# Patient Record
Sex: FEMALE | Race: ASIAN
[De-identification: names, ages, dates, MRNs, and addresses within clinical notes are randomized per-mention and may not be internally consistent; named-entity substitution may affect disease eponyms.]

---

## 2018-01-04 ENCOUNTER — HOSPITAL ENCOUNTER (OUTPATIENT)
Dept: HOSPITAL 53 - M LAB | Age: 48
End: 2018-01-04
Attending: NURSE PRACTITIONER
Payer: COMMERCIAL

## 2018-01-04 DIAGNOSIS — E78.4: ICD-10-CM

## 2018-01-04 DIAGNOSIS — I10: Primary | ICD-10-CM

## 2018-01-04 DIAGNOSIS — F33.1: Primary | ICD-10-CM

## 2018-01-04 LAB
25(OH)D3 SERPL-MCNC: 15.3 NG/ML (ref 30–100)
ADD MANUAL DIFFER: YES
ALBUMIN/GLOBULIN RATIO: 0.89 (ref 1–1.93)
ALBUMIN/GLOBULIN RATIO: 0.89 (ref 1–1.93)
ALBUMIN: 3.3 GM/DL (ref 3.2–5.2)
ALBUMIN: 3.3 GM/DL (ref 3.2–5.2)
ALKALINE PHOSPHATASE: 81 U/L (ref 45–117)
ALKALINE PHOSPHATASE: 84 U/L (ref 45–117)
ALT SERPL W P-5'-P-CCNC: 21 U/L (ref 12–78)
ALT SERPL W P-5'-P-CCNC: 22 U/L (ref 12–78)
ANION GAP: 9 MEQ/L (ref 8–16)
ANION GAP: 9 MEQ/L (ref 8–16)
AST SERPL-CCNC: 11 U/L (ref 7–37)
AST SERPL-CCNC: 11 U/L (ref 7–37)
ATYPICAL LYMPH: 8 % (ref 0–5)
BANDS: 1 % (ref ?–11)
BILIRUBIN,TOTAL: 0.6 MG/DL (ref 0.2–1)
BILIRUBIN,TOTAL: 0.6 MG/DL (ref 0.2–1)
BLOOD UREA NITROGEN: 10 MG/DL (ref 7–18)
BLOOD UREA NITROGEN: 10 MG/DL (ref 7–18)
CALCIUM LEVEL: 8.9 MG/DL (ref 8.5–10.1)
CALCIUM LEVEL: 8.9 MG/DL (ref 8.5–10.1)
CARBON DIOXIDE LEVEL: 26 MEQ/L (ref 21–32)
CARBON DIOXIDE LEVEL: 26 MEQ/L (ref 21–32)
CHLORIDE LEVEL: 102 MEQ/L (ref 98–107)
CHLORIDE LEVEL: 103 MEQ/L (ref 98–107)
CHOLEST SERPL-MCNC: 117 MG/DL (ref ?–200)
CHOLEST SERPL-MCNC: 121 MG/DL (ref ?–200)
CHOLESTEROL RISK RATIO: 3.08 (ref ?–5)
CHOLESTEROL RISK RATIO: 3.18 (ref ?–5)
CREATININE FOR GFR: 0.67 MG/DL (ref 0.55–1.02)
CREATININE FOR GFR: 0.8 MG/DL (ref 0.55–1.02)
DIFF SLIDE NUMBER: 147
EOSINOPHILS: 12 % (ref 0–5)
EST. AVERAGE GLUCOSE BLD GHB EST-MCNC: 289 MG/DL (ref 60–110)
GFR SERPL CREATININE-BSD FRML MDRD: > 60 ML/MIN/{1.73_M2} (ref 58–?)
GFR SERPL CREATININE-BSD FRML MDRD: > 60 ML/MIN/{1.73_M2} (ref 58–?)
GLUCOSE, FASTING: 286 MG/DL (ref 70–105)
GLUCOSE, FASTING: 288 MG/DL (ref 70–105)
HDLC SERPL-MCNC: 38 MG/DL (ref 40–?)
HDLC SERPL-MCNC: 38 MG/DL (ref 40–?)
HEMATOCRIT: 41.7 % (ref 36–47)
HEMOGLOBIN: 14.3 G/DL (ref 12–16)
LDL CHOLESTEROL: 40 MG/DL (ref ?–100)
LDL CHOLESTEROL: 44 MG/DL (ref ?–100)
LYMPHOCYTES: 29 % (ref 16–52)
MEAN CORPUSCULAR HEMOGLOBIN: 28.3 PG (ref 27–33)
MEAN CORPUSCULAR HGB CONC: 34.3 G/DL (ref 32–36.5)
MEAN CORPUSCULAR VOLUME: 82.6 FL (ref 80–96)
MONOCYTES: 4 % (ref 0–8)
NEUTROPHILS: 46 % (ref 35–75)
NONHDLC SERPL-MCNC: 79 MG/DL
NONHDLC SERPL-MCNC: 83 MG/DL
NRBC BLD AUTO-RTO: 0 % (ref 0–0)
PLATELET BLD QL SMEAR: NORMAL
PLATELET COUNT, AUTOMATED: 307 10^3/UL (ref 150–450)
POSITIVE DIFF: (no result)
POTASSIUM SERUM: 3.9 MEQ/L (ref 3.5–5.1)
POTASSIUM SERUM: 4 MEQ/L (ref 3.5–5.1)
RBC MORPHOLOGY: NORMAL
RED BLOOD COUNT: 5.05 10^6/UL (ref 4–5.4)
RED CELL DISTRIBUTION WIDTH: 12.4 % (ref 11.5–14.5)
SODIUM LEVEL: 137 MEQ/L (ref 136–145)
SODIUM LEVEL: 138 MEQ/L (ref 136–145)
THYROID STIMULATING HORMONE: 4.26 UIU/ML (ref 0.36–3.74)
TOTAL PROTEIN: 7 GM/DL (ref 6.4–8.2)
TOTAL PROTEIN: 7 GM/DL (ref 6.4–8.2)
TRIGLYCERIDES LEVEL: 195 MG/DL (ref ?–150)
TRIGLYCERIDES LEVEL: 195 MG/DL (ref ?–150)
VITAMIN B12 LEVEL: 383 PG/ML (ref 247–911)
WHITE BLOOD COUNT: 14.6 10^3/UL (ref 4–10)

## 2018-01-04 PROCEDURE — 84443 ASSAY THYROID STIM HORMONE: CPT

## 2018-01-04 PROCEDURE — 80053 COMPREHEN METABOLIC PANEL: CPT

## 2019-01-02 ENCOUNTER — HOSPITAL ENCOUNTER (OUTPATIENT)
Dept: HOSPITAL 53 - M LAB | Age: 49
End: 2019-01-02
Attending: NURSE PRACTITIONER
Payer: COMMERCIAL

## 2019-01-02 DIAGNOSIS — E10.9: ICD-10-CM

## 2019-01-02 DIAGNOSIS — G56.02: ICD-10-CM

## 2019-01-02 DIAGNOSIS — I10: ICD-10-CM

## 2019-01-02 DIAGNOSIS — Z01.818: Primary | ICD-10-CM

## 2019-01-02 DIAGNOSIS — E78.49: ICD-10-CM

## 2019-01-02 LAB
ALBUMIN SERPL BCG-MCNC: 2.9 GM/DL (ref 3.2–5.2)
ALT SERPL W P-5'-P-CCNC: 25 U/L (ref 12–78)
BASOPHILS # BLD AUTO: 0 10^3/UL (ref 0–0.2)
BASOPHILS NFR BLD AUTO: 0.4 % (ref 0–1)
BILIRUB SERPL-MCNC: 0.4 MG/DL (ref 0.2–1)
BUN SERPL-MCNC: 13 MG/DL (ref 7–18)
BUN SERPL-MCNC: 13 MG/DL (ref 7–18)
CALCIUM SERPL-MCNC: 8.5 MG/DL (ref 8.5–10.1)
CALCIUM SERPL-MCNC: 8.5 MG/DL (ref 8.5–10.1)
CHLORIDE SERPL-SCNC: 106 MEQ/L (ref 98–107)
CHLORIDE SERPL-SCNC: 107 MEQ/L (ref 98–107)
CHOLEST SERPL-MCNC: 135 MG/DL (ref ?–200)
CHOLEST/HDLC SERPL: 2.93 {RATIO} (ref ?–5)
CO2 SERPL-SCNC: 30 MEQ/L (ref 21–32)
CO2 SERPL-SCNC: 31 MEQ/L (ref 21–32)
CREAT SERPL-MCNC: 0.64 MG/DL (ref 0.55–1.3)
CREAT SERPL-MCNC: 0.69 MG/DL (ref 0.55–1.3)
EOSINOPHIL # BLD AUTO: 0.6 10^3/UL (ref 0–0.5)
EOSINOPHIL NFR BLD AUTO: 5.2 % (ref 0–3)
GFR SERPL CREATININE-BSD FRML MDRD: > 60 ML/MIN/{1.73_M2} (ref 58–?)
GFR SERPL CREATININE-BSD FRML MDRD: > 60 ML/MIN/{1.73_M2} (ref 58–?)
GLUCOSE SERPL-MCNC: 48 MG/DL (ref 70–100)
GLUCOSE SERPL-MCNC: 51 MG/DL (ref 70–100)
HCT VFR BLD AUTO: 37.9 % (ref 36–47)
HDLC SERPL-MCNC: 46 MG/DL (ref 40–?)
HGB BLD-MCNC: 12.1 G/DL (ref 12–15.5)
LDLC SERPL CALC-MCNC: 64 MG/DL (ref ?–100)
LYMPHOCYTES # BLD AUTO: 3.3 10^3/UL (ref 1.5–4.5)
LYMPHOCYTES NFR BLD AUTO: 30 % (ref 24–44)
MCH RBC QN AUTO: 27.6 PG (ref 27–33)
MCHC RBC AUTO-ENTMCNC: 31.9 G/DL (ref 32–36.5)
MCV RBC AUTO: 86.5 FL (ref 80–96)
MONOCYTES # BLD AUTO: 0.9 10^3/UL (ref 0–0.8)
MONOCYTES NFR BLD AUTO: 8.1 % (ref 0–5)
NEUTROPHILS # BLD AUTO: 6.2 10^3/UL (ref 1.8–7.7)
NEUTROPHILS NFR BLD AUTO: 55.8 % (ref 36–66)
NONHDLC SERPL-MCNC: 89 MG/DL
PLATELET # BLD AUTO: 333 10^3/UL (ref 150–450)
POTASSIUM SERPL-SCNC: 3.9 MEQ/L (ref 3.5–5.1)
POTASSIUM SERPL-SCNC: 4.1 MEQ/L (ref 3.5–5.1)
PROT SERPL-MCNC: 6.6 GM/DL (ref 6.4–8.2)
RBC # BLD AUTO: 4.38 10^6/UL (ref 4–5.4)
SODIUM SERPL-SCNC: 142 MEQ/L (ref 136–145)
SODIUM SERPL-SCNC: 142 MEQ/L (ref 136–145)
TRIGL SERPL-MCNC: 123 MG/DL (ref ?–150)
WBC # BLD AUTO: 11.1 10^3/UL (ref 4–10)

## 2019-06-14 ENCOUNTER — HOSPITAL ENCOUNTER (OUTPATIENT)
Dept: HOSPITAL 53 - M OPP | Age: 49
Discharge: HOME | End: 2019-06-14
Attending: INTERNAL MEDICINE
Payer: COMMERCIAL

## 2019-06-14 VITALS — SYSTOLIC BLOOD PRESSURE: 141 MMHG | DIASTOLIC BLOOD PRESSURE: 78 MMHG

## 2019-06-14 VITALS — BODY MASS INDEX: 43.02 KG/M2 | HEIGHT: 63 IN | WEIGHT: 242.8 LBS

## 2019-06-14 DIAGNOSIS — K57.30: ICD-10-CM

## 2019-06-14 DIAGNOSIS — K60.2: ICD-10-CM

## 2019-06-14 DIAGNOSIS — Q43.8: ICD-10-CM

## 2019-06-14 DIAGNOSIS — D12.5: Primary | ICD-10-CM

## 2019-06-14 DIAGNOSIS — Z80.0: ICD-10-CM

## 2019-06-14 DIAGNOSIS — K92.1: ICD-10-CM

## 2019-06-14 DIAGNOSIS — K64.8: ICD-10-CM

## 2019-06-14 NOTE — ROOR
________________________________________________________________________________

Patient Name: Kath Ken            Procedure Date: 6/14/2019 7:30 AM

MRN: [id number]                          Account Number: [id number]

YOB: 1970               Age: 49

Room: [facility]                            Gender: Female

Note Status: Finalized                 

________________________________________________________________________________

 

Procedure:           Colonoscopy

Indications:         Hematochezia, Family history of colon cancer in multiple 

                     second-degree relatives, anal fissure

Providers:           Reid RENDON MD

Referring MD:        Twan Bailon NP

Requesting Provider: 

Medicines:           Monitored Anesthesia Care

Complications:       No immediate complications.

________________________________________________________________________________

Procedure:           Pre-Anesthesia Assessment:

                     - The heart rate, respiratory rate, oxygen saturations, 

                     blood pressure, adequacy of pulmonary ventilation, and 

                     response to care were monitored throughout the procedure.

                     The Colonoscope was introduced through the anus and 

                     advanced to the cecum, identified by appendiceal orifice 

                     and ileocecal valve. The colonoscopy was performed 

                     without difficulty. The patient tolerated the procedure 

                     well. The quality of the bowel preparation was good.

                                                                                

Findings:

     An anal fissure was found on perianal exam.

     A 5 mm polyp was found in the sigmoid colon. The polyp was sessile. The 

     polyp was removed with a cold snare. Resection and retrieval were 

     complete. To prevent bleeding after the polypectomy, two hemostatic clips 

     were successfully placed. There was no bleeding at the end of the 

     procedure.

     Multiple small-mouthed diverticula were found in the sigmoid colon.

     The sigmoid colon was tortuous.

     The exam was otherwise without abnormality on direct and retroflexion 

     views.

     Internal hemorrhoids were found during retroflexion. The hemorrhoids were 

     moderate.

                                                                                

Impression:          - A small anal fissure found on perianal exam.

                     - One 5 mm polyp in the sigmoid colon, removed with a 

                     cold snare. Resected and retrieved. Clips were placed.

                     - Mild diverticulosis in the sigmoid colon.

                     - Internal hemorrhoids.

                     - Tortuous sigmoid colon.

                     - The examination was otherwise normal on direct and 

                     retroflexion views.

Recommendation:      - Repeat colonoscopy in 5 years for surveillance.

                                                                                

 

Reid Rendon MD

________________

Reid RENDON MD

6/14/2019 8:03:37 AM

Electronically signed by Reid RENDON MD

Number of Addenda: 0

 

Note Initiated On: 6/14/2019 7:30 AM

Estimated Blood Loss:

     Estimated blood loss: none.

## 2019-08-13 ENCOUNTER — HOSPITAL ENCOUNTER (OUTPATIENT)
Dept: HOSPITAL 53 - M LAB | Age: 49
End: 2019-08-13
Attending: NURSE PRACTITIONER
Payer: COMMERCIAL

## 2019-08-13 DIAGNOSIS — E11.9: ICD-10-CM

## 2019-08-13 DIAGNOSIS — I10: Primary | ICD-10-CM

## 2019-08-13 DIAGNOSIS — E78.49: ICD-10-CM

## 2019-08-13 LAB
ALBUMIN SERPL BCG-MCNC: 2.9 GM/DL (ref 3.2–5.2)
ALT SERPL W P-5'-P-CCNC: 25 U/L (ref 12–78)
BASOPHILS # BLD AUTO: 0.1 10^3/UL (ref 0–0.2)
BASOPHILS NFR BLD AUTO: 0.5 % (ref 0–1)
BILIRUB SERPL-MCNC: 0.3 MG/DL (ref 0.2–1)
BUN SERPL-MCNC: 19 MG/DL (ref 7–18)
CALCIUM SERPL-MCNC: 8.8 MG/DL (ref 8.5–10.1)
CHLORIDE SERPL-SCNC: 108 MEQ/L (ref 98–107)
CHOLEST SERPL-MCNC: 134 MG/DL (ref ?–200)
CHOLEST/HDLC SERPL: 3.27 {RATIO} (ref ?–5)
CO2 SERPL-SCNC: 28 MEQ/L (ref 21–32)
CREAT SERPL-MCNC: 0.79 MG/DL (ref 0.55–1.3)
EOSINOPHIL # BLD AUTO: 0.8 10^3/UL (ref 0–0.5)
EOSINOPHIL NFR BLD AUTO: 6.6 % (ref 0–3)
EST. AVERAGE GLUCOSE BLD GHB EST-MCNC: 157 MG/DL (ref 60–110)
GFR SERPL CREATININE-BSD FRML MDRD: > 60 ML/MIN/{1.73_M2} (ref 58–?)
GLUCOSE SERPL-MCNC: 115 MG/DL (ref 70–100)
HCT VFR BLD AUTO: 40.7 % (ref 36–47)
HDLC SERPL-MCNC: 41 MG/DL (ref 40–?)
HGB BLD-MCNC: 12.9 G/DL (ref 12–15.5)
LDLC SERPL CALC-MCNC: 67 MG/DL (ref ?–100)
LYMPHOCYTES # BLD AUTO: 3.9 10^3/UL (ref 1.5–4.5)
LYMPHOCYTES NFR BLD AUTO: 32.2 % (ref 24–44)
MCH RBC QN AUTO: 27.6 PG (ref 27–33)
MCHC RBC AUTO-ENTMCNC: 31.7 G/DL (ref 32–36.5)
MCV RBC AUTO: 87 FL (ref 80–96)
MONOCYTES # BLD AUTO: 0.9 10^3/UL (ref 0–0.8)
MONOCYTES NFR BLD AUTO: 7.5 % (ref 0–5)
NEUTROPHILS # BLD AUTO: 6.4 10^3/UL (ref 1.8–7.7)
NEUTROPHILS NFR BLD AUTO: 52.7 % (ref 36–66)
NONHDLC SERPL-MCNC: 93 MG/DL
PLATELET # BLD AUTO: 308 10^3/UL (ref 150–450)
POTASSIUM SERPL-SCNC: 4 MEQ/L (ref 3.5–5.1)
PROT SERPL-MCNC: 6.5 GM/DL (ref 6.4–8.2)
RBC # BLD AUTO: 4.68 10^6/UL (ref 4–5.4)
SODIUM SERPL-SCNC: 142 MEQ/L (ref 136–145)
TRIGL SERPL-MCNC: 132 MG/DL (ref ?–150)
WBC # BLD AUTO: 12.1 10^3/UL (ref 4–10)

## 2021-04-13 ENCOUNTER — HOSPITAL ENCOUNTER (OUTPATIENT)
Dept: HOSPITAL 53 - M WHC | Age: 51
End: 2021-04-13
Attending: FAMILY MEDICINE
Payer: MEDICARE

## 2021-04-13 DIAGNOSIS — Z80.0: ICD-10-CM

## 2021-04-13 DIAGNOSIS — Z12.31: Primary | ICD-10-CM

## 2021-04-13 NOTE — REPMRS
Patient History

The patient states she has not had a clinical breast exam in over

a year.

Family history of colorectal cancer at age 50 in mother, 

colorectal cancer in maternal grandmother.

 

3D TOMOSYNTHESIS WAS PERFORMED.

 

The [facility] lifetime risk for breast cancer is 8.3%.

 

Volpara breast density b.

 

Digital Woman Screen Mammo: April 13, 2021 - Exam #: 

[id number]

Bilateral CC and MLO view(s) were taken.

 

Technologist: Alicia Kaur, Technologist

Prior study comparison: May 2, 2012, bilateral digital mammo 

screening bilat, performed at [facility].

 

FINDINGS: There are scattered fibroglandular densities.  There 

has been no change in the appearance of the mammogram from the 

prior studies.  There is a mild amount of residual fibroglandular

tissue which is fairly symmetric. There is no interval 

development of dominant mass, architectural distortion, or 

clustered microcalcification suggestive of malignancy.

 

Assessment: BI-RADS/ACR category 1 mammogram. Negative Mammogram.

 

Recommendation

Routine screening mammogram in 1 year (for women over age 40).

This mammogram was interpreted with the aid of an FDA-approved 

computer-aided dectection system.

 

Electronically Signed By: Wilmer Almanzar MD 04/13/21 [id number]

## 2021-10-16 ENCOUNTER — HOSPITAL ENCOUNTER (EMERGENCY)
Dept: HOSPITAL 53 - M ED | Age: 51
Discharge: HOME | End: 2021-10-16
Payer: MEDICARE

## 2021-10-16 VITALS — DIASTOLIC BLOOD PRESSURE: 100 MMHG | SYSTOLIC BLOOD PRESSURE: 174 MMHG

## 2021-10-16 VITALS — WEIGHT: 240.5 LBS | BODY MASS INDEX: 42.61 KG/M2 | HEIGHT: 63 IN

## 2021-10-16 DIAGNOSIS — Z79.4: ICD-10-CM

## 2021-10-16 DIAGNOSIS — F32.9: ICD-10-CM

## 2021-10-16 DIAGNOSIS — Z91.040: ICD-10-CM

## 2021-10-16 DIAGNOSIS — E11.9: ICD-10-CM

## 2021-10-16 DIAGNOSIS — I10: ICD-10-CM
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
## 2021-10-16 DIAGNOSIS — Z88.8: ICD-10-CM

## 2021-10-16 DIAGNOSIS — Z79.899: ICD-10-CM
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
## 2021-10-16 DIAGNOSIS — S82.852A: Primary | ICD-10-CM
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
## 2021-10-16 LAB
BASOPHILS # BLD AUTO: 0.1 10^3/UL (ref 0–0.2)
BASOPHILS NFR BLD AUTO: 0.6 % (ref 0–1)
BUN SERPL-MCNC: 28 MG/DL (ref 7–18)
CALCIUM SERPL-MCNC: 8.9 MG/DL (ref 8.5–10.1)
CHLORIDE SERPL-SCNC: 102 MEQ/L (ref 98–107)
CO2 SERPL-SCNC: 30 MEQ/L (ref 21–32)
CREAT SERPL-MCNC: 1.49 MG/DL (ref 0.55–1.3)
EOSINOPHIL # BLD AUTO: 0.6 10^3/UL (ref 0–0.5)
EOSINOPHIL NFR BLD AUTO: 4.3 % (ref 0–3)
GFR SERPL CREATININE-BSD FRML MDRD: 39.4 ML/MIN/{1.73_M2} (ref 51–?)
GLUCOSE SERPL-MCNC: 279 MG/DL (ref 70–100)
HCT VFR BLD AUTO: 44 % (ref 36–47)
HGB BLD-MCNC: 14.2 G/DL (ref 12–15.5)
LYMPHOCYTES # BLD AUTO: 4.3 10^3/UL (ref 1.5–5)
LYMPHOCYTES NFR BLD AUTO: 32 % (ref 24–44)
MCH RBC QN AUTO: 25.9 PG (ref 27–33)
MCHC RBC AUTO-ENTMCNC: 32.3 G/DL (ref 32–36.5)
MCV RBC AUTO: 80.1 FL (ref 80–96)
MONOCYTES # BLD AUTO: 1.2 10^3/UL (ref 0–0.8)
MONOCYTES NFR BLD AUTO: 8.7 % (ref 2–8)
NEUTROPHILS # BLD AUTO: 7.2 10^3/UL (ref 1.5–8.5)
NEUTROPHILS NFR BLD AUTO: 53.7 % (ref 36–66)
PLATELET # BLD AUTO: 344 10^3/UL (ref 150–450)
POTASSIUM SERPL-SCNC: 4.5 MEQ/L (ref 3.5–5.1)
RBC # BLD AUTO: 5.49 10^6/UL (ref 4–5.4)
SODIUM SERPL-SCNC: 139 MEQ/L (ref 136–145)
WBC # BLD AUTO: 13.4 10^3/UL (ref 4–10)

## 2021-10-16 PROCEDURE — 86850 RBC ANTIBODY SCREEN: CPT

## 2021-10-16 PROCEDURE — 80048 BASIC METABOLIC PNL TOTAL CA: CPT

## 2021-10-16 PROCEDURE — 99285 EMERGENCY DEPT VISIT HI MDM: CPT

## 2021-10-16 PROCEDURE — 73564 X-RAY EXAM KNEE 4 OR MORE: CPT

## 2021-10-16 PROCEDURE — 99156 MOD SED OTH PHYS/QHP 5/>YRS: CPT

## 2021-10-16 PROCEDURE — 73610 X-RAY EXAM OF ANKLE: CPT

## 2021-10-16 PROCEDURE — 27818 TREATMENT OF ANKLE FRACTURE: CPT

## 2021-10-16 PROCEDURE — 86900 BLOOD TYPING SEROLOGIC ABO: CPT

## 2021-10-16 PROCEDURE — 96374 THER/PROPH/DIAG INJ IV PUSH: CPT

## 2021-10-16 PROCEDURE — 73590 X-RAY EXAM OF LOWER LEG: CPT

## 2021-10-16 PROCEDURE — 93041 RHYTHM ECG TRACING: CPT

## 2021-10-16 PROCEDURE — 96375 TX/PRO/DX INJ NEW DRUG ADDON: CPT

## 2021-10-16 PROCEDURE — 86901 BLOOD TYPING SEROLOGIC RH(D): CPT

## 2021-10-16 PROCEDURE — 99157 MOD SED OTHER PHYS/QHP EA: CPT

## 2021-10-16 PROCEDURE — 85025 COMPLETE CBC W/AUTO DIFF WBC: CPT

## 2021-10-16 NOTE — CCD
Summarization Of Episode

                             Created on: 10/16/2021



VELMA BECK

External Reference #: [id number]

: 1970

Sex: Undifferentiated



Demographics





                          Address                   [address] [place] [address]  [address]

 

                          Home Phone                [phone]

 

                          Preferred Language        English

 

                          Marital Status            Unknown

 

                          Worship Affiliation     Unknown

 

                          Race                      Unknown

 

                          Ethnic Group              Not  or 





Author





                          Author                    [facility]

 

                          Organization              [facility]

 

                          Address                   Unknown

 

                          Phone                     Unavailable







Support





                Name            Relationship    Address         Phone

 

                DISABLED        Next Of Kin     Unknown         Unavailable

 

                    GRUPO,  MASOUD        Next Of Kin         [address] [place]  [address] [phone]

 

                    Onur MAYOma MCKEE   Next Of Kin         [address] [place], [place]  [phone]

 

                CONTACTS,  NO   Next Of Kin     Unknown         Unavailable

 

                    CARFRLUCÍA            Next Of Kin         [address] [place], [place]  [id number] [phone]

 

                    LINDA TALAMANTES    Next Of Kin         [address], [place]  [id number] [phone]

 

                 [employer] Next Of Kin     Unknown         Unavailable

 

                KUN PEREIRA  Next Of Kin     Unknown         [phone]

 

                UE              Next Of Kin     Unknown         Unavailable

 

                    JAS ASKEW    Next Of Kin         [address] [place], [place]  [id number] [phone]







Care Team Providers





                    Care Team Member Name Role                Phone

 

                    Trickey, J Chelsea PA  Unavailable         Unavailable

 

                    Trickey, J Chelsea PA  Unavailable         Unavailable

 

                    Trickey, J Chelsea PA  Unavailable         Unavailable

 

                    Trickey, J Chelsea PA  Unavailable         Unavailable

 

                    Trickey, J Chelsea PA  Unavailable         Unavailable

 

                    Trickey, J Chelsea PA  Unavailable         Unavailable

 

                    Trickey, J Chelsea PA  Unavailable         Unavailable

 

                    Trickey, J Chelsea PA  Unavailable         Unavailable

 

                    Trickey, J Chelsea PA  Unavailable         Unavailable

 

                    Trickey, J Chelsea PA  Unavailable         Unavailable

 

                    Trickey, J Chelsea PA  Unavailable         Unavailable

 

                    Trickey, J Chelsea PA  Unavailable         Unavailable

 

                    Trickey, J Chelsea PA  Unavailable         Unavailable

 

                    Trickey, J Chelsea PA  Unavailable         Unavailable

 

                    Trickey, J Chelsea PA  Unavailable         Unavailable

 

                    Trickey, J Chelsea PA  Unavailable         Unavailable

 

                    Trickey, J Chelsea PA  Unavailable         Unavailable

 

                    Trickey, J Chelsea PA  Unavailable         Unavailable

 

                    Trickey, J Chelsea PA  Unavailable         Unavailable

 

                    Trickey, J Chelsea PA  Unavailable         Unavailable

 

                    Trickey, J Chelsea PA  Unavailable         Unavailable

 

                    Trickey, J Chelsea PA  Unavailable         Unavailable

 

                    Trickey, J Chelsea PA  Unavailable         Unavailable

 

                    Trickey, J Chelsea PA  Unavailable         Unavailable

 

                    Trickey, J Chelsea PA  Unavailable         Unavailable

 

                    Trickey, J Chelsea PA  Unavailable         Unavailable

 

                    Trickey, J Chelsea PA  Unavailable         Unavailable

 

                    Trickey, J Chelsea PA  Unavailable         Unavailable

 

                    Trickey, J Chelsea PA  Unavailable         Unavailable

 

                    Trickey, J Chelsea PA  Unavailable         Unavailable

 

                    Trickey, J Chelsea PA  Unavailable         Unavailable

 

                    Trickey, J Chelsea PA  Unavailable         Unavailable

 

                    Trickey, J Chelsea PA  Unavailable         Unavailable

 

                    Trickey, J Chelsea PA  Unavailable         Unavailable

 

                    Trickey, J Chelsea PA  Unavailable         Unavailable

 

                    Trickey, J Chelsea PA  Unavailable         Unavailable

 

                    Trickey, J Chelsea PA  Unavailable         Unavailable

 

                    Trickey, J Chelsea PA  Unavailable         Unavailable

 

                    Trickey, J Chelsea PA  Unavailable         Unavailable

 

                    Trickey, J Chelsea PA  Unavailable         Unavailable

 

                    Trickey, J Chelsea PA  Unavailable         Unavailable

 

                    Trickey, J Chelsea PA  Unavailable         Unavailable

 

                    Trickey, J Chelsea PA  Unavailable         Unavailable

 

                    Trickey, J Chelsea PA  Unavailable         Unavailable

 

                    Trickey, J Chelsea PA  Unavailable         Unavailable

 

                    Trickey, J Chelsea PA  Unavailable         Unavailable

 

                    Trickey, J Chelsea PA  Unavailable         Unavailable

 

                    Trickey, J Chelsea PA  Unavailable         Unavailable

 

                    Trickey, J Chelsea PA  Unavailable         Unavailable



                                  



Re-disclosure Warning

          The records that you are about to access may contain information from 
federally-assisted alcohol or drug abuse programs. If such information is 
present, then the following federally mandated warning applies: This information
has been disclosed to you from records protected by federal confidentiality 
rules (42 CFR part 2). The federal rules prohibit you from making any further 
disclosure of this information unless further disclosure is expressly permitted 
by the written consent of the person to whom it pertains or as otherwise 
permitted by 42 CFR part 2. A general authorization for the release of medical 
or other information is NOT sufficient for this purpose. The Federal rules 
restrict any use of the information to criminally investigate or prosecute any 
alcohol or drug abuse patient.The records that you are about to access may 
contain highly sensitive health information, the redisclosure of which is 
protected by Article 27-F of the [facility] Public Health law. If you 
continue you may have access to information: Regarding HIV / AIDS; Provided by 
facilities licensed or operated by the [facility] Office of Mental Health; 
or Provided by the [facility] Office for People With Developmental 
Disabilities. If such information is present, then the following [place] 
mandated warning applies: This information has been disclosed to you from 
confidential records which are protected by state law. State law prohibits you 
from making any further disclosure of this information without the specific 
written consent of the person to whom it pertains, or as otherwise permitted by 
law. Any unauthorized further disclosure in violation of state law may result in
a fine or senior living sentence or both. A general authorization for the release of 
medical or other information is NOT sufficient authorization for further disc
losure.                                                                         
    



Family History

          



             Family Member Name Family Member Gender Family Member Status Date o

f Status 

Description                             Data Source(s)

 

           Unknown    Unknown    Problem                          [facility] ([facility] )



                                                                                
       



Encounters

          



           Encounter  Providers  Location   Date       Indications Data Source(s

)

 

                Outpatient      Attender: Chelsea LAW Main office - [facility]

n 2021 11:15:00

AM EDT                                              [facility] ([facility])

 

                Telehealth Physchotherapy 30 Minutes with Patient               

  [facility] 

2021 12:00:00 AM EDT                           [facility] ([facility]

[facility])

 

                Telehealth Physchotherapy 30 Minutes with Patient               

  [facility] 

2021 12:00:00 AM EDT                           [facility] ([facility]

[facility])

 

           Outpatient            [facility] 2021 12:00:00 AM

 EDT            [facility] 

([facility])

 

                Telehealth Physchotherapy 30 Minutes with Patient               

  [facility] 

2021 12:00:00 AM EDT                           [facility] ([facility]

[facility])

 

                Telehealth Physchotherapy 30 Minutes with Patient               

  [facility] 

2021 12:00:00 AM EDT                           [facility] ([facility]

[facility])

 

                Telehealth Physchotherapy 30 Minutes with Patient               

  [facility] 

2021 12:00:00 AM EDT                           [facility] ([facility]

[facility])

 

                Telehealth Physchotherapy 30 Minutes with Patient               

  [facility] 

2021 12:00:00 AM EDT                           [facility] ([facility]

[facility])

 

                Outpatient      Attender: Chelsea LAW [facility] office - [facility]

n 2021 01:45:00

PM EDT                                              [facility] ([facility] )

 

                Telehealth Physchotherapy 30 Minutes with Patient               

  [facility] 

2021 12:00:00 AM EDT                           [facility] ([facility])

 

                Telehealth Physchotherapy 30 Minutes with Patient               

  [facility] 

2021 12:00:00 AM EDT                           [facility] ([facility])

 

           Outpatient            [facility] 2021 12:00:00 AM

 EDT            [facility] 

([facility])

 

                Outpatient      Attender: Chelsea LAW Main office - [facility]

n 2021 11:15:00

AM EDT                                              [facility]

 

                Outpatient      Attender: Chelsea LAW Main office - [facility]

n 2020 08:45:00

AM EST                                              [facility]



                                                                                
                                                                                
                                                        



Immunizations

          



             Vaccine      Date         Status       Description  Data Source(s)

 

             COVID-19 VACCINE Moderna 2021 12:00:00 AM EDT completed      

           [facility]

 

                                        Vaccine Series Complete: YESThis Data wa

s Submitted to [facility] Via [facility]. 

 

             COVID-19 VACCINE Moderna 2021 12:00:00 AM EDT completed      

           [facility]

 

                                        Vaccine Series Complete: NOThis Data was

 Submitted to [facility] [employer]. 



                                                                                
                 



Medications

          



          Medication Brand Name Start Date Product Form Dose      Route     Admi

nistrative 

Instructions Pharmacy Instructions Status     Indications Reaction   Description

 Data 

Source(s)

 

                    24 HR venlafaxine 150 MG Extended Release Oral Capsule [Effe

xor] Effexor XR          

2021 12:00:00 AM EDT        150 mg oral                 active            

   Effexor XR [facility] 

([facility])

 

                    24 HR venlafaxine 75 MG Extended Release Oral Capsule [Effex

or] Effexor XR          

2021 12:00:00 AM EDT        75 mg  oral                 active            

   Effexor XR [facility] 

([facility])

 

                    24 HR venlafaxine 75 MG Extended Release Oral Capsule [Effex

or] Effexor XR          

2021 12:00:00 AM EDT        75 mg  oral                 active            

   Effexor XR [facility] 

([facility])

 

                    24 HR venlafaxine 150 MG Extended Release Oral Capsule [Effe

xor] Effexor XR          

2021 12:00:00 AM EDT        150 mg oral                 active            

   Effexor XR [facility] 

([facility])

 

                    24 HR venlafaxine 150 MG Extended Release Oral Capsule [Effe

xor] Effexor XR          

2021 12:00:00 AM EDT        150 mg oral                 active            

   Effexor XR [facility] 

([facility])

 

                    24 HR venlafaxine 75 MG Extended Release Oral Capsule [Effex

or] Effexor XR          

2021 12:00:00 AM EDT        75 mg  oral                 active            

   Effexor XR [facility] 

([facility])

 

                    24 HR venlafaxine 75 MG Extended Release Oral Capsule [Effex

or] Effexor XR          

2021 12:00:00 AM EDT        75 mg  oral                 active            

   Effexor XR [facility] 

([facility])

 

                    24 HR venlafaxine 75 MG Extended Release Oral Capsule [Effex

or] Effexor XR          

2021 12:00:00 AM EDT        75 mg  oral                 active            

   Effexor XR [facility] 

([facility])

 

                buspirone hydrochloride 10 MG Oral Tablet buspirone       2021 12:00:00 AM EDT  

        10 mg   oral                    active                  buspirone [facility]

[facility] ([facility] 

[facility])

 

                buspirone hydrochloride 10 MG Oral Tablet buspirone       2021 12:00:00 AM EDT  

        10 mg   oral                    active                  buspirone [facility]

[facility] ([facility] 

[facility])

 

                buspirone hydrochloride 10 MG Oral Tablet buspirone       2021 12:00:00 AM EDT  

        10 mg   oral                    active                  buspirone [facility]

[facility] ([facility] 

[facility])

 

                    24 HR venlafaxine 150 MG Extended Release Oral Capsule [Effe

xor] Effexor XR          

2021 12:00:00 AM EDT        150 mg oral                 active            

   Effexor XR [facility] 

([facility])

 

                buspirone hydrochloride 10 MG Oral Tablet buspirone       2021 12:00:00 AM EDT  

        10 mg   oral                    active                  buspirone [facility]

[facility] ([facility] 

[facility])

 

                    24 HR venlafaxine 150 MG Extended Release Oral Capsule [Effe

xor] Effexor XR          

2021 12:00:00 AM EDT        150 mg oral                 active            

   Effexor XR [facility] 

([facility])

 

                    24 HR venlafaxine 75 MG Extended Release Oral Capsule [Effex

or] Effexor XR          

2021 12:00:00 AM EDT        75 mg  oral                 active            

   Effexor XR [facility] 

([facility])

 

                buspirone hydrochloride 10 MG Oral Tablet buspirone       2021 12:00:00 AM EDT  

        10 mg   oral                    active                  buspirone [facility]

[facility] ([facility] 

[facility])

 

                    24 HR venlafaxine 75 MG Extended Release Oral Capsule [Effex

or] Effexor XR          

2021 12:00:00 AM EDT        75 mg  oral                 active            

   Effexor XR [facility] 

([facility])

 

                    24 HR venlafaxine 150 MG Extended Release Oral Capsule [Effe

xor] Effexor XR          

2021 12:00:00 AM EDT        150 mg oral                 active            

   Effexor XR [facility] 

([facility])

 

                buspirone hydrochloride 10 MG Oral Tablet buspirone       2021 12:00:00 AM EDT  

        10 mg   oral                    active                  buspirone [facility]

[facility] ([facility] 

[facility])

 

                buspirone hydrochloride 10 MG Oral Tablet buspirone       2021 12:00:00 AM EDT  

        10 mg   oral                    active                  buspirone [facility]

[facility] ([facility] 

[facility])

 

                    24 HR venlafaxine 150 MG Extended Release Oral Capsule [Effe

xor] Effexor XR          

2021 12:00:00 AM EDT        150 mg oral                 active            

   Effexor XR [facility] 

([facility])

 

                buspirone hydrochloride 10 MG Oral Tablet buspirone       2021 12:00:00 AM EDT  

        10 mg   oral                    active                  buspirone [facility]

[facility] ([facility] 

[facility])

 

                    24 HR venlafaxine 75 MG Extended Release Oral Capsule [Effex

or] Effexor XR          

2021 12:00:00 AM EDT        75 mg  oral                 active            

   Effexor XR [facility] 

([facility])

 

                    24 HR venlafaxine 150 MG Extended Release Oral Capsule [Effe

xor] Effexor XR          

2021 12:00:00 AM EDT        150 mg oral                 active            

   Effexor XR [facility] 

([facility])

 

                buspirone hydrochloride 10 MG Oral Tablet buspirone       2021 12:00:00 AM EDT  

        10 mg   oral                    active                  buspirone [facility]

[facility] ([facility])

 

                    24 HR venlafaxine 150 MG Extended Release Oral Capsule [Effe

xor] Effexor XR          

2021 12:00:00 AM EDT        150 mg oral                 active            

   Effexor XR [facility] 

([facility])

 

                    24 HR venlafaxine 75 MG Extended Release Oral Capsule [Effex

or] Effexor XR          

2021 12:00:00 AM EDT        75 mg  oral                 active            

   Effexor XR [facility] 

([facility])

 

                buspirone hydrochloride 10 MG Oral Tablet buspirone       2021 12:00:00 AM EDT  

        10 mg   oral                    active                  buspirone [facility]

[facility] ([facility] 

[facility])

 

                    24 HR venlafaxine 150 MG Extended Release Oral Capsule [Effe

xor] Effexor XR          

2021 12:00:00 AM EDT        150 mg oral                 active            

   Effexor XR [facility] 

([facility])

 

                    24 HR venlafaxine 75 MG Extended Release Oral Capsule [Effex

or] Effexor XR          

2021 12:00:00 AM EDT        75 mg  oral                 active            

   Effexor XR [facility] 

([facility])

 

                    24 HR venlafaxine 75 MG Extended Release Oral Capsule [Effex

or] Effexor XR          

2021 12:00:00 AM EDT        75 mg  oral                 completed         

      Effexor XR [facility] 

([facility])

 

                    24 HR venlafaxine 75 MG Extended Release Oral Capsule [Effex

or] Effexor XR          

2021 12:00:00 AM EDT        75 mg  oral                 completed         

      Effexor XR [facility] 

([facility])

 

                    24 HR venlafaxine 75 MG Extended Release Oral Capsule [Effex

or] Effexor XR          

2021 12:00:00 AM EDT        75 mg  oral                 completed         

      Effexor XR [facility] 

([facility])

 

                    24 HR venlafaxine 75 MG Extended Release Oral Capsule [Effex

or] Effexor XR          

2021 12:00:00 AM EDT        75 mg  oral                 completed         

      Effexor XR [facility] 

([facility])

 

                    24 HR venlafaxine 75 MG Extended Release Oral Capsule [Effex

or] Effexor XR          

2021 12:00:00 AM EDT        75 mg  oral                 completed         

      Effexor XR [facility] 

([facility])

 

                    24 HR venlafaxine 75 MG Extended Release Oral Capsule [Effex

or] Effexor XR          

2021 12:00:00 AM EDT        75 mg  oral                 completed         

      Effexor XR [facility] 

([facility])

 

                    24 HR venlafaxine 75 MG Extended Release Oral Capsule [Effex

or] Effexor XR          

2021 12:00:00 AM EDT        75 mg  oral                 completed         

      Effexor XR [facility] 

([facility])

 

                    24 HR venlafaxine 75 MG Extended Release Oral Capsule [Effex

or] Effexor XR          

2021 12:00:00 AM EDT        75 mg  oral                 completed         

      Effexor XR [facility] 

([facility])

 

                    24 HR venlafaxine 75 MG Extended Release Oral Capsule [Effex

or] Effexor XR          

2021 12:00:00 AM EDT        75 mg  oral                 completed         

      Effexor XR [facility] 

([facility])

 

                    24 HR venlafaxine 75 MG Extended Release Oral Capsule [Effex

or] Effexor XR          

2021 12:00:00 AM EDT        75 mg  oral                 completed         

      Effexor XR [facility] 

([facility])

 

             Mirtazapine 15 MG Oral Tablet [Remeron] Remeron      2021 12:

00:00 AM EDT              15 

mg      oral                    active                  Remeron [facility] ([facility])

 

             Mirtazapine 15 MG Oral Tablet [Remeron] Remeron      2021 12:

00:00 AM EDT              15 

mg      oral                    active                  Remeron [facility] ([facility])

 

             Mirtazapine 15 MG Oral Tablet [Remeron] Remeron      2021 12:

00:00 AM EDT              15 

mg      oral                    active                  Remeron [facility] ([facility])

 

             Mirtazapine 15 MG Oral Tablet [Remeron] Remeron      2021 12:

00:00 AM EDT              15 

mg      oral                    active                  [facility] [employer] ([facility] 

[facility])

 

             Mirtazapine 15 MG Oral Tablet [Remeron] Remeron      2021 12:

00:00 AM EDT              15 

mg      oral                    active                  [employer] ([facility] 

[facility])

 

             Mirtazapine 15 MG Oral Tablet [Remeron] Remeron      2021 12:

00:00 AM EDT              15 

mg      oral                    active                  [employer] ([facility] 

[facility])

 

             Mirtazapine 15 MG Oral Tablet [Remeron] Remeron      2021 12:

00:00 AM EDT              15 

mg      oral                    active                  [employer] ([facility] 

[facility])

 

             Mirtazapine 15 MG Oral Tablet [Remeron] Remeron      2021 12:

00:00 AM EDT              15 

mg      oral                    active                  [employer] ([facility] 

[facility])

 

             Mirtazapine 15 MG Oral Tablet [Remeron] Remeron      2021 12:

00:00 AM EDT              15 

mg      oral                    active                  [employer] ([facility] 

[facility])

 

             Mirtazapine 15 MG Oral Tablet [Remeron] Remeron      2021 12:

00:00 AM EDT              15 

mg      oral                    active                  [employer] [employer] ([facility] 

[facility])

 

                    24 HR venlafaxine 150 MG Extended Release Oral Capsule [Effe

xor] Effexor XR          

2021 12:00:00 AM EST        150 mg oral                 completed         

      [facility] X[facility] [employer] 

([facility])

 

                    24 HR venlafaxine 150 MG Extended Release Oral Capsule [Effe

xor] Effexor XR          

2021 12:00:00 AM EST        150 mg oral                 completed         

      [facility]xor XR [employer] 

([facility])

 

                    24 HR venlafaxine 150 MG Extended Release Oral Capsule [Effe

xor] Effexor XR          

2021 12:00:00 AM EST        150 mg oral                 completed         

      [facility]ffexor XR [employer] 

([facility])

 

                    24 HR venlafaxine 150 MG Extended Release Oral Capsule [Effe

xor] Effexor XR          

2021 12:00:00 AM EST        150 mg oral                 completed         

      [facility] XR [employer] 

([facility])

 

                    24 HR venlafaxine 150 MG Extended Release Oral Capsule [Effe

xor] Effexor XR          

2021 12:00:00 AM EST        150 mg oral                 completed         

      [facility]xor XR [employer] 

([facility])

 

                    24 HR venlafaxine 150 MG Extended Release Oral Capsule [Effe

xor] Effexor XR          

2021 12:00:00 AM EST        150 mg oral                 completed         

      [facility] XR [employer] 

([facility])

 

                    24 HR venlafaxine 150 MG Extended Release Oral Capsule [Effe

xor] Effexor XR          

2021 12:00:00 AM EST        150 mg oral                 completed         

      [facility] X[facility] [employer] 

([facility])

 

                    24 HR venlafaxine 150 MG Extended Release Oral Capsule [Effe

xor] Effexor XR          

2021 12:00:00 AM EST        150 mg oral                 completed         

      [facility]xor XR [employer] 

([facility])

 

                    24 HR venlafaxine 150 MG Extended Release Oral Capsule [Effe

xor] Effexor XR          

2021 12:00:00 AM EST        150 mg oral                 completed         

      [facility]ffexor XR [employer] 

([facility])

 

                    24 HR venlafaxine 150 MG Extended Release Oral Capsule [Effe

xor] Effexor XR          

2021 12:00:00 AM EST        150 mg oral                 completed         

      [facility]ffexor XR [employer] 

([facility])

 

                    24 HR venlafaxine 75 MG Extended Release Oral Capsule [Effex

or] Effexor XR          

02/15/2021 12:00:00 AM EST        75 mg  oral                 completed         

      [facility] X[facility] [employer] 

([facility])

 

                    24 HR venlafaxine 75 MG Extended Release Oral Capsule [Effex

or] Effexor XR          

02/15/2021 12:00:00 AM EST        75 mg  oral                 completed         

      [facility] X[facility] [employer] 

([facility])

 

                    24 HR venlafaxine 75 MG Extended Release Oral Capsule [Effex

or] Effexor XR          

02/15/2021 12:00:00 AM EST        75 mg  oral                 completed         

      [facility] X[facility] [employer] 

([facility])

 

                    24 HR venlafaxine 75 MG Extended Release Oral Capsule [Effex

or] Effexor XR          

02/15/2021 12:00:00 AM EST        75 mg  oral                 completed         

      [facility]xor XR [employer] 

([facility])

 

                    24 HR venlafaxine 75 MG Extended Release Oral Capsule [Effex

or] Effexor XR          

02/15/2021 12:00:00 AM EST        75 mg  oral                 completed         

      [facility] XR [employer] 

([facility])

 

                    24 HR venlafaxine 75 MG Extended Release Oral Capsule [Effex

or] Effexor XR          

02/15/2021 12:00:00 AM EST        75 mg  oral                 completed         

      [facility] XR [employer] 

([facility])

 

                    24 HR venlafaxine 75 MG Extended Release Oral Capsule [Effex

or] Effexor XR          

02/15/2021 12:00:00 AM EST        75 mg  oral                 completed         

      [facility] X[facility] [employer] 

([facility])

 

                    24 HR venlafaxine 75 MG Extended Release Oral Capsule [Effex

or] Effexor XR          

02/15/2021 12:00:00 AM EST        75 mg  oral                 completed         

      [facility]xor XR [employer] 

([facility])

 

                    24 HR venlafaxine 75 MG Extended Release Oral Capsule [Effex

or] Effexor XR          

02/15/2021 12:00:00 AM EST        75 mg  oral                 completed         

      [facility]ffexor XR [employer] 

([facility])

 

                    24 HR venlafaxine 75 MG Extended Release Oral Capsule [Effex

or] Effexor XR          

02/15/2021 12:00:00 AM EST        75 mg  oral                 completed         

      Effexor XR [employer] 

([facility])

 

             Mirtazapine 15 MG Oral Tablet [Remeron] Remeron      2021 12:

00:00 AM EST              15 

mg      oral                    completed                 [facility] [employer] ([facility] 

[facility])

 

             Mirtazapine 15 MG Oral Tablet [Remeron] Remeron      2021 12:

00:00 AM EST              15 

mg      oral                    completed                 [facility] [employer] ([facility] 

[facility])

 

             Mirtazapine 15 MG Oral Tablet [Remeron] Remeron      2021 12:

00:00 AM EST              15 

mg      oral                    completed                 [facility] [employer] ([facility] 

[facility])

 

             Mirtazapine 15 MG Oral Tablet [Remeron] Remeron      2021 12:

00:00 AM EST              15 

mg      oral                    completed                 [facility] [employer] ([facility] 

[facility])

 

             Mirtazapine 15 MG Oral Tablet [Remeron] Remeron      2021 12:

00:00 AM EST              15 

mg      oral                    completed                 [facility] [employer] ([facility] 

[facility])

 

             Mirtazapine 15 MG Oral Tablet [Remeron] Remeron      2021 12:

00:00 AM EST              15 

mg      oral                    completed                 [facility] [employer] ([facility] 

[facility])

 

             Mirtazapine 15 MG Oral Tablet [Remeron] Remeron      2021 12:

00:00 AM EST              15 

mg      oral                    completed                 [facility] [employer] ([facility] 

[facility])

 

             Mirtazapine 15 MG Oral Tablet [Remeron] Remeron      2021 12:

00:00 AM EST              15 

mg      oral                    completed                 [facility] [employer] ([facility] 

[facility])

 

             Mirtazapine 15 MG Oral Tablet [Remeron] Remeron      2021 12:

00:00 AM EST              15 

mg      oral                    completed                 [facility] [employer] ([facility] 

[facility])

 

             Mirtazapine 15 MG Oral Tablet [Remeron] Remeron      2021 12:

00:00 AM EST              15 

mg      oral                    completed                 [employer] [employer] ([facility] 

[facility])

 

                    Hydroxyzine Hydrochloride 25 MG Oral Tablet hydroxyzine HCl 

    2021 12:00:00 

AM EST        25 mg  oral                 active               hydroxyzine HCl T

[facility] 

[facility])

 

                    Hydroxyzine Hydrochloride 25 MG Oral Tablet hydroxyzine HCl 

    2021 12:00:00 

AM EST        25 mg  oral                 active               hydroxyzine HCl T

[facility] ([facility] 

[facility])

 

                    Hydroxyzine Hydrochloride 25 MG Oral Tablet hydroxyzine HCl 

    2021 12:00:00 

AM EST        25 mg  oral                 active               hydroxyzine HCl T

[facility] 

[facility])

 

                    Hydroxyzine Hydrochloride 25 MG Oral Tablet hydroxyzine HCl 

    2021 12:00:00 

AM EST        25 mg  oral                 active               hydroxyzine HCl T

[facility] 

[facility])

 

                    Hydroxyzine Hydrochloride 25 MG Oral Tablet hydroxyzine HCl 

    2021 12:00:00 

AM EST        25 mg  oral                 active               hydroxyzine HCl T

[facility] ([facility])

 

                    Hydroxyzine Hydrochloride 25 MG Oral Tablet hydroxyzine HCl 

    2021 12:00:00 

AM EST        25 mg  oral                 active               hydroxyzine HCl T

[facility])

 

                    Hydroxyzine Hydrochloride 25 MG Oral Tablet hydroxyzine HCl 

    2021 12:00:00 

AM EST        25 mg  oral                 active               hydroxyzine HCl T

[facility])

 

                    Hydroxyzine Hydrochloride 25 MG Oral Tablet hydroxyzine HCl 

    2021 12:00:00 

AM EST        25 mg  oral                 active               hydroxyzine HCl T

[facility] ([facility])

 

                    Hydroxyzine Hydrochloride 25 MG Oral Tablet hydroxyzine HCl 

    2021 12:00:00 

AM EST        25 mg  oral                 active               hydroxyzine HCl T

[facility] ([facility])

 

                    Hydroxyzine Hydrochloride 25 MG Oral Tablet hydroxyzine HCl 

    2021 12:00:00 

AM EST        25 mg  oral                 active               hydroxyzine HCl T

[facility] ([facility])

 

                    24 HR venlafaxine 75 MG Extended Release Oral Capsule [Effex

or] Effexor XR          

2021 12:00:00 AM EST        75 mg  oral                 completed         

      [facility] X[facility] [employer] 

([facility])

 

                    24 HR venlafaxine 75 MG Extended Release Oral Capsule [Effex

or] Effexor XR          

2021 12:00:00 AM EST        75 mg  oral                 completed         

      [facility] XR [employer] 

([facility])

 

                    24 HR venlafaxine 75 MG Extended Release Oral Capsule [Effex

or] Effexor XR          

2021 12:00:00 AM EST        75 mg  oral                 completed         

      [facility] XR [employer] 

([facility])

 

                    24 HR venlafaxine 75 MG Extended Release Oral Capsule [Effex

or] Effexor XR          

2021 12:00:00 AM EST        75 mg  oral                 completed         

      [facility]xor XR [employer] 

([facility])

 

                    24 HR venlafaxine 75 MG Extended Release Oral Capsule [Effex

or] Effexor XR          

2021 12:00:00 AM EST        75 mg  oral                 completed         

      [facility] X[facility] [employer] 

([facility])

 

                    24 HR venlafaxine 75 MG Extended Release Oral Capsule [Effex

or] Effexor XR          

2021 12:00:00 AM EST        75 mg  oral                 completed         

      [facility] XR [employer] 

([facility])

 

                    24 HR venlafaxine 75 MG Extended Release Oral Capsule [Effex

or] Effexor XR          

2021 12:00:00 AM EST        75 mg  oral                 completed         

      [facility] X[facility] [employer] 

([facility])

 

                    24 HR venlafaxine 75 MG Extended Release Oral Capsule [Effex

or] Effexor XR          

2021 12:00:00 AM EST        75 mg  oral                 completed         

      [facility] X[facility] [employer] 

([facility])

 

                    24 HR venlafaxine 75 MG Extended Release Oral Capsule [Effex

or] Effexor XR          

2021 12:00:00 AM EST        75 mg  oral                 completed         

      [facility] X[facility] [employer] 

([facility])

 

                    24 HR venlafaxine 75 MG Extended Release Oral Capsule [Effex

or] Effexor XR          

2021 12:00:00 AM EST        75 mg  oral                 completed         

      [facility] X[facility] [employer] 

([facility])

 

                    24 HR venlafaxine 37.5 MG Extended Release Oral Capsule [Eff

exor] Effexor XR          

2020 12:00:00 AM EST        37.5 mg oral                 completed        

       [facility] X[facility] [employer]

 ([facility])

 

                    24 HR venlafaxine 37.5 MG Extended Release Oral Capsule [Eff

exor] Effexor XR          

2020 12:00:00 AM EST        37.5 mg oral                 completed        

       [facility] X[facility] [employer]

 ([facility])

 

                    24 HR venlafaxine 150 MG Extended Release Oral Capsule [Effe

xor] Effexor XR          

2020 12:00:00 AM EST        150 mg oral                 completed         

      [facility]ffe[facility] XR [employer] 

([facility])

 

                          24 HR Bupropion Hydrochloride 300 MG Extended Release 

Oral Tablet [Wellbutrin] 

Wellbutrin XL 2020 12:00:00 AM EST        300 mg oral                 acti

ve               Wellbutrin

 XL                                     [employer] ([facility]

vin Services)

 

                    24 HR venlafaxine 150 MG Extended Release Oral Capsule [Effe

xor] Effexor XR          

2020 12:00:00 AM EST        150 mg oral                 completed         

      [facility]ffexor XR [employer] 

([facility])

 

                    24 HR venlafaxine 37.5 MG Extended Release Oral Capsule [Eff

exor] Effexor XR          

2020 12:00:00 AM EST        37.5 mg oral                 completed        

       [facility] XR [employer]

 ([facility])

 

                    24 HR venlafaxine 150 MG Extended Release Oral Capsule [Effe

xor] Effexor XR          

2020 12:00:00 AM EST        150 mg oral                 completed         

      [facility]ffexor XR [employer] 

([facility])

 

                    24 HR venlafaxine 150 MG Extended Release Oral Capsule [Effe

xor] Effexor XR          

2020 12:00:00 AM EST        150 mg oral                 completed         

      [facility]ffexor XR [employer] 

([facility])

 

                          24 HR Bupropion Hydrochloride 300 MG Extended Release 

Oral Tablet [Wellbutrin] 

Wellbutrin XL 2020 12:00:00 AM EST        300 mg oral                 acti

ve               Wellbutrin

 XL                                     [employer] ([facility]

[facility] Services)

 

                          24 HR Bupropion Hydrochloride 300 MG Extended Release 

Oral Tablet [Wellbutrin] 

Wellbutrin XL 2020 12:00:00 AM EST        300 mg oral                 acti

ve               Wellbutrin

 XL                                     [employer] ([facility]

[facility] Services)

 

                          24 HR Bupropion Hydrochloride 300 MG Extended Release 

Oral Tablet [Wellbutrin] 

Wellbutrin XL 2020 12:00:00 AM EST        300 mg oral                 acti

ve               Wellbutrin

 XL                                     [employer] ([facility]

ving Services)

 

                    24 HR venlafaxine 150 MG Extended Release Oral Capsule [Effe

xor] Effexor XR          

2020 12:00:00 AM EST        150 mg oral                 completed         

      [facility] XR [employer] 

([facility])

 

                    24 HR venlafaxine 37.5 MG Extended Release Oral Capsule [Eff

exor] Effexor XR          

2020 12:00:00 AM EST        37.5 mg oral                 completed        

       [facility] XR [employer]

 ([facility])

 

                    24 HR venlafaxine 150 MG Extended Release Oral Capsule [Effe

xor] Effexor XR          

2020 12:00:00 AM EST        150 mg oral                 completed         

      [facility]ffexor XR [employer] 

([facility])

 

                    24 HR venlafaxine 150 MG Extended Release Oral Capsule [Effe

xor] Effexor XR          

2020 12:00:00 AM EST        150 mg oral                 completed         

      Effexor XR [employer] 

([facility])

 

                    24 HR venlafaxine 37.5 MG Extended Release Oral Capsule [Eff

exor] Effexor XR          

2020 12:00:00 AM EST        37.5 mg oral                 completed        

       [facility] XR [employer]

 ([facility])

 

                          24 HR Bupropion Hydrochloride 300 MG Extended Release 

Oral Tablet [Wellbutrin] 

Wellbutrin XL 2020 12:00:00 AM EST        300 mg oral                 acti

ve               Wellbutrin

 XL                                     [employer] ([facility])

 

                    24 HR venlafaxine 37.5 MG Extended Release Oral Capsule [Eff

exor] Effexor XR          

2020 12:00:00 AM EST        37.5 mg oral                 completed        

       [facility] XR [employer]

 ([facility])

 

                    24 HR venlafaxine 150 MG Extended Release Oral Capsule [Effe

xor] Effexor XR          

2020 12:00:00 AM EST        150 mg oral                 completed         

      [facility]ffexor XR [employer] 

([facility])

 

                    24 HR venlafaxine 150 MG Extended Release Oral Capsule [Effe

xor] Effexor XR          

2020 12:00:00 AM EST        150 mg oral                 completed         

      [facility]ffexor XR [employer] 

([facility])

 

                          24 HR Bupropion Hydrochloride 300 MG Extended Release 

Oral Tablet [Wellbutrin] 

Wellbutrin XL 2020 12:00:00 AM EST        300 mg oral                 acti

ve               Wellbutrin

 XL                                     [employer] ([facility]

vin Services)

 

                    24 HR venlafaxine 37.5 MG Extended Release Oral Capsule [Eff

exor] Effexor XR          

2020 12:00:00 AM EST        37.5 mg oral                 completed        

       [facility] XR [employer]

 ([facility])

 

                          24 HR Bupropion Hydrochloride 300 MG Extended Release 

Oral Tablet [Wellbutrin] 

Wellbutrin XL 2020 12:00:00 AM EST        300 mg oral                 acti

ve               Wellbutrin

 XL                                     [employer] ([facility]

[facility] Services)

 

                          24 HR Bupropion Hydrochloride 300 MG Extended Release 

Oral Tablet [Wellbutrin] 

Wellbutrin XL 2020 12:00:00 AM EST        300 mg oral                 acti

ve               Wellbutrin

 XL                                     [employer] ([facility]

[facility] Services)

 

                    24 HR venlafaxine 37.5 MG Extended Release Oral Capsule [Eff

exor] Effexor XR          

2020 12:00:00 AM EST        37.5 mg oral                 completed        

       [facility] XR [employer]

 ([facility])

 

                          24 HR Bupropion Hydrochloride 300 MG Extended Release 

Oral Tablet [Wellbutrin] 

Wellbutrin XL 2020 12:00:00 AM EST        300 mg oral                 acti

ve               Wellbutrin

 XL                                     [employer] ([facility]

[facility])

 

                    24 HR venlafaxine 37.5 MG Extended Release Oral Capsule [Eff

exor] Effexor XR          

2020 12:00:00 AM EST        37.5 mg oral                 completed        

       [facility] XR [employer]

 ([facility])

 

                    24 HR venlafaxine 150 MG Extended Release Oral Capsule [Effe

xor] Effexor XR          

2020 12:00:00 AM EST        150 mg oral                 completed         

      Effexor XR [employer] 

([facility])

 

                          24 HR Bupropion Hydrochloride 300 MG Extended Release 

Oral Tablet [Wellbutrin] 

Wellbutrin XL 2020 12:00:00 AM EST        300 mg oral                 acti

ve               Wellbutrin

 XL                                     [employer] ([facility]

[facility])

 

                    24 HR venlafaxine 37.5 MG Extended Release Oral Capsule [Eff

exor] Effexor XR          

2020 12:00:00 AM EST        37.5 mg oral                 completed        

       [facility] XR [employer]

 ([facility])

 

                    24 HR venlafaxine 37.5 MG Extended Release Oral Capsule [Eff

exor] Effexor XR          

2020 12:00:00 AM EST        37.5 mg oral                 completed        

       [facility] XR [employer]

 ([facility] [facility]

 

                    24 HR venlafaxine 150 MG Extended Release Oral Capsule [Effe

xor] Effexor XR          

2020 12:00:00 AM EST        150 mg oral                 completed         

      Effexor XR [facility] 

([facility])

 

                    24 HR venlafaxine 37.5 MG Extended Release Oral Capsule [Eff

exor] Effexor XR          

2020 12:00:00 AM EST        37.5 mg oral                 completed        

       Effexor XR [facility]

 ([facility])

 

                    24 HR venlafaxine 150 MG Extended Release Oral Capsule [Effe

xor] Effexor XR          

2020 12:00:00 AM EST        150 mg oral                 completed         

      Effexor XR [facility] 

([facility])

 

                    24 HR venlafaxine 37.5 MG Extended Release Oral Capsule [Eff

exor] Effexor XR          

2020 12:00:00 AM EST        37.5 mg oral                 completed        

       Effexor XR [facility]

 ([facility])

 

                    24 HR venlafaxine 150 MG Extended Release Oral Capsule [Effe

xor] Effexor XR          

2020 12:00:00 AM EST        150 mg oral                 completed         

      Effexor XR [facility] 

([facility])

 

                    24 HR venlafaxine 37.5 MG Extended Release Oral Capsule [Eff

exor] Effexor XR          

2020 12:00:00 AM EST        37.5 mg oral                 completed        

       Effexor XR [facility]

 ([facility])

 

                    24 HR venlafaxine 150 MG Extended Release Oral Capsule [Effe

xor] Effexor XR          

2020 12:00:00 AM EST        150 mg oral                 completed         

      Effexor XR [facility] 

([facility])

 

             Mirtazapine 15 MG Oral Tablet [Remeron] Remeron      2020 12:

00:00 AM EST              15 

mg      oral                    completed                 Remeron [facility] ([facility])

 

                    24 HR venlafaxine 150 MG Extended Release Oral Capsule [Effe

xor] Effexor XR          

2020 12:00:00 AM EST        150 mg oral                 completed         

      Effexor XR [facility] 

([facility])

 

             Mirtazapine 15 MG Oral Tablet [Remeron] Remeron      2020 12:

00:00 AM EST              15 

mg      oral                    completed                 Remeron [facility] ([facility])

 

                    24 HR venlafaxine 150 MG Extended Release Oral Capsule [Effe

xor] Effexor XR          

2020 12:00:00 AM EST        150 mg oral                 completed         

      Effexor XR [facility] 

([facility])

 

             Mirtazapine 15 MG Oral Tablet [Remeron] Remeron      2020 12:

00:00 AM EST              15 

mg      oral                    completed                 Remeron [facility] ([facility])

 

                    24 HR venlafaxine 37.5 MG Extended Release Oral Capsule [Eff

exor] Effexor XR          

2020 12:00:00 AM EST        37.5 mg oral                 completed        

       Effexor XR [facility]

 ([facility])

 

             Mirtazapine 15 MG Oral Tablet [Remeron] Remeron      2020 12:

00:00 AM EST              15 

mg      oral                    completed                 Remeron [facility] ([facility])

 

                    24 HR venlafaxine 150 MG Extended Release Oral Capsule [Effe

xor] Effexor XR          

2020 12:00:00 AM EST        150 mg oral                 completed         

      Effexor XR [facility] 

([facility])

 

             Mirtazapine 15 MG Oral Tablet [Remeron] Remeron      2020 12:

00:00 AM EST              15 

mg      oral                    completed                 Remeron [facility] ([facility])

 

                    24 HR venlafaxine 37.5 MG Extended Release Oral Capsule [Eff

exor] Effexor XR          

2020 12:00:00 AM EST        37.5 mg oral                 completed        

       Effexor XR [facility]

 ([facility])

 

                    24 HR venlafaxine 37.5 MG Extended Release Oral Capsule [Eff

exor] Effexor XR          

2020 12:00:00 AM EST        37.5 mg oral                 completed        

       Effexor XR [facility]

 ([facility])

 

             Mirtazapine 15 MG Oral Tablet [Remeron] Remeron      2020 12:

00:00 AM EST              15 

mg      oral                    completed                 Remeron [facility] ([facility])

 

                    24 HR venlafaxine 150 MG Extended Release Oral Capsule [Effe

xor] Effexor XR          

2020 12:00:00 AM EST        150 mg oral                 completed         

      Effexor XR [facility] 

([facility])

 

                    24 HR venlafaxine 37.5 MG Extended Release Oral Capsule [Eff

exor] Effexor XR          

2020 12:00:00 AM EST        37.5 mg oral                 completed        

       Effexor XR [facility]

 ([facility])

 

             Mirtazapine 15 MG Oral Tablet [Remeron] Remeron      2020 12:

00:00 AM EST              15 

mg      oral                    completed                 Remeron [facility] ([facility])

 

                    24 HR venlafaxine 150 MG Extended Release Oral Capsule [Effe

xor] Effexor XR          

2020 12:00:00 AM EST        150 mg oral                 completed         

      Effexor XR [facility] 

([facility])

 

             Mirtazapine 15 MG Oral Tablet [Remeron] Remeron      2020 12:

00:00 AM EST              15 

mg      oral                    completed                 Remeron [facility] ([facility])

 

                    24 HR venlafaxine 150 MG Extended Release Oral Capsule [Effe

xor] Effexor XR          

2020 12:00:00 AM EST        150 mg oral                 completed         

      Effexor XR [facility] 

([facility])

 

             Mirtazapine 15 MG Oral Tablet [Remeron] Remeron      2020 12:

00:00 AM EST              15 

mg      oral                    completed                 Remeron [facility] ([facility])

 

                    24 HR venlafaxine 37.5 MG Extended Release Oral Capsule [Eff

exor] Effexor XR          

2020 12:00:00 AM EST        37.5 mg oral                 completed        

       Effexor XR [facility]

 ([facility])

 

             Mirtazapine 15 MG Oral Tablet [Remeron] Remeron      2020 12:

00:00 AM EST              15 

mg      oral                    completed                 Remeron [facility] ([facility])

 

                    24 HR venlafaxine 37.5 MG Extended Release Oral Capsule [Eff

exor] Effexor XR          

2020 12:00:00 AM EST        37.5 mg oral                 completed        

       Effexor XR [facility]

 ([facility])

 

                    Hydroxyzine Hydrochloride 25 MG Oral Tablet hydroxyzine HCl 

    2020 12:00:00 

AM EST        25 mg  oral                 completed               hydroxyzine HC

l [facility] ([facility])

 

                    Hydroxyzine Hydrochloride 25 MG Oral Tablet hydroxyzine HCl 

    2020 12:00:00 

AM EST        25 mg  oral                 completed               hydroxyzine HC

l [facility] ([facility])

 

                    Hydroxyzine Hydrochloride 25 MG Oral Tablet hydroxyzine HCl 

    2020 12:00:00 

AM EST        25 mg  oral                 completed               hydroxyzine HC

[facility]

 

                    Hydroxyzine Hydrochloride 25 MG Oral Tablet hydroxyzine HCl 

    2020 12:00:00 

AM EST        25 mg  oral                 completed               hydroxyzine HC

[facility]

 

                    Hydroxyzine Hydrochloride 25 MG Oral Tablet hydroxyzine HCl 

    2020 12:00:00 

AM EST        25 mg  oral                 completed               hydroxyzine HC

[facility]

 

                    Hydroxyzine Hydrochloride 25 MG Oral Tablet hydroxyzine HCl 

    2020 12:00:00 

AM EST        25 mg  oral                 completed               hydroxyzine HC

l [facility] ([facility])

 

                    Hydroxyzine Hydrochloride 25 MG Oral Tablet hydroxyzine HCl 

    2020 12:00:00 

AM EST        25 mg  oral                 completed               hydroxyzine HC

l [facility] ([facility])

 

                    Hydroxyzine Hydrochloride 25 MG Oral Tablet hydroxyzine HCl 

    2020 12:00:00 

AM EST        25 mg  oral                 completed               hydroxyzine HC

l [facility] ([facility])

 

                    Hydroxyzine Hydrochloride 25 MG Oral Tablet hydroxyzine HCl 

    2020 12:00:00 

AM EST        25 mg  oral                 completed               hydroxyzine HC

l [facility] ([facility])

 

                    Hydroxyzine Hydrochloride 25 MG Oral Tablet hydroxyzine HCl 

    2020 12:00:00 

AM EST        25 mg  oral                 completed               hydroxyzine HC

l [facility] ([facility])

 

                    24 HR venlafaxine 37.5 MG Extended Release Oral Capsule [Eff

exor] Effexor XR          

10/27/2020 12:00:00 AM EDT        37.5 mg oral                 completed        

       Effexor XR [facility]

 ([facility])

 

                    24 HR venlafaxine 150 MG Extended Release Oral Capsule [Effe

xor] Effexor XR          

10/27/2020 12:00:00 AM EDT        150 mg oral                 completed         

      Effexor XR [facility] 

([facility])

 

                    24 HR venlafaxine 150 MG Extended Release Oral Capsule [Effe

xor] Effexor XR          

10/27/2020 12:00:00 AM EDT        150 mg oral                 completed         

      Effexor XR [facility] 

([facility])

 

                    24 HR venlafaxine 37.5 MG Extended Release Oral Capsule [Eff

exor] Effexor XR          

10/27/2020 12:00:00 AM EDT        37.5 mg oral                 completed        

       Effexor XR [facility]

 ([facility])

 

                    24 HR venlafaxine 150 MG Extended Release Oral Capsule [Effe

xor] Effexor XR          

10/27/2020 12:00:00 AM EDT        150 mg oral                 completed         

      Effexor XR [facility] 

([facility])

 

                    24 HR venlafaxine 37.5 MG Extended Release Oral Capsule [Eff

exor] Effexor XR          

10/27/2020 12:00:00 AM EDT        37.5 mg oral                 completed        

       Effexor XR [facility]

 ([facility])

 

                    24 HR venlafaxine 37.5 MG Extended Release Oral Capsule [Eff

exor] Effexor XR          

10/27/2020 12:00:00 AM EDT        37.5 mg oral                 completed        

       Effexor XR [facility]

 ([facility])

 

                    24 HR venlafaxine 150 MG Extended Release Oral Capsule [Effe

xor] Effexor XR          

10/27/2020 12:00:00 AM EDT        150 mg oral                 completed         

      Effexor XR [facility] 

([facility])

 

                    24 HR venlafaxine 37.5 MG Extended Release Oral Capsule [Eff

exor] Effexor XR          

10/27/2020 12:00:00 AM EDT        37.5 mg oral                 completed        

       Effexor XR [facility]

 ([facility])

 

                    24 HR venlafaxine 150 MG Extended Release Oral Capsule [Effe

xor] Effexor XR          

10/27/2020 12:00:00 AM EDT        150 mg oral                 completed         

      Effexor XR [facility] 

([facility])

 

                    24 HR venlafaxine 37.5 MG Extended Release Oral Capsule [Eff

exor] Effexor XR          

10/27/2020 12:00:00 AM EDT        37.5 mg oral                 completed        

       Effexor XR [facility]

 ([facility])

 

                    24 HR venlafaxine 37.5 MG Extended Release Oral Capsule [Eff

exor] Effexor XR          

10/27/2020 12:00:00 AM EDT        37.5 mg oral                 completed        

       Effexor XR [facility]

 ([facility])

 

                    24 HR venlafaxine 150 MG Extended Release Oral Capsule [Effe

xor] Effexor XR          

10/27/2020 12:00:00 AM EDT        150 mg oral                 completed         

      Effexor XR [facility] 

([facility])

 

                    24 HR venlafaxine 150 MG Extended Release Oral Capsule [Effe

xor] Effexor XR          

10/27/2020 12:00:00 AM EDT        150 mg oral                 completed         

      Effexor XR [facility] 

([facility])

 

                    24 HR venlafaxine 37.5 MG Extended Release Oral Capsule [Eff

exor] Effexor XR          

10/27/2020 12:00:00 AM EDT        37.5 mg oral                 completed        

       Effexor XR [facility]

 ([facility])

 

                    24 HR venlafaxine 150 MG Extended Release Oral Capsule [Effe

xor] Effexor XR          

10/27/2020 12:00:00 AM EDT        150 mg oral                 completed         

      Effexor XR [facility] 

([facility])

 

                    24 HR venlafaxine 37.5 MG Extended Release Oral Capsule [Eff

exor] Effexor XR          

10/27/2020 12:00:00 AM EDT        37.5 mg oral                 completed        

       Effexor XR [facility]

 ([facility])

 

                    24 HR venlafaxine 37.5 MG Extended Release Oral Capsule [Eff

exor] Effexor XR          

10/27/2020 12:00:00 AM EDT        37.5 mg oral                 completed        

       Effexor XR [facility]

 ([facility])

 

                    24 HR venlafaxine 150 MG Extended Release Oral Capsule [Effe

xor] Effexor XR          

10/27/2020 12:00:00 AM EDT        150 mg oral                 completed         

      Effexor XR [facility] 

([facility])

 

                    24 HR venlafaxine 150 MG Extended Release Oral Capsule [Effe

xor] Effexor XR          

10/27/2020 12:00:00 AM EDT        150 mg oral                 completed         

      Effexor XR [facility] 

([facility])

 

                Trazodone Hydrochloride 50 MG Oral Tablet trazodone       2020 12:00:00 AM EDT  

        50 mg   oral                    completed                 trazodone [facility] rich[facility] ([facility])

 

                Trazodone Hydrochloride 50 MG Oral Tablet trazodone       2020 12:00:00 AM EDT  

        50 mg   oral                    completed                 trazodone [facility]

[facility] ([facility])

 

                Trazodone Hydrochloride 50 MG Oral Tablet trazodone       2020 12:00:00 AM EDT  

        50 mg   oral                    completed                 trazodone [facility]

[facility] ([facility])

 

                Trazodone Hydrochloride 50 MG Oral Tablet trazodone       2020 12:00:00 AM EDT  

        50 mg   oral                    completed                 trazodone [facility]

[facility] ([facility])

 

                Trazodone Hydrochloride 50 MG Oral Tablet trazodone       2020 12:00:00 AM EDT  

        50 mg   oral                    completed                 trazodone [facility]

[facility] ([facility])

 

                Trazodone Hydrochloride 50 MG Oral Tablet trazodone       2020 12:00:00 AM EDT  

        50 mg   oral                    completed                 trazodone [facility]

[facility] ([facility])

 

                Trazodone Hydrochloride 50 MG Oral Tablet trazodone       2020 12:00:00 AM EDT  

        50 mg   oral                    completed                 trazodone [facility]

[facility] ([facility])

 

                Trazodone Hydrochloride 50 MG Oral Tablet trazodone       2020 12:00:00 AM EDT  

        50 mg   oral                    completed                 trazodone [facility]

[facility] ([facility])

 

                Trazodone Hydrochloride 50 MG Oral Tablet trazodone       2020 12:00:00 AM EDT  

        50 mg   oral                    completed                 trazodone [facility]

[facility] ([facility])

 

                Trazodone Hydrochloride 50 MG Oral Tablet trazodone       2020 12:00:00 AM EDT  

        50 mg   oral                    completed                 trazodone [facility]

[facility] ([facility])

 

                    24 HR venlafaxine 150 MG Extended Release Oral Capsule venla

faxine         2020 

12:00:00 AM EDT        150 mg oral                 completed               venla

faxine [facility] ([facility])

 

                    24 HR venlafaxine 150 MG Extended Release Oral Capsule venla

faxine         2020 

12:00:00 AM EDT        150 mg oral                 completed               venla

faxine [facility] ([facility])

 

                    24 HR venlafaxine 37.5 MG Extended Release Oral Capsule venl

afaxine         2020 

12:00:00 AM EDT        37.5 mg oral                 completed               venl

afaxine [facility] ([facility])

 

                    24 HR venlafaxine 37.5 MG Extended Release Oral Capsule venl

afaxine         2020 

12:00:00 AM EDT        37.5 mg oral                 completed               venl

afaxine [facility] ([facility])

 

                    24 HR venlafaxine 150 MG Extended Release Oral Capsule venla

faxine         2020 

12:00:00 AM EDT        150 mg oral                 completed               venla

faxine [facility] ([facility])

 

                    24 HR venlafaxine 37.5 MG Extended Release Oral Capsule venl

afaxine         2020 

12:00:00 AM EDT        37.5 mg oral                 completed               venl

afaxine [facility] ([facility])

 

                    24 HR venlafaxine 37.5 MG Extended Release Oral Capsule venl

afaxine         2020 

12:00:00 AM EDT        37.5 mg oral                 completed               venl

afaxine [facility] ([facility])

 

                    24 HR venlafaxine 37.5 MG Extended Release Oral Capsule venl

afaxine         2020 

12:00:00 AM EDT        37.5 mg oral                 completed               venl

afaxine [facility] ([facility])

 

                    24 HR venlafaxine 150 MG Extended Release Oral Capsule venla

faxine         2020 

12:00:00 AM EDT        150 mg oral                 completed               venla

faxine [facility] ([facility])

 

                    24 HR venlafaxine 37.5 MG Extended Release Oral Capsule venl

afaxine         2020 

12:00:00 AM EDT        37.5 mg oral                 completed               venl

afaxine [facility] ([facility])

 

                    24 HR venlafaxine 150 MG Extended Release Oral Capsule venla

faxine         2020 

12:00:00 AM EDT        150 mg oral                 completed               venla

faxine [facility] ([facility])

 

                    24 HR venlafaxine 37.5 MG Extended Release Oral Capsule venl

afaxine         2020 

12:00:00 AM EDT        37.5 mg oral                 completed               venl

afaxine [facility] ([facility]

 

                    24 HR venlafaxine 150 MG Extended Release Oral Capsule venla

faxine         2020 

12:00:00 AM EDT        150 mg oral                 completed               venla

faxine [facility] ([facility])

 

                    24 HR venlafaxine 37.5 MG Extended Release Oral Capsule venl

afaxine         2020 

12:00:00 AM EDT        37.5 mg oral                 completed               venl

afaxine [facility] ([facility])

 

                    24 HR venlafaxine 37.5 MG Extended Release Oral Capsule venl

afaxine         2020 

12:00:00 AM EDT        37.5 mg oral                 completed               venl

afaxine [facility] ([facility])

 

                    24 HR venlafaxine 37.5 MG Extended Release Oral Capsule venl

afaxine         2020 

12:00:00 AM EDT        37.5 mg oral                 completed               venl

afaxine [facility] ([facility])

 

                    24 HR venlafaxine 150 MG Extended Release Oral Capsule venla

faxine         2020 

12:00:00 AM EDT        150 mg oral                 completed               venla

faxine [facility] ([facility])

 

                    24 HR venlafaxine 150 MG Extended Release Oral Capsule venla

faxine         2020 

12:00:00 AM EDT        150 mg oral                 completed               venla

faxine [facility] ([facility])

 

                    24 HR venlafaxine 150 MG Extended Release Oral Capsule venla

faxine         2020 

12:00:00 AM EDT        150 mg oral                 completed               venla

faxine [facility] ([facility])

 

                    24 HR venlafaxine 150 MG Extended Release Oral Capsule venla

faxine         2020 

12:00:00 AM EDT        150 mg oral                 completed               venla

faxine [facility] ([facility])

 

                    24 HR venlafaxine 150 MG Extended Release Oral Capsule venla

faxine         2020 

12:00:00 AM EDT        150 mg oral                 completed               venla

faxine [employer] ([facility])

 

                    24 HR venlafaxine 150 MG Extended Release Oral Capsule venla

faxine         2020 

12:00:00 AM EDT        150 mg oral                 completed               venla

[facility]ine [employer] ([facility])

 

                    24 HR venlafaxine 37.5 MG Extended Release Oral Capsule venl

afaxine         2020 

12:00:00 AM EDT        37.5 mg oral                 completed               venl

afaxine [employer] ([facility])

 

                    24 HR venlafaxine 37.5 MG Extended Release Oral Capsule venl

afaxine         2020 

12:00:00 AM EDT        37.5 mg oral                 completed               venl

afaxine [employer] ([facility])

 

                    24 HR venlafaxine 37.5 MG Extended Release Oral Capsule venl

afaxine         2020 

12:00:00 AM EDT        37.5 mg oral                 completed               venl

afaxine [employer] ([facility])

 

                    24 HR venlafaxine 150 MG Extended Release Oral Capsule venla

faxine         2020 

12:00:00 AM EDT        150 mg oral                 completed               venla

faxine [employer] ([facility])

 

                    24 HR venlafaxine 150 MG Extended Release Oral Capsule venla

faxine         2020 

12:00:00 AM EDT        150 mg oral                 completed               venla

faxine [employer] ([facility])

 

                    24 HR venlafaxine 150 MG Extended Release Oral Capsule venla

faxine         2020 

12:00:00 AM EDT        150 mg oral                 completed               venla

faxine [employer] ([facility])

 

                    24 HR venlafaxine 150 MG Extended Release Oral Capsule venla

faxine         2020 

12:00:00 AM EDT        150 mg oral                 completed               venla

faxine [employer] ([facility])

 

                    24 HR venlafaxine 37.5 MG Extended Release Oral Capsule venl

afaxine         2020 

12:00:00 AM EDT        37.5 mg oral                 completed               venl

afaxine [employer] ([facility])

 

                    24 HR venlafaxine 37.5 MG Extended Release Oral Capsule venl

afaxine         2020 

12:00:00 AM EDT        37.5 mg oral                 completed               venl

afaxine [employer] ([facility])

 

                    24 HR venlafaxine 37.5 MG Extended Release Oral Capsule venl

afaxine         2020 

12:00:00 AM EDT        37.5 mg oral                 completed               venl

afaxine [employer] ([facility])

 

                    24 HR venlafaxine 37.5 MG Extended Release Oral Capsule venl

afaxine         2020 

12:00:00 AM EDT        37.5 mg oral                 completed               venl

afaxine [employer] ([facility])

 

                    24 HR venlafaxine 37.5 MG Extended Release Oral Capsule venl

afaxine         2020 

12:00:00 AM EDT        37.5 mg oral                 completed               venl

afaxine [employer] ([facility])

 

                    24 HR venlafaxine 37.5 MG Extended Release Oral Capsule venl

afaxine         2020 

12:00:00 AM EDT        37.5 mg oral                 completed               venl

afaxine [employer] ([facility])

 

                    24 HR venlafaxine 37.5 MG Extended Release Oral Capsule venl

afaxine         2020 

12:00:00 AM EDT        37.5 mg oral                 completed               venl

afaxine [employer] ([facility])

 

                    24 HR venlafaxine 150 MG Extended Release Oral Capsule venla

faxine         2020 

12:00:00 AM EDT        150 mg oral                 completed               venla

faxine [employer] ([facility])

 

                    24 HR venlafaxine 150 MG Extended Release Oral Capsule venla

faxine         2020 

12:00:00 AM EDT        150 mg oral                 completed               venla

faxine [employer] ([facility])

 

                    24 HR venlafaxine 150 MG Extended Release Oral Capsule venla

faxine         2020 

12:00:00 AM EDT        150 mg oral                 completed               venla

faxine [employer] ([facility])

 

                    24 HR venlafaxine 150 MG Extended Release Oral Capsule venla

faxine         2020 

12:00:00 AM EDT        150 mg oral                 completed               venla

faxine [employer] ([facility])

 

                    Hydroxyzine Hydrochloride 25 MG Oral Tablet hydroxyzine HCl 

    2020 12:00:00 

AM EDT        25 mg  oral                 completed               hydroxyzine HC

l [employer] ([facility])

 

                    Hydroxyzine Hydrochloride 25 MG Oral Tablet hydroxyzine HCl 

    2020 12:00:00 

AM EDT        25 mg  oral                 completed               hydroxyzine HC

l [employer] ([facility])

 

                    Hydroxyzine Hydrochloride 25 MG Oral Tablet hydroxyzine HCl 

    2020 12:00:00 

AM EDT        25 mg  oral                 completed               hydroxyzine HC

l [employer] ([facility])

 

                    Hydroxyzine Hydrochloride 25 MG Oral Tablet hydroxyzine HCl 

    2020 12:00:00 

AM EDT        25 mg  oral                 completed               hydroxyzine HC

l [employer] ([facility])

 

                    Hydroxyzine Hydrochloride 25 MG Oral Tablet hydroxyzine HCl 

    2020 12:00:00 

AM EDT        25 mg  oral                 completed               hydroxyzine HC

l [employer] ([facility])

 

                    Hydroxyzine Hydrochloride 25 MG Oral Tablet hydroxyzine HCl 

    2020 12:00:00 

AM EDT        25 mg  oral                 completed               [facility] HC

[facility]

 

                    Hydroxyzine Hydrochloride 25 MG Oral Tablet hydroxyzine HCl 

    2020 12:00:00 

AM EDT        25 mg  oral                 completed               hydroxyzine HC

[facility]

 

                    Hydroxyzine Hydrochloride 25 MG Oral Tablet hydroxyzine HCl 

    2020 12:00:00 

AM EDT        25 mg  oral                 completed               hydroxyzine HC

[facility]

 

                    Hydroxyzine Hydrochloride 25 MG Oral Tablet hydroxyzine HCl 

    2020 12:00:00 

AM EDT        25 mg  oral                 completed               hydroxyzine HC

[facility]

 

                    Hydroxyzine Hydrochloride 25 MG Oral Tablet hydroxyzine HCl 

    2020 12:00:00 

AM EDT        25 mg  oral                 completed               hydroxyzine HC

[facility]

 

                    24 HR Bupropion Hydrochloride 300 MG Extended Release Oral T

ablet bupropion HCl       

2020 12:00:00 AM EDT        300 mg oral                 completed         

      bupropion HCl 

[facility] ([facility])

 

                Trazodone Hydrochloride 50 MG Oral Tablet trazodone       2020 12:00:00 AM EDT  

        50 mg   oral                    completed                 trazodone [facility] ([facility] 

[facility])

 

                    24 HR Bupropion Hydrochloride 300 MG Extended Release Oral T

ablet bupropion HCl       

2020 12:00:00 AM EDT        300 mg oral                 completed         

      bupropion HCl 

[facility] ([facility])

 

                Trazodone Hydrochloride 50 MG Oral Tablet trazodone       2020 12:00:00 AM EDT  

        50 mg   oral                    completed                 trazodone [facility] ([facility] 

[facility])

 

                Trazodone Hydrochloride 50 MG Oral Tablet trazodone       2020 12:00:00 AM EDT  

        50 mg   oral                    completed                 trazodone [facility]

[facility] ([facility] 

[facility])

 

                    24 HR Bupropion Hydrochloride 300 MG Extended Release Oral T

ablet bupropion HCl       

2020 12:00:00 AM EDT        300 mg oral                 completed         

      bupropion HCl 

[employer] ([facility])

 

                Trazodone Hydrochloride 50 MG Oral Tablet trazodone       2020 12:00:00 AM EDT  

        50 mg   oral                    completed                 trazodone [facility]

[facility] ([facility] 

[facility])

 

                    24 HR Bupropion Hydrochloride 300 MG Extended Release Oral T

ablet bupropion HCl       

2020 12:00:00 AM EDT        300 mg oral                 completed         

      bupropion HCl 

[employer] ([facility])

 

                    24 HR Bupropion Hydrochloride 300 MG Extended Release Oral T

ablet bupropion HCl       

2020 12:00:00 AM EDT        300 mg oral                 completed         

      bupropion HCl 

[employer] ([facility])

 

                Trazodone Hydrochloride 50 MG Oral Tablet trazodone       2020 12:00:00 AM EDT  

        50 mg   oral                    completed                 trazodone [facility] ([facility] 

[facility])

 

                Trazodone Hydrochloride 50 MG Oral Tablet trazodone       2020 12:00:00 AM EDT  

        50 mg   oral                    completed                 trazodone [facility] ([facility] 

[facility])

 

                    24 HR Bupropion Hydrochloride 300 MG Extended Release Oral T

ablet bupropion HCl       

2020 12:00:00 AM EDT        300 mg oral                 completed         

      bupropion HCl 

[facility] ([facility])

 

                Trazodone Hydrochloride 50 MG Oral Tablet trazodone       2020 12:00:00 AM EDT  

        50 mg   oral                    completed                 trazodone [facility]

[facility] ([facility] 

[facility])

 

                    24 HR Bupropion Hydrochloride 300 MG Extended Release Oral T

ablet bupropion HCl       

2020 12:00:00 AM EDT        300 mg oral                 completed         

      bupropion HCl 

[facility] ([facility])

 

                    24 HR Bupropion Hydrochloride 300 MG Extended Release Oral T

ablet bupropion HCl       

2020 12:00:00 AM EDT        300 mg oral                 completed         

      bupropion HCl 

[facility] ([facility])

 

                    24 HR Bupropion Hydrochloride 300 MG Extended Release Oral T

ablet bupropion HCl       

2020 12:00:00 AM EDT        300 mg oral                 completed         

      bupropion HCl 

[facility] ([facility])

 

                Trazodone Hydrochloride 50 MG Oral Tablet trazodone       2020 12:00:00 AM EDT  

        50 mg   oral                    completed                 trazodone [facility]

[facility] ([facility] 

[facility])

 

                Trazodone Hydrochloride 50 MG Oral Tablet trazodone       2020 12:00:00 AM EDT  

        50 mg   oral                    completed                 trazodone [facility]

[facility] ([facility] 

[facility])

 

                Trazodone Hydrochloride 50 MG Oral Tablet trazodone       2020 12:00:00 AM EDT  

        50 mg   oral                    completed                 trazodone [facility]

[facility] ([facility] 

[facility])

 

                    24 HR Bupropion Hydrochloride 300 MG Extended Release Oral T

ablet bupropion HCl       

2020 12:00:00 AM EDT        300 mg oral                 completed         

      bupropion HCl 

[employer] ([facility])

 

                buspirone hydrochloride 15 MG Oral Tablet buspirone       2019 12:00:00 AM EDT  

        15 mg   oral                    completed                 buspirone [facility]

[facility] ([facility] 

[facility])

 

                buspirone hydrochloride 15 MG Oral Tablet buspirone       2019 12:00:00 AM EDT  

        15 mg   oral                    completed                 buspirone [facility]

[facility] ([facility] 

[facility])

 

                buspirone hydrochloride 15 MG Oral Tablet buspirone       2019 12:00:00 AM EDT  

        15 mg   oral                    completed                 buspirone [facility]

[facility] ([facility] 

[facility])

 

                buspirone hydrochloride 15 MG Oral Tablet buspirone       2019 12:00:00 AM EDT  

        15 mg   oral                    completed                 buspirone [facility]

[facility] ([facility] 

[facility])

 

                buspirone hydrochloride 15 MG Oral Tablet buspirone       2019 12:00:00 AM EDT  

        15 mg   oral                    completed                 buspirone [facility]

[facility] ([facility])

 

                buspirone hydrochloride 15 MG Oral Tablet buspirone       2019 12:00:00 AM EDT  

        15 mg   oral                    completed                 buspirone [facility]

[facility] ([facility])

 

                buspirone hydrochloride 15 MG Oral Tablet buspirone       2019 12:00:00 AM EDT  

        15 mg   oral                    completed                 buspirone [facility]

[facility] ([facility] 

[facility])

 

                buspirone hydrochloride 15 MG Oral Tablet buspirone       2019 12:00:00 AM EDT  

        15 mg   oral                    completed                 buspirone [facility]

[facility] ([facility])

 

                buspirone hydrochloride 15 MG Oral Tablet buspirone       2019 12:00:00 AM EDT  

        15 mg   oral                    completed                 buspirone [facility]

[facility] ([facility] 

[facility])

 

                buspirone hydrochloride 15 MG Oral Tablet buspirone       2019 12:00:00 AM EDT  

        15 mg   oral                    completed                 buspirone [facility]

[facility] ([facility] 

[facility])



                                                                                
                                                                                
                                                                                
                                                                                
                                                                                
                                                                                
                                                                                
                                                                                
                                                                                
                                                                                
                                                                                
                                                                                
                                                                                
                                                                                
                                                                                
                                                                                
                                                                                
                                                                                
                                                                                
                                                                                
                                                                                
                                                                                
                                                                                
                                                                                
                                                                                
                                                                                
                                                                                
                                                                                
                                                                                
                                                                                
                                                                                
                                                                                
                                                                                
                                                                                
                                                                                
                                                          



Insurance Providers

          



             Payer name   Policy type / Coverage type Policy ID    Covered party

 ID Covered 

party's relationship to davis Policy [place]             Plan Information

 

          [facility] [employer]ECK PLAN           [id number]           SP            

      [id number]

 

          EXCELLUS  I         [id number]           Self                [id number]

[id number]

 

           [place] [employer] [id number]

[id number] Self       

                                        [id number]

 

          Managed Care [employer] P         [id number]           S                

   [id number]

 

           [employer] [id number]

867.0 Self       

                                        [id number]

 

           [employer] [id number]

867.0 Self       

                                        [id number]

 

                [employer]    Commercial      [id number]     

[id number] Self                              

      [id number]

 

           [place] [employer] [id number]

867.0 Self       

                                        [id number]

 

          [employer]   I         [id number]           Self                [id number]

 

          [facility]           [id number]           SP             

     [id number]

 

          [facility]         [id number]           S      

             [id number]

 

                [place] [employer] Commercial      [id number]     

[id number] Self                                    7

[id number]

 

                [employer] Commercial      [id number]     

[id number] Self                                    7

[id number]

 

                [place] [employer] Commercial      [id number]     

[id number] Self                                    7

[id number]

 

          [employer] [place]         [id number] S                   74

[id number]

 

          [facility] PLAN [facility]           [id number]           SP           

       [id number]

 

          MEDICAID            [id number]            SP                  [id number]

 

          [place]           [id number]            SP                  [id number]

[id number]

 

                              [id number]                               [id number]

[id number]

 

          [facility]           [id number]           SP             

     [id number]

 

          [employer]             [id number]           SP                  [id number]

[id number]

 

          EMED[place]              [id number]            SP                  [id number]

 

          [facility]           [id number]           SP             

     [id number]

 

          Medicaid  S         [id number]            S                   [id number]

 

          D [employer] Medicare Dental O         [id number]           S   

                [id number]

 

          [facility] P         [id number]           S      

             [id number]

 

          Medicare  O         [id number]           S                   [id number]

[id number]

 

                [employer] [facility] Commercial      [id number]       

[id number] Self                                    

[id number]

 

                [employer]/CHP/[employer] Commercial      [id number]     

[id number] Self                                    7

[id number]



                                                                                
                                                                                
                                                                                
                                                                                
                                                



Problems, Conditions, and Diagnoses

          



           Code       Display Name Description Problem Type Effective Dates Data

 Source(s)

 

             [id number]     Generalized anxiety disorder Generalized anxiety disor

dennise Condition    

2021 12:00:00 AM EST              [facility] ([facility]

[facility] 

[facility])

 

             [id number]     Generalized anxiety disorder Generalized anxiety disor

dennise Condition    

2021 12:00:00 AM EST              [facility] ([facility]

[facility] 

[facility])

 

             [id number]     Generalized anxiety disorder Generalized anxiety disor

dennise Condition    

2021 12:00:00 AM EST              [facility] ([facility]

[facility] 

Services)

 

             [id number]     Generalized anxiety disorder Generalized anxiety disor

dennise Condition    

2021 12:00:00 AM EST              [facility] ([facility]

[facility] 

Services)

 

             [id number]     Generalized anxiety disorder Generalized anxiety disor

dennise Condition    

2021 12:00:00 AM EST              [facility] ([facility]

[facility] 

Services)

 

             [id number]     Generalized anxiety disorder Generalized anxiety disor

dennise Condition    

2021 12:00:00 AM EST              [facility] ([facility]

ving 

Services)

 

             [id number]     Generalized anxiety disorder Generalized anxiety disor

dennise Condition    

2021 12:00:00 AM EST              [employer] ([facility]

ving 

Services)

 

             [id number]     Generalized anxiety disorder Generalized anxiety disor

dennise Condition    

2021 12:00:00 AM EST              [employer] ([facility]

ving 

Services)

 

             [id number]     Generalized anxiety disorder Generalized anxiety disor

dennise Condition    

2021 12:00:00 AM EST              [employer] ([facility]

ving 

Services)

 

             [id number]     Generalized anxiety disorder Generalized anxiety disor

dennise Condition    

2021 12:00:00 AM EST              [employer] ([facility]

ving 

Services)



                                                                                
                                                                                
                            



Surgeries/Procedures

          



             Procedure    Description  Date         Indications  Data Source(s)

 

             INJECTION SINGLE/MLT TRIGGER POINT 3/> MUSCLES              10/11/2

021 12:00:00 AM EDT              

[facility] ([facility], )

 

             Inj, Anesth Agent, Trigeminal              10/11/2021 12:00:00 AM E

DT              [facility] ([facility], )

 

             INJECTION ANES OTHER PERIPHERAL NERVE/BRANCH              10/11/202

1 12:00:00 AM EDT              

[facility] ([facility], )

 

             OFFICE OUTPATIENT VISIT 25 MINUTES              2021 12:00:00

 AM EDT              [facility] ([facility] )

 

             Individual psychotherapy (regime/therapy)              2021 1

2:00:00 AM EDT              

[employer] ([facility])

 

             Individual psychotherapy (regime/therapy)              2021 1

2:00:00 AM EDT              

[facility] ([facility])

 

             Individual psychotherapy (regime/therapy)              2021 1

2:00:00 AM EDT              

[facility] ([facility])

 

             Individual psychotherapy (regime/therapy)              2021 1

2:00:00 AM EDT              

[facility] ([facility])

 

             Individual psychotherapy (regime/therapy)              2021 1

2:00:00 AM EDT              

[facility] ([facility])

 

             Individual psychotherapy (regime/therapy)              2021 1

2:00:00 AM EDT              

[facility] ([facility])

 

                    Evaluation AND/OR management - established patient (procedur

e)                     2021 

12:00:00 AM EDT                                     [facility]

 

                    Evaluation AND/OR management - established patient (procedur

e)                     2021 

12:00:00 AM EDT                                     [facility]

 

                    Evaluation AND/OR management - established patient (procedur

e)                     2021 

12:00:00 AM EDT                                     [facility] ([facility])

 

             Individual psychotherapy (regime/therapy)              2021 1

2:00:00 AM EDT              

[facility] ([facility])

 

             Individual psychotherapy (regime/therapy)              2021 1

2:00:00 AM EDT              

[facility] ([facility])

 

             Individual psychotherapy (regime/therapy)              2021 1

2:00:00 AM EDT              

[facility])

 

             Individual psychotherapy (regime/therapy)              2021 1

2:00:00 AM EDT              

[facility])

 

             Individual psychotherapy (regime/therapy)              2021 1

2:00:00 AM EDT              

[facility] ([facility])

 

             Individual psychotherapy (regime/therapy)              2021 1

2:00:00 AM EDT              

[facility] ([facility])

 

             Individual psychotherapy (regime/therapy)              2021 1

2:00:00 AM EDT              

[employer] ([facility])

 

             Individual psychotherapy (regime/therapy)              2021 1

2:00:00 AM EDT              

[facility] ([facility])

 

             INJECTION SINGLE/MLT TRIGGER POINT 3/> MUSCLES              

021 12:00:00 AM EDT              

[facility] ([facility], )

 

             Inj, Anesth Agent, Trigeminal              2021 12:00:00 AM E

DT              [facility] ([facility], )

 

             INJECTION ANES OTHER PERIPHERAL NERVE/BRANCH               12:00:00 AM EDT              

[facility] ([facility] )

 

             Individual psychotherapy (regime/therapy)              2021 1

2:00:00 AM EDT              

[facility])

 

             Individual psychotherapy (regime/therapy)              2021 1

2:00:00 AM EDT              

[facility] ([facility])

 

             Individual psychotherapy (regime/therapy)              2021 1

2:00:00 AM EDT              

[facility])

 

             Individual psychotherapy (regime/therapy)              2021 1

2:00:00 AM EDT              

[facility] ([facility])

 

             Individual psychotherapy (regime/therapy)              2021 1

2:00:00 AM EDT              

[facility] ([facility])

 

             Individual psychotherapy (regime/therapy)              2021 1

2:00:00 AM EDT              

[facility] ([facility])

 

             Individual psychotherapy (regime/therapy)              2021 1

2:00:00 AM EDT              

[facility])

 

             Individual psychotherapy (regime/therapy)              2021 1

2:00:00 AM EDT              

[facility] ([facility])

 

             Individual psychotherapy (regime/therapy)              2021 1

2:00:00 AM EDT              

[facility] ([facility])

 

             Individual psychotherapy (regime/therapy)              2021 1

2:00:00 AM EDT              

[facility] ([facility])

 

             Individual psychotherapy (regime/therapy)              2021 1

2:00:00 AM EDT              

[facility] ([facility])

 

             Individual psychotherapy (regime/therapy)              2021 1

2:00:00 AM EDT              

[employer] ([facility])

 

             Individual psychotherapy (regime/therapy)              2021 1

2:00:00 AM EDT              

[employer] ([facility])

 

             Individual psychotherapy (regime/therapy)              2021 1

2:00:00 AM EDT              

[employer] ([facility])

 

             Individual psychotherapy (regime/therapy)              2021 1

2:00:00 AM EDT              

[employer] ([facility])

 

             Individual psychotherapy (regime/therapy)              2021 1

2:00:00 AM EDT              

[employer] ([facility])

 

             Individual psychotherapy (regime/therapy)              2021 1

2:00:00 AM EDT              

[employer] ([facility])

 

             Individual psychotherapy (regime/therapy)              2021 1

2:00:00 AM EDT              

[employer] ([facility])

 

             Individual psychotherapy (regime/therapy)              2021 1

2:00:00 AM EDT              

[employer] ([facility])

 

             Individual psychotherapy (regime/therapy)              2021 1

2:00:00 AM EDT              

[employer] ([facility])

 

             Individual psychotherapy (regime/therapy)              2021 1

2:00:00 AM EDT              

[employer] ([facility])

 

             Individual psychotherapy (regime/therapy)              2021 1

2:00:00 AM EDT              

[employer] ([facility])

 

             Individual psychotherapy (regime/therapy)              2021 1

2:00:00 AM EDT              

[employer] ([facility])

 

             Individual psychotherapy (regime/therapy)              2021 1

2:00:00 AM EDT              

[employer] ([facility])

 

             Individual psychotherapy (regime/therapy)              2021 1

2:00:00 AM EDT              

[employer] ([facility])

 

             Individual psychotherapy (regime/therapy)              2021 1

2:00:00 AM EDT              

[employer] ([facility])

 

             Individual psychotherapy (regime/therapy)              2021 1

2:00:00 AM EDT              

[employer] ([facility])

 

             Individual psychotherapy (regime/therapy)              2021 1

2:00:00 AM EDT              

[facility] ([facility])

 

             Individual psychotherapy (regime/therapy)              2021 1

2:00:00 AM EDT              

[facility] ([facility])

 

             Individual psychotherapy (regime/therapy)              2021 1

2:00:00 AM EDT              

[facility] ([facility])

 

             Individual psychotherapy (regime/therapy)              2021 1

2:00:00 AM EDT              

[facility])

 

             Individual psychotherapy (regime/therapy)              2021 1

2:00:00 AM EDT              

[facility] ([facility])

 

             Individual psychotherapy (regime/therapy)              2021 1

2:00:00 AM EDT              

[facility])

 

             Individual psychotherapy (regime/therapy)              2021 1

2:00:00 AM EDT              

[facility])

 

             Individual psychotherapy (regime/therapy)              2021 1

2:00:00 AM EDT              

[facility] ([facility])

 

             Individual psychotherapy (regime/therapy)              2021 1

2:00:00 AM EDT              

[facility])

 

             OFFICE OUTPATIENT VISIT 25 MINUTES              2021 12:00:00

 AM EDT              [facility] ([facility] )

 

             INJECTION SINGLE/MLT TRIGGER POINT 3/> MUSCLES              

021 12:00:00 AM EDT              

[facility] ([facility] )

 

             Inj, Anesth Agent, Trigeminal              2021 12:00:00 AM E

DT              [facility] ([facility])

 

             INJECTION ANES OTHER PERIPHERAL NERVE/BRANCH               12:00:00 AM EDT              

[facility] ([facility] )

 

             Individual psychotherapy (regime/therapy)              2021 1

2:00:00 AM EDT              

[facility])

 

             Individual psychotherapy (regime/therapy)              2021 1

2:00:00 AM EDT              

[facility])

 

             Individual psychotherapy (regime/therapy)              2021 1

2:00:00 AM EDT              

[facility]

 

             Individual psychotherapy (regime/therapy)              2021 1

2:00:00 AM EDT              

[employer] ([facility])

 

             Individual psychotherapy (regime/therapy)              2021 1

2:00:00 AM EDT              

[employer] ([facility])

 

             Individual psychotherapy (regime/therapy)              2021 1

2:00:00 AM EDT              

[facility]

 

             Individual psychotherapy (regime/therapy)              2021 1

2:00:00 AM EDT              

[facility]

 

             Individual psychotherapy (regime/therapy)              2021 1

2:00:00 AM EDT              

[facility]

 

             Individual psychotherapy (regime/therapy)              2021 1

2:00:00 AM EDT              

[facility])

 

             Individual psychotherapy (regime/therapy)              2021 1

2:00:00 AM EDT              

[facility])

 

             Individual psychotherapy (regime/therapy)              2021 1

2:00:00 AM EDT              

[facility] ([facility])

 

             Individual psychotherapy (regime/therapy)              2021 1

2:00:00 AM EDT              

[facility]

 

             Individual psychotherapy (regime/therapy)              2021 1

2:00:00 AM EDT              

[facility])

 

             Individual psychotherapy (regime/therapy)              2021 1

2:00:00 AM EDT              

[facility]

 

             Individual psychotherapy (regime/therapy)              2021 1

2:00:00 AM EDT              

[facility])

 

             Individual psychotherapy (regime/therapy)              2021 1

2:00:00 AM EDT              

[facility]

 

             Individual psychotherapy (regime/therapy)              2021 1

2:00:00 AM EDT              

[facility]

 

             Individual psychotherapy (regime/therapy)              2021 1

2:00:00 AM EDT              

[facility])

 

             Individual psychotherapy (regime/therapy)              2021 1

2:00:00 AM EDT              

[facility])

 

             Individual psychotherapy (regime/therapy)              2021 1

2:00:00 AM EDT              

[facility])

 

             Individual psychotherapy (regime/therapy)              2021 1

2:00:00 AM EDT              

[facility] ([facility])

 

             Individual psychotherapy (regime/therapy)              2021 1

2:00:00 AM EDT              

[facility]

 

             Individual psychotherapy (regime/therapy)              2021 1

2:00:00 AM EDT              

[facility] ([facility])

 

             Individual psychotherapy (regime/therapy)              2021 1

2:00:00 AM EDT              

[facility] ([facility])

 

             Individual psychotherapy (regime/therapy)              2021 1

2:00:00 AM EDT              

[facility] ([facility])

 

             Individual psychotherapy (regime/therapy)              2021 1

2:00:00 AM EDT              

[facility] ([facility])

 

             Individual psychotherapy (regime/therapy)              2021 1

2:00:00 AM EDT              

[facility]

 

             Individual psychotherapy (regime/therapy)              2021 1

2:00:00 AM EDT              

[facility] ([facility])

 

             Individual psychotherapy (regime/therapy)              2021 1

2:00:00 AM EDT              

[facility] ([facility])

 

             Individual psychotherapy (regime/therapy)              2021 1

2:00:00 AM EDT              

[facility] ([facility])

 

                    Evaluation AND/OR management - established patient (procedur

e)                     2021 

12:00:00 AM EDT                                     [facility]

[facility])

 

                    Evaluation AND/OR management - established patient (procedur

e)                     2021 

12:00:00 AM EDT                                     [facility]

[facility] Living Services)

 

                    Evaluation AND/OR management - established patient (procedur

e)                     2021 

12:00:00 AM EDT                                     [facility]

[facility])

 

                    Evaluation AND/OR management - established patient (procedur

e)                     2021 

12:00:00 AM EDT                                     [employer] ([facility]

[facility])

 

                    Evaluation AND/OR management - established patient (procedur

e)                     2021 

12:00:00 AM EDT                                     [employer] ([facility]

[facility])

 

                    Evaluation AND/OR management - established patient (procedur

e)                     2021 

12:00:00 AM EDT                                     [employer] ([facility]

[facility] Living Services)

 

                    Evaluation AND/OR management - established patient (procedur

e)                     2021 

12:00:00 AM EDT                                     [employer] ([facility]

[facility] Living Services)

 

                    Evaluation AND/OR management - established patient (procedur

e)                     2021 

12:00:00 AM EDT                                     [employer] ([facility]

[facility] Living Services)

 

             Individual psychotherapy (regime/therapy)              2021 1

2:00:00 AM EDT              

[facility]

 

             Individual psychotherapy (regime/therapy)              2021 1

2:00:00 AM EDT              

[facility]

 

             Individual psychotherapy (regime/therapy)              2021 1

2:00:00 AM EDT              

[facility]

 

             Individual psychotherapy (regime/therapy)              2021 1

2:00:00 AM EDT              

[facility])

 

             Individual psychotherapy (regime/therapy)              2021 1

2:00:00 AM EDT              

[facility]

 

             Individual psychotherapy (regime/therapy)              2021 1

2:00:00 AM EDT              

[facility]

 

             Individual psychotherapy (regime/therapy)              2021 1

2:00:00 AM EDT              

[employer] ([facility])

 

             Individual psychotherapy (regime/therapy)              2021 1

2:00:00 AM EDT              

[employer] ([facility])

 

             Individual psychotherapy (regime/therapy)              2021 1

2:00:00 AM EDT              

[facility])

 

             Individual psychotherapy (regime/therapy)              2021 1

2:00:00 AM EDT              

[employer] ([facility])

 

             Individual psychotherapy (regime/therapy)              2021 1

2:00:00 AM EDT              

[employer] ([facility])

 

             Individual psychotherapy (regime/therapy)              2021 1

2:00:00 AM EDT              

[facility]

 

             Individual psychotherapy (regime/therapy)              2021 1

2:00:00 AM EDT              

[employer] ([facility])

 

             Individual psychotherapy (regime/therapy)              2021 1

2:00:00 AM EDT              

[facility]

 

             Individual psychotherapy (regime/therapy)              2021 1

2:00:00 AM EDT              

[employer] ([facility])

 

             Individual psychotherapy (regime/therapy)              2021 1

2:00:00 AM EDT              

[facility])

 

             Individual psychotherapy (regime/therapy)              2021 1

2:00:00 AM EDT              

[facility])

 

             Individual psychotherapy (regime/therapy)              2021 1

2:00:00 AM EDT              

[facility]

 

             Individual psychotherapy (regime/therapy)              2021 1

2:00:00 AM EDT              

[facility]

 

             Individual psychotherapy (regime/therapy)              2021 1

2:00:00 AM EDT              

[facility] ([facility])

 

             Individual psychotherapy (regime/therapy)              2021 1

2:00:00 AM EDT              

[facility]

 

             Individual psychotherapy (regime/therapy)              2021 1

2:00:00 AM EDT              

[facility])

 

             Individual psychotherapy (regime/therapy)              2021 1

2:00:00 AM EDT              

[facility] ([facility])

 

             Individual psychotherapy (regime/therapy)              2021 1

2:00:00 AM EDT              

[facility] ([facility])

 

             Individual psychotherapy (regime/therapy)              2021 1

2:00:00 AM EDT              

[facility] ([facility])

 

             Individual psychotherapy (regime/therapy)              2021 1

2:00:00 AM EDT              

[facility])

 

             Individual psychotherapy (regime/therapy)              2021 1

2:00:00 AM EDT              

[facility] ([facility])

 

             Individual psychotherapy (regime/therapy)              2021 1

2:00:00 AM EDT              

[facility] ([facility])

 

             Individual psychotherapy (regime/therapy)              2021 1

2:00:00 AM EDT              

[facility] ([facility])

 

             Individual psychotherapy (regime/therapy)              2021 1

2:00:00 AM EDT              

[facility] ([facility])

 

             Individual psychotherapy (regime/therapy)              2021 1

2:00:00 AM EDT              

[facility])

 

             Individual psychotherapy (regime/therapy)              2021 1

2:00:00 AM EDT              

[facility] ([facility])

 

             Individual psychotherapy (regime/therapy)              2021 1

2:00:00 AM EDT              

[facility] ([facility])

 

             Individual psychotherapy (regime/therapy)              2021 1

2:00:00 AM EDT              

[facility] ([facility])

 

             Individual psychotherapy (regime/therapy)              2021 1

2:00:00 AM EDT              

[facility])

 

             Individual psychotherapy (regime/therapy)              2021 1

2:00:00 AM EDT              

[facility] ([facility])

 

             Individual psychotherapy (regime/therapy)              2021 1

2:00:00 AM EDT              

[facility] ([facility])

 

                    Evaluation AND/OR management - established patient (procedur

e)                     2021 

12:00:00 AM EDT                                     [facility] ([facility]

[facility])

 

             Individual psychotherapy (regime/therapy)              2021 1

2:00:00 AM EDT              

[facility]

 

             Individual psychotherapy (regime/therapy)              2021 1

2:00:00 AM EDT              

[facility] ([facility])

 

                    Evaluation AND/OR management - established patient (procedur

e)                     2021 

12:00:00 AM EDT                                     [facility] ([facility])

 

             Individual psychotherapy (regime/therapy)              2021 1

2:00:00 AM EDT              

[facility] ([facility])

 

             Individual psychotherapy (regime/therapy)              2021 1

2:00:00 AM EDT              

[facility] ([facility])

 

                    Evaluation AND/OR management - established patient (procedur

e)                     2021 

12:00:00 AM EDT                                     [facility])

 

             Individual psychotherapy (regime/therapy)              2021 1

2:00:00 AM EDT              

[facility] ([facility])

 

             Individual psychotherapy (regime/therapy)              2021 1

2:00:00 AM EDT              

[facility] ([facility])

 

                    Evaluation AND/OR management - established patient (procedur

e)                     2021 

12:00:00 AM EDT                                     [facility] ([facility])

 

             Individual psychotherapy (regime/therapy)              2021 1

2:00:00 AM EDT              

[facility] ([facility])

 

             Individual psychotherapy (regime/therapy)              2021 1

2:00:00 AM EDT              

[facility] ([facility])

 

                    Evaluation AND/OR management - established patient (procedur

e)                     2021 

12:00:00 AM EDT                                     [facility] ([facility])

 

             Individual psychotherapy (regime/therapy)              2021 1

2:00:00 AM EDT              

[facility])

 

             Individual psychotherapy (regime/therapy)              2021 1

2:00:00 AM EDT              

[facility])

 

                    Evaluation AND/OR management - established patient (procedur

e)                     2021 

12:00:00 AM EDT                                     [facility] ([facility])

 

             Individual psychotherapy (regime/therapy)              2021 1

2:00:00 AM EDT              

[facility])

 

             Individual psychotherapy (regime/therapy)              2021 1

2:00:00 AM EDT              

[facility] ([facility])

 

                    Evaluation AND/OR management - established patient (procedur

e)                     2021 

12:00:00 AM EDT                                     [facility]

 

             Individual psychotherapy (regime/therapy)              2021 1

2:00:00 AM EDT              

[facility] ([facility])

 

             Individual psychotherapy (regime/therapy)              2021 1

2:00:00 AM EDT              

[facility] ([facility])

 

                    Evaluation AND/OR management - established patient (procedur

e)                     2021 

12:00:00 AM EDT                                     [facility]

 

             Individual psychotherapy (regime/therapy)              2021 1

2:00:00 AM EDT              

[facility] ([facility])

 

             Individual psychotherapy (regime/therapy)              2021 1

2:00:00 AM EDT              

[facility] ([facility])

 

                    Evaluation AND/OR management - established patient (procedur

e)                     2021 

12:00:00 AM EDT                                     [facility] ([facility])

 

             Individual psychotherapy (regime/therapy)              2021 1

2:00:00 AM EDT              

[facility] ([facility])

 

             Individual psychotherapy (regime/therapy)              2021 1

2:00:00 AM EDT              

[facility] ([facility])

 

                    Evaluation AND/OR management - established patient (procedur

e)                     2021 

12:00:00 AM EDT                                     [facility] ([facility])

 

             Individual psychotherapy (regime/therapy)              2021 1

2:00:00 AM EDT              

[facility] ([facility])

 

             Individual psychotherapy (regime/therapy)              2021 1

2:00:00 AM EDT              

[facility] ([facility])

 

             Individual psychotherapy (regime/therapy)              2021 1

2:00:00 AM EDT              

[facility] ([facility])

 

             Individual psychotherapy (regime/therapy)              2021 1

2:00:00 AM EDT              

[facility] ([facility])

 

             Individual psychotherapy (regime/therapy)              2021 1

2:00:00 AM EDT              

[facility] ([facility])

 

             Individual psychotherapy (regime/therapy)              2021 1

2:00:00 AM EDT              

[facility] ([facility])

 

             Individual psychotherapy (regime/therapy)              2021 1

2:00:00 AM EDT              

[facility] ([facility])

 

             Individual psychotherapy (regime/therapy)              2021 1

2:00:00 AM EDT              

[facility] ([facility])

 

             Individual psychotherapy (regime/therapy)              2021 1

2:00:00 AM EDT              

[facility] ([facility])

 

             Individual psychotherapy (regime/therapy)              2021 1

2:00:00 AM EDT              

[facility] ([facility])

 

             Individual psychotherapy (regime/therapy)              2021 1

2:00:00 AM EDT              

[facility] ([facility])

 

             Individual psychotherapy (regime/therapy)              2021 1

2:00:00 AM EDT              

[facility] ([facility])

 

             Individual psychotherapy (regime/therapy)              2021 1

2:00:00 AM EDT              

[facility] ([facility])

 

             Individual psychotherapy (regime/therapy)              2021 1

2:00:00 AM EDT              

[facility] ([facility])

 

             Individual psychotherapy (regime/therapy)              2021 1

2:00:00 AM EDT              

[facility] ([facility])

 

             Individual psychotherapy (regime/therapy)              2021 1

2:00:00 AM EDT              

[facility] ([facility])

 

             Individual psychotherapy (regime/therapy)              2021 1

2:00:00 AM EDT              

[facility] ([facility])

 

             Individual psychotherapy (regime/therapy)              2021 1

2:00:00 AM EDT              

[facility] ([facility])

 

             Individual psychotherapy (regime/therapy)              2021 1

2:00:00 AM EDT              

[facility] ([facility])

 

             Individual psychotherapy (regime/therapy)              2021 1

2:00:00 AM EDT              

[facility] ([facility])

 

             Individual psychotherapy (regime/therapy)              2021 1

2:00:00 AM EDT              

[facility] ([facility])

 

             INJECTION SINGLE/MLT TRIGGER POINT 3/> MUSCLES              

021 12:00:00 AM EDT              

[facility] ([facility] )

 

             Inj, Anesth Agent, Trigeminal              2021 12:00:00 AM E

DT              [facility] ([facility] )

 

             INJECTION ANES OTHER PERIPHERAL NERVE/BRANCH               12:00:00 AM EDT              

[facility] ([facility] )

 

             Individual psychotherapy (regime/therapy)              2021 1

2:00:00 AM EDT              

[facility] ([facility])

 

             Individual psychotherapy (regime/therapy)              2021 1

2:00:00 AM EDT              

[facility] ([facility])

 

             Individual psychotherapy (regime/therapy)              2021 1

2:00:00 AM EDT              

[facility] ([facility])

 

             Individual psychotherapy (regime/therapy)              2021 1

2:00:00 AM EDT              

[facility] ([facility])

 

             Individual psychotherapy (regime/therapy)              2021 1

2:00:00 AM EDT              

[facility] ([facility])

 

             Individual psychotherapy (regime/therapy)              2021 1

2:00:00 AM EDT              

[facility] ([facility])

 

             Individual psychotherapy (regime/therapy)              2021 1

2:00:00 AM EDT              

[facility] ([facility])

 

             Individual psychotherapy (regime/therapy)              2021 1

2:00:00 AM EDT              

[facility] ([facility])

 

             Individual psychotherapy (regime/therapy)              2021 1

2:00:00 AM EDT              

[facility] ([facility])

 

             Individual psychotherapy (regime/therapy)              2021 1

2:00:00 AM EDT              

[facility] ([facility])

 

             Individual psychotherapy (regime/therapy)              2021 1

2:00:00 AM EDT              

[facility] ([facility])

 

             Individual psychotherapy (regime/therapy)              2021 1

2:00:00 AM EDT              

[facility] ([facility])

 

             Individual psychotherapy (regime/therapy)              2021 1

2:00:00 AM EDT              

[facility] ([facility])

 

             Individual psychotherapy (regime/therapy)              2021 1

2:00:00 AM EDT              

[facility] ([facility])

 

             Individual psychotherapy (regime/therapy)              2021 1

2:00:00 AM EDT              

[facility] ([facility])

 

             Individual psychotherapy (regime/therapy)              2021 1

2:00:00 AM EDT              

[facility] ([facility])

 

             Individual psychotherapy (regime/therapy)              2021 1

2:00:00 AM EDT              

[facility] ([facility])

 

             Individual psychotherapy (regime/therapy)              2021 1

2:00:00 AM EDT              

[facility] ([facility])

 

             Individual psychotherapy (regime/therapy)              2021 1

2:00:00 AM EDT              

[facility] ([facility])

 

             Individual psychotherapy (regime/therapy)              2021 1

2:00:00 AM EDT              

[facility] ([facility])

 

             OFFICE OUTPATIENT VISIT 15 MINUTES              2021 12:00:00

 AM EDT              [facility] ([facility])

 

                    Evaluation AND/OR management - established patient (procedur

e)                     2021 

12:00:00 AM EDT                                     [facility] ([facility])

 

                    Evaluation AND/OR management - established patient (procedur

e)                     2021 

12:00:00 AM EDT                                     [facility] ([facility]

[facility])

 

                    Evaluation AND/OR management - established patient (procedur

e)                     2021 

12:00:00 AM EDT                                     [facility] ([facility]

[facility])

 

                    Evaluation AND/OR management - established patient (procedur

e)                     2021 

12:00:00 AM EDT                                     [facility] ([facility]

[facility])

 

                    Evaluation AND/OR management - established patient (procedur

e)                     2021 

12:00:00 AM EDT                                     [facility]

 

                    Evaluation AND/OR management - established patient (procedur

e)                     2021 

12:00:00 AM EDT                                     [facility])

 

                    Evaluation AND/OR management - established patient (procedur

e)                     2021 

12:00:00 AM EDT                                     [facility] ([facility]

[facility])

 

                    Evaluation AND/OR management - established patient (procedur

e)                     2021 

12:00:00 AM EDT                                     [facility] ([facility]

[facility])

 

                    Evaluation AND/OR management - established patient (procedur

e)                     2021 

12:00:00 AM EDT                                     [facility] ([facility]

[facility])

 

                    Evaluation AND/OR management - established patient (procedur

e)                     2021 

12:00:00 AM EDT                                     [facility] ([facility]

[facility])

 

             Individual psychotherapy (regime/therapy)              2021 1

2:00:00 AM EDT              

[facility] ([facility])

 

             Individual psychotherapy (regime/therapy)              2021 1

2:00:00 AM EDT              

[facility] ([facility])

 

             Individual psychotherapy (regime/therapy)              2021 1

2:00:00 AM EDT              

[facility] ([facility])

 

             Individual psychotherapy (regime/therapy)              2021 1

2:00:00 AM EDT              

[facility] ([facility])

 

             Individual psychotherapy (regime/therapy)              2021 1

2:00:00 AM EDT              

[facility] ([facility])

 

             Individual psychotherapy (regime/therapy)              2021 1

2:00:00 AM EDT              

[facility] ([facility])

 

             Individual psychotherapy (regime/therapy)              2021 1

2:00:00 AM EDT              

[facility] ([facility])

 

             Individual psychotherapy (regime/therapy)              2021 1

2:00:00 AM EDT              

[facility] ([facility])

 

             Individual psychotherapy (regime/therapy)              2021 1

2:00:00 AM EDT              

[facility] ([facility])

 

             Individual psychotherapy (regime/therapy)              2021 1

2:00:00 AM EDT              

[facility] ([facility])

 

             Individual psychotherapy (regime/therapy)              2021 1

2:00:00 AM EDT              

[facility])

 

             Individual psychotherapy (regime/therapy)              2021 1

2:00:00 AM EDT              

[facility])

 

             Individual psychotherapy (regime/therapy)              2021 1

2:00:00 AM EDT              

[facility] ([facility])

 

             Individual psychotherapy (regime/therapy)              2021 1

2:00:00 AM EDT              

[facility])

 

             Individual psychotherapy (regime/therapy)              2021 1

2:00:00 AM EDT              

[facility])

 

             Individual psychotherapy (regime/therapy)              2021 1

2:00:00 AM EDT              

[facility] ([facility])

 

             Individual psychotherapy (regime/therapy)              2021 1

2:00:00 AM EDT              

[facility])

 

             Individual psychotherapy (regime/therapy)              2021 1

2:00:00 AM EDT              

[facility])

 

             Individual psychotherapy (regime/therapy)              2021 1

2:00:00 AM EDT              

[facility] ([facility])

 

             Individual psychotherapy (regime/therapy)              2021 1

2:00:00 AM EDT              

[facility])

 

             INJECTION SINGLE/MLT TRIGGER POINT 3/> MUSCLES              

021 12:00:00 AM EST              

[facility] ([facility])

 

             Inj, Anesth Agent, Trigeminal              2021 12:00:00 AM E

ST              [facility] ([facility])

 

             INJECTION ANES OTHER PERIPHERAL NERVE/BRANCH               12:00:00 AM EST              

[facility] ([facility])

 

                    Evaluation AND/OR management - established patient (procedur

e)                     02/15/2021 

12:00:00 AM EST                                     [facility] ([facility])

 

                    Evaluation AND/OR management - established patient (procedur

e)                     02/15/2021 

12:00:00 AM EST                                     [facility] ([facility])

 

                    Evaluation AND/OR management - established patient (procedur

e)                     02/15/2021 

12:00:00 AM EST                                     [facility]

 

                    Evaluation AND/OR management - established patient (procedur

e)                     02/15/2021 

12:00:00 AM EST                                     [facility] ([facility]

[facility])

 

                    Evaluation AND/OR management - established patient (procedur

e)                     02/15/2021 

12:00:00 AM EST                                     [facility] ([facility]

[facility] Living [facility])

 

                    Evaluation AND/OR management - established patient (procedur

e)                     02/15/2021 

12:00:00 AM EST                                     [facility] ([facility]

[facility])

 

                    Evaluation AND/OR management - established patient (procedur

e)                     02/15/2021 

12:00:00 AM EST                                     [facility] ([facility]

ns[facility] Living [facility])

 

                    Evaluation AND/OR management - established patient (procedur

e)                     02/15/2021 

12:00:00 AM EST                                     [facility] ([facility]

[facility])

 

                    Evaluation AND/OR management - established patient (procedur

e)                     02/15/2021 

12:00:00 AM EST                                     [facility] ([facility]

[facility])

 

                    Evaluation AND/OR management - established patient (procedur

e)                     02/15/2021 

12:00:00 AM EST                                     [facility] ([facility]

[facility])

 

             Individual psychotherapy (regime/therapy)              02/10/2021 1

2:00:00 AM EST              

[facility] ([facility])

 

             Individual psychotherapy (regime/therapy)              02/10/2021 1

2:00:00 AM EST              

[facility] ([facility])

 

             Individual psychotherapy (regime/therapy)              02/10/2021 1

2:00:00 AM EST              

[facility] ([facility])

 

             Individual psychotherapy (regime/therapy)              02/10/2021 1

2:00:00 AM EST              

[facility] ([facility])

 

             Individual psychotherapy (regime/therapy)              02/10/2021 1

2:00:00 AM EST              

[facility] ([facility])

 

             Individual psychotherapy (regime/therapy)              02/10/2021 1

2:00:00 AM EST              

[facility] ([facility])

 

             Individual psychotherapy (regime/therapy)              02/10/2021 1

2:00:00 AM EST              

[facility] ([facility])

 

             Individual psychotherapy (regime/therapy)              02/10/2021 1

2:00:00 AM EST              

[facility] ([facility])

 

             Individual psychotherapy (regime/therapy)              02/10/2021 1

2:00:00 AM EST              

[facility] ([facility])

 

             Individual psychotherapy (regime/therapy)              02/10/2021 1

2:00:00 AM EST              

[facility] ([facility])

 

             INJECTION SINGLE/MLT TRIGGER POINT 3/> MUSCLES              

021 12:00:00 AM EST              

[facility] ([facility])

 

             Inj, Anesth Agent, Trigeminal              2021 12:00:00 AM E

ST              [facility] ([facility] )

 

             INJECTION ANES OTHER PERIPHERAL NERVE/BRANCH               12:00:00 AM EST              

[facility] ([facility] )

 

             Individual psychotherapy (regime/therapy)              2021 1

2:00:00 AM EST              

[facility] ([facility])

 

             Individual psychotherapy (regime/therapy)              2021 1

2:00:00 AM EST              

[facility] ([facility])

 

             Individual psychotherapy (regime/therapy)              2021 1

2:00:00 AM EST              

[facility] ([facility])

 

             Individual psychotherapy (regime/therapy)              2021 1

2:00:00 AM EST              

[facility] ([facility])

 

             Individual psychotherapy (regime/therapy)              2021 1

2:00:00 AM EST              

[facility] ([facility])

 

             Individual psychotherapy (regime/therapy)              2021 1

2:00:00 AM EST              

[facility] ([facility])

 

             Individual psychotherapy (regime/therapy)              2021 1

2:00:00 AM EST              

[facility] ([facility])

 

             Individual psychotherapy (regime/therapy)              2021 1

2:00:00 AM EST              

[facility] ([facility])

 

             Individual psychotherapy (regime/therapy)              2021 1

2:00:00 AM EST              

[facility] ([facility])

 

             Individual psychotherapy (regime/therapy)              2021 1

2:00:00 AM EST              

[facility] ([facility])

 

             Individual psychotherapy (regime/therapy)              2021 1

2:00:00 AM EST              

[facility] ([facility])

 

             Individual psychotherapy (regime/therapy)              2021 1

2:00:00 AM EST              

[facility] ([facility])

 

             Individual psychotherapy (regime/therapy)              2021 1

2:00:00 AM EST              

[facility] ([facility])

 

             Individual psychotherapy (regime/therapy)              2021 1

2:00:00 AM EST              

[facility] ([facility])

 

             Individual psychotherapy (regime/therapy)              2021 1

2:00:00 AM EST              

[facility] ([facility])

 

             Individual psychotherapy (regime/therapy)              2021 1

2:00:00 AM EST              

[facility] ([facility])

 

             Individual psychotherapy (regime/therapy)              2021 1

2:00:00 AM EST              

[facility] ([facility])

 

             Individual psychotherapy (regime/therapy)              2021 1

2:00:00 AM EST              

[facility] ([facility])

 

             Individual psychotherapy (regime/therapy)              2021 1

2:00:00 AM EST              

[facility] ([facility])

 

             Individual psychotherapy (regime/therapy)              2021 1

2:00:00 AM EST              

[facility] ([facility])

 

             Individual psychotherapy (regime/therapy)              2021 1

2:00:00 AM EST              

[facility] ([facility])

 

             Individual psychotherapy (regime/therapy)              2021 1

2:00:00 AM EST              

[facility] ([facility])

 

             Individual psychotherapy (regime/therapy)              2021 1

2:00:00 AM EST              

[facility] ([facility])

 

             Individual psychotherapy (regime/therapy)              2021 1

2:00:00 AM EST              

[facility] ([facility])

 

             Individual psychotherapy (regime/therapy)              2021 1

2:00:00 AM EST              

[facility] ([facility])

 

             Individual psychotherapy (regime/therapy)              2021 1

2:00:00 AM EST              

[facility] ([facility])

 

             Individual psychotherapy (regime/therapy)              2021 1

2:00:00 AM EST              

[facility] ([facility])

 

             Individual psychotherapy (regime/therapy)              2021 1

2:00:00 AM EST              

[facility] ([facility])

 

             Individual psychotherapy (regime/therapy)              2021 1

2:00:00 AM EST              

[facility] ([facility])

 

             Individual psychotherapy (regime/therapy)              2021 1

2:00:00 AM EST              

[facility] ([facility])

 

                    Evaluation AND/OR management - established patient (procedur

e)                     2021 

12:00:00 AM EST                                     [facility] ([facility]

[facility])

 

                    Evaluation AND/OR management - established patient (procedur

e)                     2021 

12:00:00 AM EST                                     [facility] ([facility]

[facility])

 

                    Evaluation AND/OR management - established patient (procedur

e)                     2021 

12:00:00 AM EST                                     [facility] ([facility]

[facility])

 

                    Evaluation AND/OR management - established patient (procedur

e)                     2021 

12:00:00 AM EST                                     [facility] ([facility]

[facility])

 

                    Evaluation AND/OR management - established patient (procedur

e)                     2021 

12:00:00 AM EST                                     [facility] ([facility]

[facility])

 

                    Evaluation AND/OR management - established patient (procedur

e)                     2021 

12:00:00 AM EST                                     [facility] ([facility]

[facility])

 

                    Evaluation AND/OR management - established patient (procedur

e)                     2021 

12:00:00 AM EST                                     [facility] ([facility]

[facility])

 

                    Evaluation AND/OR management - established patient (procedur

e)                     2021 

12:00:00 AM EST                                     [facility] ([facility]

[facility])

 

                    Evaluation AND/OR management - established patient (procedur

e)                     2021 

12:00:00 AM EST                                     [facility] ([facility]

[facility])

 

                    Evaluation AND/OR management - established patient (procedur

e)                     2021 

12:00:00 AM EST                                     [facility] ([facility]

[facility])

 

             Individual psychotherapy (regime/therapy)              2020 1

2:00:00 AM EST              

[facility] ([facility])

 

             Individual psychotherapy (regime/therapy)              2020 1

2:00:00 AM EST              

[facility] ([facility])

 

             Individual psychotherapy (regime/therapy)              2020 1

2:00:00 AM EST              

[facility] ([facility])

 

             Individual psychotherapy (regime/therapy)              2020 1

2:00:00 AM EST              

[facility] ([facility])

 

             Individual psychotherapy (regime/therapy)              2020 1

2:00:00 AM EST              

[facility] ([facility])

 

             Individual psychotherapy (regime/therapy)              2020 1

2:00:00 AM EST              

[facility] ([facility])

 

             Individual psychotherapy (regime/therapy)              2020 1

2:00:00 AM EST              

[facility] ([facility])

 

             Individual psychotherapy (regime/therapy)              2020 1

2:00:00 AM EST              

[facility] ([facility])

 

             Individual psychotherapy (regime/therapy)              2020 1

2:00:00 AM EST              

[facility] ([facility])

 

             Individual psychotherapy (regime/therapy)              2020 1

2:00:00 AM EST              

[facility] ([facility])

 

             Individual psychotherapy (regime/therapy)              2020 1

2:00:00 AM EST              

[facility] ([facility])

 

             Individual psychotherapy (regime/therapy)              2020 1

2:00:00 AM EST              

[facility] ([facility])

 

             Individual psychotherapy (regime/therapy)              2020 1

2:00:00 AM EST              

[facility] ([facility])

 

             Individual psychotherapy (regime/therapy)              2020 1

2:00:00 AM EST              

[facility] ([facility])

 

             Individual psychotherapy (regime/therapy)              2020 1

2:00:00 AM EST              

[facility] ([facility])

 

             Individual psychotherapy (regime/therapy)              2020 1

2:00:00 AM EST              

[facility] ([facility])

 

             Individual psychotherapy (regime/therapy)              2020 1

2:00:00 AM EST              

[facility] ([facility])

 

             Individual psychotherapy (regime/therapy)              2020 1

2:00:00 AM EST              

[facility] ([facility])

 

             Individual psychotherapy (regime/therapy)              2020 1

2:00:00 AM EST              

[facility] ([facility])

 

             Individual psychotherapy (regime/therapy)              2020 1

2:00:00 AM EST              

[facility] ([facility])

 

             Individual psychotherapy (regime/therapy)              2020 1

2:00:00 AM EST              

[facility] ([facility])

 

             Individual psychotherapy (regime/therapy)              2020 1

2:00:00 AM EST              

[facility] ([facility])

 

             Individual psychotherapy (regime/therapy)              2020 1

2:00:00 AM EST              

[facility] ([facility])

 

             Individual psychotherapy (regime/therapy)              2020 1

2:00:00 AM EST              

[facility] ([facility])

 

             Individual psychotherapy (regime/therapy)              2020 1

2:00:00 AM EST              

[facility] ([facility])

 

             Individual psychotherapy (regime/therapy)              2020 1

2:00:00 AM EST              

[facility] ([facility])

 

             Individual psychotherapy (regime/therapy)              2020 1

2:00:00 AM EST              

[facility] ([facility])

 

             Individual psychotherapy (regime/therapy)              2020 1

2:00:00 AM EST              

[facility] ([facility])

 

             Individual psychotherapy (regime/therapy)              2020 1

2:00:00 AM EST              

[facility] ([facility])

 

             Individual psychotherapy (regime/therapy)              2020 1

2:00:00 AM EST              

[facility] ([facility])

 

             Individual psychotherapy (regime/therapy)              2020 1

2:00:00 AM EST              

[facility] ([facility])

 

             Individual psychotherapy (regime/therapy)              2020 1

2:00:00 AM EST              

[facility] ([facility])

 

             Individual psychotherapy (regime/therapy)              2020 1

2:00:00 AM EST              

[facility] ([facility])

 

             Individual psychotherapy (regime/therapy)              2020 1

2:00:00 AM EST              

[facility] ([facility])

 

             Individual psychotherapy (regime/therapy)              2020 1

2:00:00 AM EST              

[facility] ([facility])

 

             Individual psychotherapy (regime/therapy)              2020 1

2:00:00 AM EST              

[facility] ([facility])

 

             Individual psychotherapy (regime/therapy)              2020 1

2:00:00 AM EST              

[facility] ([facility])

 

             Individual psychotherapy (regime/therapy)              2020 1

2:00:00 AM EST              

[facility] ([facility])

 

             Individual psychotherapy (regime/therapy)              2020 1

2:00:00 AM EST              

[facility] ([facility])

 

             Individual psychotherapy (regime/therapy)              2020 1

2:00:00 AM EST              

[facility])

 

             INJECTION SINGLE/MLT TRIGGER POINT 3/> MUSCLES              

020 12:00:00 AM EST              

[facility] ([facility])

 

             Inj, Anesth Agent, Trigeminal              2020 12:00:00 AM E

ST              [facility] ([facility])

 

             INJECTION ANES OTHER PERIPHERAL NERVE/BRANCH              

0 12:00:00 AM EST              

[facility] ([facility])

 

             Individual psychotherapy (regime/therapy)              2020 1

2:00:00 AM EST              

[facility])

 

             Individual psychotherapy (regime/therapy)              2020 1

2:00:00 AM EST              

[facility] ([facility])

 

             Individual psychotherapy (regime/therapy)              2020 1

2:00:00 AM EST              

[facility])

 

             Individual psychotherapy (regime/therapy)              2020 1

2:00:00 AM EST              

[facility])

 

             Individual psychotherapy (regime/therapy)              2020 1

2:00:00 AM EST              

[facility])

 

             Individual psychotherapy (regime/therapy)              2020 1

2:00:00 AM EST              

[facility])

 

             Individual psychotherapy (regime/therapy)              2020 1

2:00:00 AM EST              

[facility])

 

             Individual psychotherapy (regime/therapy)              2020 1

2:00:00 AM EST              

[facility] ([facility])

 

             Individual psychotherapy (regime/therapy)              2020 1

2:00:00 AM EST              

[facility] ([facility])

 

             Individual psychotherapy (regime/therapy)              2020 1

2:00:00 AM EST              

[facility] ([facility])

 

             Individual psychotherapy (regime/therapy)              2020 1

2:00:00 AM EST              

[facility] ([facility])

 

             Individual psychotherapy (regime/therapy)              2020 1

2:00:00 AM EST              

[facility] ([facility])

 

             Individual psychotherapy (regime/therapy)              2020 1

2:00:00 AM EST              

[facility] ([facility])

 

             Individual psychotherapy (regime/therapy)              2020 1

2:00:00 AM EST              

[facility] ([facility])

 

             Individual psychotherapy (regime/therapy)              2020 1

2:00:00 AM EST              

[facility] ([facility])

 

             Individual psychotherapy (regime/therapy)              2020 1

2:00:00 AM EST              

[facility] ([facility])

 

             Individual psychotherapy (regime/therapy)              2020 1

2:00:00 AM EST              

[facility] ([facility])

 

             Individual psychotherapy (regime/therapy)              2020 1

2:00:00 AM EST              

[facility] ([facility])

 

             Individual psychotherapy (regime/therapy)              2020 1

2:00:00 AM EST              

[facility] ([facility])

 

             Individual psychotherapy (regime/therapy)              2020 1

2:00:00 AM EST              

[facility] ([facility])

 

             Individual psychotherapy (regime/therapy)              2020 1

2:00:00 AM EST              

[facility] ([facility])

 

             Individual psychotherapy (regime/therapy)              2020 1

2:00:00 AM EST              

[facility] ([facility])

 

             Individual psychotherapy (regime/therapy)              2020 1

2:00:00 AM EST              

[facility] ([facility])

 

             Individual psychotherapy (regime/therapy)              2020 1

2:00:00 AM EST              

[facility] ([facility])

 

             Individual psychotherapy (regime/therapy)              2020 1

2:00:00 AM EST              

[facility] ([facility])

 

             Individual psychotherapy (regime/therapy)              2020 1

2:00:00 AM EST              

[facility] ([facility])

 

             Individual psychotherapy (regime/therapy)              2020 1

2:00:00 AM EST              

[facility] ([facility])

 

             Individual psychotherapy (regime/therapy)              2020 1

2:00:00 AM EST              

[facility] ([facility])

 

             Individual psychotherapy (regime/therapy)              2020 1

2:00:00 AM EST              

[facility] ([facility])

 

             Individual psychotherapy (regime/therapy)              2020 1

2:00:00 AM EST              

[facility] ([facility])

 

             Individual psychotherapy (regime/therapy)              2020 1

2:00:00 AM EST              

[facility] ([facility])

 

             Individual psychotherapy (regime/therapy)              2020 1

2:00:00 AM EST              

[facility] ([facility])

 

             Individual psychotherapy (regime/therapy)              2020 1

2:00:00 AM EST              

[facility] ([facility])

 

             Individual psychotherapy (regime/therapy)              2020 1

2:00:00 AM EST              

[facility] ([facility])

 

             Individual psychotherapy (regime/therapy)              2020 1

2:00:00 AM EST              

[facility] ([facility])

 

             Individual psychotherapy (regime/therapy)              2020 1

2:00:00 AM EST              

[facility] ([facility])

 

             Individual psychotherapy (regime/therapy)              2020 1

2:00:00 AM EST              

[facility] ([facility])

 

             Individual psychotherapy (regime/therapy)              2020 1

2:00:00 AM EST              

[employer] ([facility])

 

             Individual psychotherapy (regime/therapy)              2020 1

2:00:00 AM EST              

[employer] ([facility])

 

             Individual psychotherapy (regime/therapy)              2020 1

2:00:00 AM EST              

[employer] ([facility])

 

                    Evaluation AND/OR management - established patient (procedur

e)                     2020 

12:00:00 AM EST                                     [employer] ([facility])

 

                    Evaluation AND/OR management - established patient (procedur

e)                     2020 

12:00:00 AM EST                                     [employer] ([facility])

 

                    Evaluation AND/OR management - established patient (procedur

e)                     2020 

12:00:00 AM EST                                     [employer] ([facility])

 

                    Evaluation AND/OR management - established patient (procedur

e)                     2020 

12:00:00 AM EST                                     [employer] ([facility])

 

                    Evaluation AND/OR management - established patient (procedur

e)                     2020 

12:00:00 AM EST                                     [employer] ([facility])

 

                    Evaluation AND/OR management - established patient (procedur

e)                     2020 

12:00:00 AM EST                                     [employer] ([facility])

 

                    Evaluation AND/OR management - established patient (procedur

e)                     2020 

12:00:00 AM EST                                     [employer] ([facility])

 

                    Evaluation AND/OR management - established patient (procedur

e)                     2020 

12:00:00 AM EST                                     [employer] ([facility])

 

                    Evaluation AND/OR management - established patient (procedur

e)                     2020 

12:00:00 AM EST                                     [employer] ([facility])

 

                    Evaluation AND/OR management - established patient (procedur

e)                     2020 

12:00:00 AM EST                                     [facility])

 

             INJECTION SINGLE/MLT TRIGGER POINT 3/> MUSCLES              

020 12:00:00 AM EST              

[facility] ([facility])

 

             Inj, Anesth Agent, Trigeminal              2020 12:00:00 AM E

ST              [facility] ([facility])

 

             INJECTION ANES OTHER PERIPHERAL NERVE/BRANCH              

0 12:00:00 AM EST              

[facility] ([facility])

 

             Individual psychotherapy (regime/therapy)              10/29/2020 1

2:00:00 AM EDT              

[facility])

 

             Individual psychotherapy (regime/therapy)              10/29/2020 1

2:00:00 AM EDT              

[employer] ([facility])

 

             Individual psychotherapy (regime/therapy)              10/29/2020 1

2:00:00 AM EDT              

[employer] ([facility])

 

             Individual psychotherapy (regime/therapy)              10/29/2020 1

2:00:00 AM EDT              

[employer] ([facility])

 

             Individual psychotherapy (regime/therapy)              10/29/2020 1

2:00:00 AM EDT              

[employer] ([facility])

 

             Individual psychotherapy (regime/therapy)              10/29/2020 1

2:00:00 AM EDT              

[employer] ([facility])

 

             Individual psychotherapy (regime/therapy)              10/29/2020 1

2:00:00 AM EDT              

[employer] ([facility])

 

             Individual psychotherapy (regime/therapy)              10/29/2020 1

2:00:00 AM EDT              

[employer] ([facility])

 

             Individual psychotherapy (regime/therapy)              10/29/2020 1

2:00:00 AM EDT              

[facility]

 

             Individual psychotherapy (regime/therapy)              10/29/2020 1

2:00:00 AM EDT              

[employer] ([facility])

 

             Individual psychotherapy (regime/therapy)              10/29/2020 1

2:00:00 AM EDT              

[employer] ([facility])

 

             Individual psychotherapy (regime/therapy)              10/29/2020 1

2:00:00 AM EDT              

[employer] ([facility])

 

             Individual psychotherapy (regime/therapy)              10/29/2020 1

2:00:00 AM EDT              

[facility]

 

             Individual psychotherapy (regime/therapy)              10/29/2020 1

2:00:00 AM EDT              

[employer] ([facility])

 

             Individual psychotherapy (regime/therapy)              10/29/2020 1

2:00:00 AM EDT              

[employer] ([facility])

 

             Individual psychotherapy (regime/therapy)              10/29/2020 1

2:00:00 AM EDT              

[employer] ([facility])

 

             Individual psychotherapy (regime/therapy)              10/29/2020 1

2:00:00 AM EDT              

[employer] ([facility])

 

             Individual psychotherapy (regime/therapy)              10/29/2020 1

2:00:00 AM EDT              

[facility]

 

             Individual psychotherapy (regime/therapy)              10/29/2020 1

2:00:00 AM EDT              

[facility] ([facility])

 

             Individual psychotherapy (regime/therapy)              10/29/2020 1

2:00:00 AM EDT              

[facility])

 

             INJECTION SINGLE/MLT TRIGGER POINT 3/> MUSCLES              10/13/2

020 12:00:00 AM EDT              

[facility] ([facility] )

 

             Inj, Anesth Agent, Trigeminal              10/13/2020 12:00:00 AM E

DT              [facility] ([facility] )

 

             INJECTION ANES OTHER PERIPHERAL NERVE/BRANCH              10/13/202

0 12:00:00 AM EDT              

[facility] ([facility] )

 

             Individual psychotherapy (regime/therapy)              10/08/2020 1

2:00:00 AM EDT              

[facility])

 

             Individual psychotherapy (regime/therapy)              10/08/2020 1

2:00:00 AM EDT              

[facility])

 

             Individual psychotherapy (regime/therapy)              10/08/2020 1

2:00:00 AM EDT              

[facility])

 

             Individual psychotherapy (regime/therapy)              10/08/2020 1

2:00:00 AM EDT              

[facility])

 

             Individual psychotherapy (regime/therapy)              10/08/2020 1

2:00:00 AM EDT              

[facility])

 

             Individual psychotherapy (regime/therapy)              10/08/2020 1

2:00:00 AM EDT              

[facility] ([facility])

 

             Individual psychotherapy (regime/therapy)              10/08/2020 1

2:00:00 AM EDT              

[facility])

 

             Individual psychotherapy (regime/therapy)              10/08/2020 1

2:00:00 AM EDT              

[facility] ([facility])

 

             Individual psychotherapy (regime/therapy)              10/08/2020 1

2:00:00 AM EDT              

[facility] ([facility])

 

             Individual psychotherapy (regime/therapy)              10/08/2020 1

2:00:00 AM EDT              

[facility])

 

             Individual psychotherapy (regime/therapy)              10/08/2020 1

2:00:00 AM EDT              

[facility])

 

             Individual psychotherapy (regime/therapy)              10/08/2020 1

2:00:00 AM EDT              

[facility] ([facility])

 

             Individual psychotherapy (regime/therapy)              10/08/2020 1

2:00:00 AM EDT              

[facility] ([facility])

 

             Individual psychotherapy (regime/therapy)              10/08/2020 1

2:00:00 AM EDT              

[facility] ([facility])

 

             Individual psychotherapy (regime/therapy)              10/08/2020 1

2:00:00 AM EDT              

[facility] ([facility])

 

             Individual psychotherapy (regime/therapy)              10/08/2020 1

2:00:00 AM EDT              

[facility] ([facility])

 

             Individual psychotherapy (regime/therapy)              10/08/2020 1

2:00:00 AM EDT              

[facility] ([facility])

 

             Individual psychotherapy (regime/therapy)              10/08/2020 1

2:00:00 AM EDT              

[facility] ([facility])

 

             Individual psychotherapy (regime/therapy)              10/08/2020 1

2:00:00 AM EDT              

[facility] ([facility])

 

             Individual psychotherapy (regime/therapy)              10/08/2020 1

2:00:00 AM EDT              

[facility] ([facility])

 

             Individual psychotherapy (regime/therapy)              2020 1

2:00:00 AM EDT              

[facility] ([facility])

 

                    Evaluation AND/OR management - established patient (procedur

e)                     2020 

12:00:00 AM EDT                                     [facility] ([facility])

 

             Individual psychotherapy (regime/therapy)              2020 1

2:00:00 AM EDT              

[facility] ([facility])

 

                    Evaluation AND/OR management - established patient (procedur

e)                     2020 

12:00:00 AM EDT                                     [facility] ([facility])

 

             Individual psychotherapy (regime/therapy)              2020 1

2:00:00 AM EDT              

[facility] ([facility])

 

                    Evaluation AND/OR management - established patient (procedur

e)                     2020 

12:00:00 AM EDT                                     [facility] ([facility])

 

             Individual psychotherapy (regime/therapy)              2020 1

2:00:00 AM EDT              

[facility] ([facility])

 

                    Evaluation AND/OR management - established patient (procedur

e)                     2020 

12:00:00 AM EDT                                     [facility] ([facility])

 

             Individual psychotherapy (regime/therapy)              2020 1

2:00:00 AM EDT              

[facility] ([facility])

 

                    Evaluation AND/OR management - established patient (procedur

e)                     2020 

12:00:00 AM EDT                                     [facility] ([facility])

 

             Individual psychotherapy (regime/therapy)              2020 1

2:00:00 AM EDT              

[facility] ([facility])

 

                    Evaluation AND/OR management - established patient (procedur

e)                     2020 

12:00:00 AM EDT                                     [facility] ([facility])

 

             Individual psychotherapy (regime/therapy)              2020 1

2:00:00 AM EDT              

[facility] ([facility])

 

                    Evaluation AND/OR management - established patient (procedur

e)                     2020 

12:00:00 AM EDT                                     [facility] ([facility])

 

             Individual psychotherapy (regime/therapy)              2020 1

2:00:00 AM EDT              

[facility] ([facility])

 

                    Evaluation AND/OR management - established patient (procedur

e)                     2020 

12:00:00 AM EDT                                     [facility] ([facility])

 

             Individual psychotherapy (regime/therapy)              2020 1

2:00:00 AM EDT              

[facility] ([facility])

 

                    Evaluation AND/OR management - established patient (procedur

e)                     2020 

12:00:00 AM EDT                                     [facility] ([facility])

 

             Individual psychotherapy (regime/therapy)              2020 1

2:00:00 AM EDT              

[facility] ([facility])

 

                    Evaluation AND/OR management - established patient (procedur

e)                     2020 

12:00:00 AM EDT                                     [facility] ([facility])

 

             INJECTION SINGLE/MLT TRIGGER POINT 3/> MUSCLES              

020 12:00:00 AM EDT              

[facility] ([facility])

 

             Inj, Anesth Agent, Trigeminal              2020 12:00:00 AM E

DT              [facility] ([facility])

 

             INJECTION ANES OTHER PERIPHERAL NERVE/BRANCH              

0 12:00:00 AM EDT              

[facility] ([facility])

 

             Individual psychotherapy (regime/therapy)              2020 1

2:00:00 AM EDT              

[facility] ([facility])

 

             Individual psychotherapy (regime/therapy)              2020 1

2:00:00 AM EDT              

[facility] ([facility])

 

             Individual psychotherapy (regime/therapy)              2020 1

2:00:00 AM EDT              

[facility] ([facility])

 

             Individual psychotherapy (regime/therapy)              2020 1

2:00:00 AM EDT              

[facility] ([facility])

 

             Individual psychotherapy (regime/therapy)              2020 1

2:00:00 AM EDT              

[facility] ([facility])

 

             Individual psychotherapy (regime/therapy)              2020 1

2:00:00 AM EDT              

[facility] ([facility])

 

             Individual psychotherapy (regime/therapy)              2020 1

2:00:00 AM EDT              

[facility]

 

             Individual psychotherapy (regime/therapy)              2020 1

2:00:00 AM EDT              

[facility]

 

             Individual psychotherapy (regime/therapy)              2020 1

2:00:00 AM EDT              

[facility])

 

             Individual psychotherapy (regime/therapy)              2020 1

2:00:00 AM EDT              

[facility] ([facility])

 

             Individual psychotherapy (regime/therapy)              2020 1

2:00:00 AM EDT              

[facility] ([facility])

 

             Individual psychotherapy (regime/therapy)              2020 1

2:00:00 AM EDT              

[facility]

 

             Individual psychotherapy (regime/therapy)              2020 1

2:00:00 AM EDT              

[facility] ([facility])

 

             Individual psychotherapy (regime/therapy)              2020 1

2:00:00 AM EDT              

[facility] ([facility])

 

             Individual psychotherapy (regime/therapy)              2020 1

2:00:00 AM EDT              

[facility] ([facility])

 

             Individual psychotherapy (regime/therapy)              2020 1

2:00:00 AM EDT              

[facility] ([facility])

 

             Individual psychotherapy (regime/therapy)              2020 1

2:00:00 AM EDT              

[facility] ([facility])

 

             Individual psychotherapy (regime/therapy)              2020 1

2:00:00 AM EDT              

[facility])

 

             Individual psychotherapy (regime/therapy)              2020 1

2:00:00 AM EDT              

[facility] ([facility])

 

             Individual psychotherapy (regime/therapy)              2020 1

2:00:00 AM EDT              

[facility])



                                                                                
                                                                                
                                                                                
                                                                                
                                                                                
                                                                                
                                                                                
                                                                                
                                                                                
                                                                                
                                                                                
                                                                                
                                                                                
                                                                                
                                                                                
                                                                                
                                                                                
                                                                                
                                                                                
                                                                                
                                                                                
                                                                                
                                                                                
                                                                                
                                                                                
                                                                                
                                                                                
                                                                                
                                                                                
                                                                                
                                                                                
                                                                                
                                                                                
                                                                                
                                                                                
                                                                                
                                                                                
                                                                                
                                                                                
                                                                                
                                                                                
                                                                                
                                                                                
                                                                                
                                                                                
                                                                                
                                                                                
                                                                                
                                                                                
                                                                                
                                                                                
                                                                                
                                                                                
                                                                                
                                                                                
                                                                                
                                                                                
                                                                                
                                                                                
                                                                                
                                                                                
                                                                                
                  



Results

          No Information                                                        
                      



Social History

          No Information                                                        
                                



Vital Signs

          



                    ID                  Date                Data Source

 

                    UNK                                      









           Name       Value      Range      Interpretation Code Description Data

 Source(s)

 

           Heart rate 80 /min                          80 /min    [facility] ([facility], )

 

           Systolic blood pressure 130 mm[Hg]                       130 mm[Hg] [facility]

 

           Diastolic blood pressure 80 mm[Hg]                        80 mm[Hg]  

[facility] ([facility], )

 

           Respiratory rate 20 /min                          20 /min    [facility] (

[facility], )

 

           Diastolic blood pressure 80 mm[Hg]                        80 mm[Hg]  

[facility] ([facility], )

 

           Heart rate 76 /min                          76 /min    [facility] ([facility], )

 

           Systolic blood pressure 110 mm[Hg]                       110 mm[Hg] M

[facility] ([facility], )

 

           Respiratory rate 16 /min                          16 /min    [facility] (

[facility], )



                                                                                
                  



Patient Treatment Plan of Care

          



             Planned Activity Planned Date Details      Description  Data Source

(s)

 

                          24 HR venlafaxine 150 MG Extended Release Oral Capsule

 [Effexor] 2021 

12:00:00 AM EDT                                             [facility])

 

                          24 HR venlafaxine 75 MG Extended Release Oral Capsule 

[Effexor] 2021 

12:00:00 AM EDT                                             [facility])

 

             buspirone hydrochloride 10 MG Oral Tablet 2021 12:00:00 AM ED

T                           

[facility])

 

             buspirone hydrochloride 10 MG Oral Tablet 2021 12:00:00 AM ED

T                           

[facility] ([facility])

 

                          24 HR venlafaxine 75 MG Extended Release Oral Capsule 

[Effexor] 2021 

12:00:00 AM EDT                                             [facility] ([facility])

 

                          24 HR venlafaxine 150 MG Extended Release Oral Capsule

 [Effexor] 2021 

12:00:00 AM EDT                                             [facility] ([facility])

 

             buspirone hydrochloride 10 MG Oral Tablet 2021 12:00:00 AM ED

T                           

[facility] ([facility])

 

                          24 HR venlafaxine 150 MG Extended Release Oral Capsule

 [Effexor] 2021 

12:00:00 AM EDT                                             [facility]

 

                          24 HR venlafaxine 75 MG Extended Release Oral Capsule 

[Effexor] 2021 

12:00:00 AM EDT                                             [facility]

 

                          24 HR venlafaxine 150 MG Extended Release Oral Capsule

 [Effexor] 2021 

12:00:00 AM EDT                                             [facility] ([facility])

 

                          24 HR venlafaxine 75 MG Extended Release Oral Capsule 

[Effexor] 2021 

12:00:00 AM EDT                                             [facility]

 

             buspirone hydrochloride 10 MG Oral Tablet 2021 12:00:00 AM ED

T                           

[facility])

 

                          24 HR venlafaxine 150 MG Extended Release Oral Capsule

 [Effexor] 2021 

12:00:00 AM EDT                                             [facility]

 

             buspirone hydrochloride 10 MG Oral Tablet 2021 12:00:00 AM ED

T                           

[facility])

 

                          24 HR venlafaxine 75 MG Extended Release Oral Capsule 

[Effexor] 2021 

12:00:00 AM EDT                                             [facility] ([facility])

 

             buspirone hydrochloride 10 MG Oral Tablet 2021 12:00:00 AM ED

T                           

[facility]

 

                          24 HR venlafaxine 150 MG Extended Release Oral Capsule

 [Effexor] 2021 

12:00:00 AM EDT                                             [facility]

 

                          24 HR venlafaxine 75 MG Extended Release Oral Capsule 

[Effexor] 2021 

12:00:00 AM EDT                                             [facility]

 

                          24 HR venlafaxine 75 MG Extended Release Oral Capsule 

[Effexor] 2021 

12:00:00 AM EDT                                             [facility]

 

                          24 HR venlafaxine 75 MG Extended Release Oral Capsule 

[Effexor] 2021 

12:00:00 AM EDT                                             [facility]

 

             buspirone hydrochloride 10 MG Oral Tablet 2021 12:00:00 AM ED

T                           

[facility])

 

                          24 HR venlafaxine 150 MG Extended Release Oral Capsule

 [Effexor] 2021 

12:00:00 AM EDT                                             [facility]

 

                          24 HR venlafaxine 150 MG Extended Release Oral Capsule

 [Effexor] 2021 

12:00:00 AM EDT                                             [facility]

 

                          24 HR venlafaxine 75 MG Extended Release Oral Capsule 

[Effexor] 2021 

12:00:00 AM EDT                                             [facility]

 

                          24 HR venlafaxine 150 MG Extended Release Oral Capsule

 [Effexor] 2021 

12:00:00 AM EDT                                             [facility]

 

             buspirone hydrochloride 10 MG Oral Tablet 2021 12:00:00 AM ED

T                           

[facility])

 

             buspirone hydrochloride 10 MG Oral Tablet 2021 12:00:00 AM ED

T                           

[facility]

 

             buspirone hydrochloride 10 MG Oral Tablet 2021 12:00:00 AM ED

T                           

[facility])

 

                          24 HR venlafaxine 75 MG Extended Release Oral Capsule 

[Effexor] 2021 

12:00:00 AM EDT                                             [facility]

 

                          24 HR venlafaxine 150 MG Extended Release Oral Capsule

 [Effexor] 2021 

12:00:00 AM EDT                                             [facility]

 

                          24 HR venlafaxine 75 MG Extended Release Oral Capsule 

[Effexor] 2021 

12:00:00 AM EDT                                             [facility]

 

                          24 HR venlafaxine 75 MG Extended Release Oral Capsule 

[Effexor] 2021 

12:00:00 AM EDT                                             [facility] ([facility])

 

                          24 HR venlafaxine 75 MG Extended Release Oral Capsule 

[Effexor] 2021 

12:00:00 AM EDT                                             [facility]

 

                          24 HR venlafaxine 75 MG Extended Release Oral Capsule 

[Effexor] 2021 

12:00:00 AM EDT                                             [facility]

 

                          24 HR venlafaxine 75 MG Extended Release Oral Capsule 

[Effexor] 2021 

12:00:00 AM EDT                                             [facility]

 

                          24 HR venlafaxine 75 MG Extended Release Oral Capsule 

[Effexor] 2021 

12:00:00 AM EDT                                             [facility]

 

                          24 HR venlafaxine 75 MG Extended Release Oral Capsule 

[Effexor] 2021 

12:00:00 AM EDT                                             [facility]

 

                          24 HR venlafaxine 75 MG Extended Release Oral Capsule 

[Effexor] 2021 

12:00:00 AM EDT                                             [facility] ([facility])

 

                          24 HR venlafaxine 75 MG Extended Release Oral Capsule 

[Effexor] 2021 

12:00:00 AM EDT                                             [facility] ([facility])

 

                          24 HR venlafaxine 75 MG Extended Release Oral Capsule 

[Effexor] 2021 

12:00:00 AM EDT                                             [facility] ([facility])

 

             Mirtazapine 15 MG Oral Tablet [Remeron] 2021 12:00:00 AM EDT 

                          [facility]

 ([facility])

 

             Mirtazapine 15 MG Oral Tablet [Remeron] 2021 12:00:00 AM EDT 

                          [facility]

 ([facility])

 

             Mirtazapine 15 MG Oral Tablet [Remeron] 2021 12:00:00 AM EDT 

                          [facility]

 ([facility])

 

             Mirtazapine 15 MG Oral Tablet [Remeron] 2021 12:00:00 AM EDT 

                          [facility]

 ([facility])

 

             Mirtazapine 15 MG Oral Tablet [Remeron] 2021 12:00:00 AM EDT 

                          [facility]

 ([facility])

 

             Mirtazapine 15 MG Oral Tablet [Remeron] 2021 12:00:00 AM EDT 

                          [facility]

 ([facility])

 

             Mirtazapine 15 MG Oral Tablet [Remeron] 2021 12:00:00 AM EDT 

                          [facility]

 ([facility])

 

             Mirtazapine 15 MG Oral Tablet [Remeron] 2021 12:00:00 AM EDT 

                          [facility]

 ([facility])

 

             Mirtazapine 15 MG Oral Tablet [Remeron] 2021 12:00:00 AM EDT 

                          [facility]

 ([facility])

 

             Mirtazapine 15 MG Oral Tablet [Remeron] 2021 12:00:00 AM EDT 

                          [facility]

 ([facility])

 

                          24 HR venlafaxine 150 MG Extended Release Oral Capsule

 [Effexor] 2021 

12:00:00 AM EST                                             [facility]

 

                          24 HR venlafaxine 150 MG Extended Release Oral Capsule

 [Effexor] 2021 

12:00:00 AM EST                                             [facility] ([facility])

 

                          24 HR venlafaxine 150 MG Extended Release Oral Capsule

 [Effexor] 2021 

12:00:00 AM EST                                             [facility] ([facility])

 

                          24 HR venlafaxine 150 MG Extended Release Oral Capsule

 [Effexor] 2021 

12:00:00 AM EST                                             [facility] ([facility])

 

                          24 HR venlafaxine 150 MG Extended Release Oral Capsule

 [Effexor] 2021 

12:00:00 AM EST                                             [employer] ([facility])

 

                          24 HR venlafaxine 150 MG Extended Release Oral Capsule

 [Effexor] 2021 

12:00:00 AM EST                                             [employer] ([facility])

 

                          24 HR venlafaxine 150 MG Extended Release Oral Capsule

 [Effexor] 2021 

12:00:00 AM EST                                             [employer] ([facility])

 

                          24 HR venlafaxine 150 MG Extended Release Oral Capsule

 [Effexor] 2021 

12:00:00 AM EST                                             [employer] ([facility])

 

                          24 HR venlafaxine 150 MG Extended Release Oral Capsule

 [Effexor] 2021 

12:00:00 AM EST                                             [employer] ([facility])

 

                          24 HR venlafaxine 150 MG Extended Release Oral Capsule

 [Effexor] 2021 

12:00:00 AM EST                                             [employer] ([facility])

 

                          24 HR venlafaxine 75 MG Extended Release Oral Capsule 

[Effexor] 02/15/2021 

12:00:00 AM EST                                             [employer] ([facility])

 

                          24 HR venlafaxine 75 MG Extended Release Oral Capsule 

[Effexor] 02/15/2021 

12:00:00 AM EST                                             [employer] ([facility])

 

                          24 HR venlafaxine 75 MG Extended Release Oral Capsule 

[Effexor] 02/15/2021 

12:00:00 AM EST                                             [employer] ([facility])

 

                          24 HR venlafaxine 75 MG Extended Release Oral Capsule 

[Effexor] 02/15/2021 

12:00:00 AM EST                                             [employer] ([facility])

 

                          24 HR venlafaxine 75 MG Extended Release Oral Capsule 

[Effexor] 02/15/2021 

12:00:00 AM EST                                             [employer] ([facility])

 

                          24 HR venlafaxine 75 MG Extended Release Oral Capsule 

[Effexor] 02/15/2021 

12:00:00 AM EST                                             [employer] ([facility])

 

                          24 HR venlafaxine 75 MG Extended Release Oral Capsule 

[Effexor] 02/15/2021 

12:00:00 AM EST                                             [employer] ([facility])

 

                          24 HR venlafaxine 75 MG Extended Release Oral Capsule 

[Effexor] 02/15/2021 

12:00:00 AM EST                                             [employer] ([facility])

 

                          24 HR venlafaxine 75 MG Extended Release Oral Capsule 

[Effexor] 02/15/2021 

12:00:00 AM EST                                             [employer] ([facility])

 

                          24 HR venlafaxine 75 MG Extended Release Oral Capsule 

[Effexor] 02/15/2021 

12:00:00 AM EST                                             [employer] ([facility])

 

             Mirtazapine 15 MG Oral Tablet [Remeron] 2021 12:00:00 AM EST 

                          [employer]

 ([facility])

 

             Mirtazapine 15 MG Oral Tablet [Remeron] 2021 12:00:00 AM EST 

                          [employer]

 ([facility])

 

             Mirtazapine 15 MG Oral Tablet [Remeron] 2021 12:00:00 AM EST 

                          [employer]

 ([facility])

 

             Mirtazapine 15 MG Oral Tablet [Remeron] 2021 12:00:00 AM EST 

                          [employer]

 ([facility])

 

             Mirtazapine 15 MG Oral Tablet [Remeron] 2021 12:00:00 AM EST 

                          [employer]

 ([facility])

 

             Mirtazapine 15 MG Oral Tablet [Remeron] 2021 12:00:00 AM EST 

                          [employer]

 ([facility])

 

             Mirtazapine 15 MG Oral Tablet [Remeron] 2021 12:00:00 AM EST 

                          [employer]

 ([facility])

 

             Mirtazapine 15 MG Oral Tablet [Remeron] 2021 12:00:00 AM EST 

                          [employer]

 ([facility])

 

             Mirtazapine 15 MG Oral Tablet [Remeron] 2021 12:00:00 AM [facility]

 ([facility])

 

             Mirtazapine 15 MG Oral Tablet [Remeron] 2021 12:00:00 AM EST 

                          [facility]

 ([facility])

 

             Hydroxyzine Hydrochloride 25 MG Oral Tablet 2021 12:00:00 AM 

[facility] ([facility])

 

             Hydroxyzine Hydrochloride 25 MG Oral Tablet 2021 12:00:00 AM 

EST                           

[facility] ([facility])

 

             Hydroxyzine Hydrochloride 25 MG Oral Tablet 2021 12:00:00 AM 

[facility] ([facility])

 

             Hydroxyzine Hydrochloride 25 MG Oral Tablet 2021 12:00:00 AM 

[facility] ([facility])

 

             Hydroxyzine Hydrochloride 25 MG Oral Tablet 2021 12:00:00 AM 

[facility] ([facility])

 

             Hydroxyzine Hydrochloride 25 MG Oral Tablet 2021 12:00:00 AM 

[facility] ([facility])

 

             Hydroxyzine Hydrochloride 25 MG Oral Tablet 2021 12:00:00 AM 

[facility] ([facility])

 

             Hydroxyzine Hydrochloride 25 MG Oral Tablet 2021 12:00:00 AM 

[facility] ([facility])

 

             Hydroxyzine Hydrochloride 25 MG Oral Tablet 2021 12:00:00 AM 

[facility] ([facility])

 

             Hydroxyzine Hydrochloride 25 MG Oral Tablet 2021 12:00:00 AM 

EST                           

[facility] ([facility])

 

                          24 HR venlafaxine 75 MG Extended Release Oral Capsule 

[Effexor] 2021 

12:00:00 AM EST                                             [facility] ([facility])

 

                          24 HR venlafaxine 75 MG Extended Release Oral Capsule 

[Effexor] 2021 

12:00:00 AM EST                                             [facility] ([facility])

 

                          24 HR venlafaxine 75 MG Extended Release Oral Capsule 

[Effexor] 2021 

12:00:00 AM [facility] ([facility])

 

                          24 HR venlafaxine 75 MG Extended Release Oral Capsule 

[Effexor] 2021 

12:00:00 AM EST                                             [employer] ([facility])

 

                          24 HR venlafaxine 75 MG Extended Release Oral Capsule 

[Effexor] 2021 

12:00:00 AM EST                                             [employer] ([facility])

 

                          24 HR venlafaxine 75 MG Extended Release Oral Capsule 

[Effexor] 2021 

12:00:00 AM EST                                             [facility] ([facility])

 

                          24 HR venlafaxine 75 MG Extended Release Oral Capsule 

[Effexor] 2021 

12:00:00 AM EST                                             [facility] ([facility])

 

                          24 HR venlafaxine 75 MG Extended Release Oral Capsule 

[Effexor] 2021 

12:00:00 AM EST                                             [facility] ([facility])

 

                          24 HR venlafaxine 75 MG Extended Release Oral Capsule 

[Effexor] 2021 

12:00:00 AM EST                                             [facility] ([facility])

 

                          24 HR venlafaxine 75 MG Extended Release Oral Capsule 

[Effexor] 2021 

12:00:00 AM EST                                             [facility] ([facility])

 

                          24 HR Bupropion Hydrochloride 300 MG Extended Release 

Oral Tablet [Wellbutrin] 

2020 12:00:00 AM EST                                         [facility]

 

                          24 HR venlafaxine 150 MG Extended Release Oral Capsule

 [Effexor] 2020 

12:00:00 AM EST                                             [employer] ([facility])

 

                          24 HR venlafaxine 37.5 MG Extended Release Oral Capsul

e [Effexor] 2020 

12:00:00 AM EST                                             [employer] ([facility])

 

                          24 HR Bupropion Hydrochloride 300 MG Extended Release 

Oral Tablet [Wellbutrin] 

2020 12:00:00 AM EST                                         [facility] (No

[facility]

 

                          24 HR venlafaxine 150 MG Extended Release Oral Capsule

 [Effexor] 2020 

12:00:00 AM EST                                             [employer] ([facility])

 

                          24 HR venlafaxine 37.5 MG Extended Release Oral Capsul

e [Effexor] 2020 

12:00:00 AM EST                                             [employer] ([facility])

 

                          24 HR venlafaxine 37.5 MG Extended Release Oral Capsul

e [Effexor] 2020 

12:00:00 AM EST                                             [employer] ([facility])

 

                          24 HR venlafaxine 150 MG Extended Release Oral Capsule

 [Effexor] 2020 

12:00:00 AM EST                                             [employer] ([facility])

 

                          24 HR venlafaxine 150 MG Extended Release Oral Capsule

 [Effexor] 2020 

12:00:00 AM EST                                             [employer] ([facility])

 

                          24 HR Bupropion Hydrochloride 300 MG Extended Release 

Oral Tablet [Wellbutrin] 

2020 12:00:00 AM EST                                         [facility] (No

[facility])

 

                          24 HR venlafaxine 37.5 MG Extended Release Oral Capsul

e [Effexor] 2020 

12:00:00 AM EST                                             [employer] ([facility])

 

                          24 HR venlafaxine 37.5 MG Extended Release Oral Capsul

e [Effexor] 2020 

12:00:00 AM EST                                             [employer] ([facility])

 

                          24 HR Bupropion Hydrochloride 300 MG Extended Release 

Oral Tablet [Wellbutrin] 

2020 12:00:00 AM EST                                         [facility] (No

[facility])

 

                          24 HR venlafaxine 150 MG Extended Release Oral Capsule

 [Effexor] 2020 

12:00:00 AM EST                                             [employer] ([facility])

 

                          24 HR venlafaxine 150 MG Extended Release Oral Capsule

 [Effexor] 2020 

12:00:00 AM EST                                             [employer] ([facility])

 

                          24 HR Bupropion Hydrochloride 300 MG Extended Release 

Oral Tablet [Wellbutrin] 

2020 12:00:00 AM EST                                         [employer] (No

[facility])

 

                          24 HR venlafaxine 37.5 MG Extended Release Oral Capsul

e [Effexor] 2020 

12:00:00 AM EST                                             [employer] ([facility])

 

                          24 HR Bupropion Hydrochloride 300 MG Extended Release 

Oral Tablet [Wellbutrin] 

2020 12:00:00 AM EST                                         [facility] (No

[facility]

 

                          24 HR venlafaxine 37.5 MG Extended Release Oral Capsul

e [Effexor] 2020 

12:00:00 AM EST                                             [facility] ([facility])

 

                          24 HR Bupropion Hydrochloride 300 MG Extended Release 

Oral Tablet [Wellbutrin] 

2020 12:00:00 AM EST                                         [facility] ([facility]

[facility]

 

                          24 HR venlafaxine 150 MG Extended Release Oral Capsule

 [Effexor] 2020 

12:00:00 AM EST                                             [facility] ([facility])

 

                          24 HR venlafaxine 37.5 MG Extended Release Oral Capsul

e [Effexor] 2020 

12:00:00 AM EST                                             [facility] ([facility])

 

                          24 HR venlafaxine 37.5 MG Extended Release Oral Capsul

e [Effexor] 2020 

12:00:00 AM EST                                             [facility] ([facility])

 

                          24 HR Bupropion Hydrochloride 300 MG Extended Release 

Oral Tablet [Wellbutrin] 

2020 12:00:00 AM EST                                         [facility] (No

[facility]

 

                          24 HR venlafaxine 150 MG Extended Release Oral Capsule

 [Effexor] 2020 

12:00:00 AM EST                                             [facility] ([facility])

 

                          24 HR Bupropion Hydrochloride 300 MG Extended Release 

Oral Tablet [Wellbutrin] 

2020 12:00:00 AM EST                                         [facility] ([facility]

[facility]

 

                          24 HR venlafaxine 150 MG Extended Release Oral Capsule

 [Effexor] 2020 

12:00:00 AM EST                                             [facility] ([facility])

 

                          24 HR Bupropion Hydrochloride 300 MG Extended Release 

Oral Tablet [Wellbutrin] 

2020 12:00:00 AM EST                                         [facility] ([facility]

[facility]

 

                          24 HR venlafaxine 37.5 MG Extended Release Oral Capsul

e [Effexor] 2020 

12:00:00 AM EST                                             [facility] ([facility])

 

                          24 HR venlafaxine 150 MG Extended Release Oral Capsule

 [Effexor] 2020 

12:00:00 AM EST                                             [facility]

 

                          24 HR venlafaxine 150 MG Extended Release Oral Capsule

 [Effexor] 2020 

12:00:00 AM EST                                             [facility]

 

             Mirtazapine 15 MG Oral Tablet [Remeron] 2020 12:00:00 AM EST 

                          [facility]

 ([facility])

 

                          24 HR venlafaxine 37.5 MG Extended Release Oral Capsul

e [Effexor] 2020 

12:00:00 AM EST                                             [facility]

 

                          24 HR venlafaxine 150 MG Extended Release Oral Capsule

 [Effexor] 2020 

12:00:00 AM EST                                             [facility]

 

             Mirtazapine 15 MG Oral Tablet [Remeron] 2020 12:00:00 AM EST 

                          [facility]

 ([facility])

 

                          24 HR venlafaxine 150 MG Extended Release Oral Capsule

 [Effexor] 2020 

12:00:00 AM EST                                             [facility]

 

                          24 HR venlafaxine 37.5 MG Extended Release Oral Capsul

e [Effexor] 2020 

12:00:00 AM EST                                             [facility]

 

             Mirtazapine 15 MG Oral Tablet [Remeron] 2020 12:00:00 AM EST 

                          [facility]

 ([facility])

 

             Mirtazapine 15 MG Oral Tablet [Remeron] 2020 12:00:00 AM EST 

                          [facility]

 ([facility])

 

                          24 HR venlafaxine 37.5 MG Extended Release Oral Capsul

e [Effexor] 2020 

12:00:00 AM EST                                             [facility]

 

                          24 HR venlafaxine 150 MG Extended Release Oral Capsule

 [Effexor] 2020 

12:00:00 AM EST                                             [facility]

 

                          24 HR venlafaxine 37.5 MG Extended Release Oral Capsul

e [Effexor] 2020 

12:00:00 AM EST                                             [employer] ([facility])

 

             Mirtazapine 15 MG Oral Tablet [Remeron] 2020 12:00:00 AM EST 

                          [employer]

 ([facility])

 

                          24 HR venlafaxine 150 MG Extended Release Oral Capsule

 [Effexor] 2020 

12:00:00 AM EST                                             [employer] ([facility])

 

                          24 HR venlafaxine 37.5 MG Extended Release Oral Capsul

e [Effexor] 2020 

12:00:00 AM EST                                             [employer] ([facility])

 

                          24 HR venlafaxine 37.5 MG Extended Release Oral Capsul

e [Effexor] 2020 

12:00:00 AM EST                                             [employer] ([facility])

 

             Mirtazapine 15 MG Oral Tablet [Remeron] 2020 12:00:00 AM EST 

                          [employer]

 ([facility])

 

                          24 HR venlafaxine 150 MG Extended Release Oral Capsule

 [Effexor] 2020 

12:00:00 AM EST                                             [employer] ([facility])

 

                          24 HR venlafaxine 150 MG Extended Release Oral Capsule

 [Effexor] 2020 

12:00:00 AM EST                                             [employer] ([facility])

 

             Mirtazapine 15 MG Oral Tablet [Remeron] 2020 12:00:00 AM EST 

                          [employer]

 ([facility])

 

                          24 HR venlafaxine 37.5 MG Extended Release Oral Capsul

e [Effexor] 2020 

12:00:00 AM EST                                             [employer] ([facility])

 

                          24 HR venlafaxine 150 MG Extended Release Oral Capsule

 [Effexor] 2020 

12:00:00 AM EST                                             [employer] ([facility])

 

                          24 HR venlafaxine 150 MG Extended Release Oral Capsule

 [Effexor] 2020 

12:00:00 AM EST                                             [employer] ([facility])

 

             Mirtazapine 15 MG Oral Tablet [Remeron] 2020 12:00:00 AM EST 

                          [employer]

 ([facility])

 

                          24 HR venlafaxine 37.5 MG Extended Release Oral Capsul

e [Effexor] 2020 

12:00:00 AM EST                                             [facility] ([facility])

 

             Mirtazapine 15 MG Oral Tablet [Remeron] 2020 12:00:00 AM EST 

                          [facility]

 ([facility])

 

                          24 HR venlafaxine 37.5 MG Extended Release Oral Capsul

e [Effexor] 2020 

12:00:00 AM EST                                             [facility] ([facility])

 

             Mirtazapine 15 MG Oral Tablet [Remeron] 2020 12:00:00 AM EST 

                          [facility]

 ([facility])

 

                          24 HR venlafaxine 150 MG Extended Release Oral Capsule

 [Effexor] 2020 

12:00:00 AM EST                                             [facility] ([facility])

 

                          24 HR venlafaxine 37.5 MG Extended Release Oral Capsul

e [Effexor] 2020 

12:00:00 AM EST                                             [facility]

 

             Hydroxyzine Hydrochloride 25 MG Oral Tablet 2020 12:00:00 AM 

EST                           

[facility]

 

             Hydroxyzine Hydrochloride 25 MG Oral Tablet 2020 12:00:00 AM 

EST                           

[facility]

 

             Hydroxyzine Hydrochloride 25 MG Oral Tablet 2020 12:00:00 AM 

EST                           

[employer] ([facility])

 

             Hydroxyzine Hydrochloride 25 MG Oral Tablet 2020 12:00:00 AM 

EST                           

[employer] ([facility])

 

             Hydroxyzine Hydrochloride 25 MG Oral Tablet 2020 12:00:00 AM 

EST                           

[employer] ([facility])

 

             Hydroxyzine Hydrochloride 25 MG Oral Tablet 2020 12:00:00 AM 

EST                           

[employer] ([facility])

 

             Hydroxyzine Hydrochloride 25 MG Oral Tablet 2020 12:00:00 AM 

EST                           

[employer] ([facility])

 

             Hydroxyzine Hydrochloride 25 MG Oral Tablet 2020 12:00:00 AM 

EST                           

[facility]

 

             Hydroxyzine Hydrochloride 25 MG Oral Tablet 2020 12:00:00 AM 

EST                           

[facility] ([facility])

 

             Hydroxyzine Hydrochloride 25 MG Oral Tablet 2020 12:00:00 AM 

EST                           

[facility] ([facility])

 

                          24 HR venlafaxine 150 MG Extended Release Oral Capsule

 [Effexor] 10/27/2020 

12:00:00 AM EDT                                             [facility] ([facility])

 

                          24 HR venlafaxine 150 MG Extended Release Oral Capsule

 [Effexor] 10/27/2020 

12:00:00 AM EDT                                             [facility] ([facility])

 

                          24 HR venlafaxine 37.5 MG Extended Release Oral Capsul

e [Effexor] 10/27/2020 

12:00:00 AM EDT                                             [facility] ([facility])

 

                          24 HR venlafaxine 37.5 MG Extended Release Oral Capsul

e [Effexor] 10/27/2020 

12:00:00 AM EDT                                             [facility] ([facility])

 

                          24 HR venlafaxine 37.5 MG Extended Release Oral Capsul

e [Effexor] 10/27/2020 

12:00:00 AM EDT                                             [facility] ([facility])

 

                          24 HR venlafaxine 150 MG Extended Release Oral Capsule

 [Effexor] 10/27/2020 

12:00:00 AM EDT                                             [facility] ([facility])

 

                          24 HR venlafaxine 150 MG Extended Release Oral Capsule

 [Effexor] 10/27/2020 

12:00:00 AM EDT                                             [facility] ([facility])

 

                          24 HR venlafaxine 37.5 MG Extended Release Oral Capsul

e [Effexor] 10/27/2020 

12:00:00 AM EDT                                             [facility] ([facility])

 

                          24 HR venlafaxine 150 MG Extended Release Oral Capsule

 [Effexor] 10/27/2020 

12:00:00 AM EDT                                             [facility] ([facility])

 

                          24 HR venlafaxine 37.5 MG Extended Release Oral Capsul

e [Effexor] 10/27/2020 

12:00:00 AM EDT                                             [facility] ([facility])

 

                          24 HR venlafaxine 150 MG Extended Release Oral Capsule

 [Effexor] 10/27/2020 

12:00:00 AM EDT                                             [facility] ([facility])

 

                          24 HR venlafaxine 37.5 MG Extended Release Oral Capsul

e [Effexor] 10/27/2020 

12:00:00 AM EDT                                             [facility] ([facility])

 

                          24 HR venlafaxine 37.5 MG Extended Release Oral Capsul

e [Effexor] 10/27/2020 

12:00:00 AM EDT                                             [facility] ([facility])

 

                          24 HR venlafaxine 150 MG Extended Release Oral Capsule

 [Effexor] 10/27/2020 

12:00:00 AM EDT                                             [facility] ([facility])

 

                          24 HR venlafaxine 150 MG Extended Release Oral Capsule

 [Effexor] 10/27/2020 

12:00:00 AM EDT                                             [facility] ([facility])

 

                          24 HR venlafaxine 150 MG Extended Release Oral Capsule

 [Effexor] 10/27/2020 

12:00:00 AM EDT                                             [facility] ([facility])

 

                          24 HR venlafaxine 37.5 MG Extended Release Oral Capsul

e [Effexor] 10/27/2020 

12:00:00 AM EDT                                             [facility] ([facility])

 

                          24 HR venlafaxine 37.5 MG Extended Release Oral Capsul

e [Effexor] 10/27/2020 

12:00:00 AM EDT                                             [facility] ([facility])

 

                          24 HR venlafaxine 37.5 MG Extended Release Oral Capsul

e [Effexor] 10/27/2020 

12:00:00 AM EDT                                             [facility] ([facility])

 

                          24 HR venlafaxine 150 MG Extended Release Oral Capsule

 [Effexor] 10/27/2020 

12:00:00 AM EDT                                             [facility] ([facility])

 

             Trazodone Hydrochloride 50 MG Oral Tablet 2020 12:00:00 AM ED

T                           

[facility] ([facility])

 

             Trazodone Hydrochloride 50 MG Oral Tablet 2020 12:00:00 AM ED

T                           

[facility] ([facility])

 

             Trazodone Hydrochloride 50 MG Oral Tablet 2020 12:00:00 AM ED

T                           

[facility] ([facility])

 

             Trazodone Hydrochloride 50 MG Oral Tablet 2020 12:00:00 AM ED

T                           

[facility] ([facility])

 

             Trazodone Hydrochloride 50 MG Oral Tablet 2020 12:00:00 AM ED

T                           

[facility] ([facility])

 

             Trazodone Hydrochloride 50 MG Oral Tablet 2020 12:00:00 AM ED

T                           

[facility] ([facility])

 

             Trazodone Hydrochloride 50 MG Oral Tablet 2020 12:00:00 AM ED

T                           

[facility] ([facility])

 

             Trazodone Hydrochloride 50 MG Oral Tablet 2020 12:00:00 AM ED

T                           

[facility] ([facility])

 

             Trazodone Hydrochloride 50 MG Oral Tablet 2020 12:00:00 AM ED

T                           

[facility] ([facility])

 

             Trazodone Hydrochloride 50 MG Oral Tablet 2020 12:00:00 AM ED

T                           

[facility] ([facility])

 

                          24 HR venlafaxine 150 MG Extended Release Oral Capsule

 2020 12:00:00 AM 

EDT                                                         [facility] ([facility])

 

                          24 HR venlafaxine 37.5 MG Extended Release Oral Capsul

e 2020 12:00:00 AM 

EDT                                                         [facility] ([facility])

 

                          24 HR venlafaxine 150 MG Extended Release Oral Capsule

 2020 12:00:00 AM 

EDT                                                         [facility] ([facility])

 

                          24 HR venlafaxine 37.5 MG Extended Release Oral Capsul

e 2020 12:00:00 AM 

EDT                                                         [facility] ([facility])

 

                          24 HR venlafaxine 37.5 MG Extended Release Oral Capsul

e 2020 12:00:00 AM 

EDT                                                         [facility] ([facility])

 

                          24 HR venlafaxine 150 MG Extended Release Oral Capsule

 2020 12:00:00 AM 

EDT                                                         [facility] ([facility])

 

                          24 HR venlafaxine 150 MG Extended Release Oral Capsule

 2020 12:00:00 AM 

EDT                                                         [facility] ([facility])

 

                          24 HR venlafaxine 37.5 MG Extended Release Oral Capsul

e 2020 12:00:00 AM 

EDT                                                         [facility] ([facility])

 

                          24 HR venlafaxine 37.5 MG Extended Release Oral Capsul

e 2020 12:00:00 AM 

EDT                                                         [facility] ([facility] [facility])

 

                          24 HR venlafaxine 150 MG Extended Release Oral Capsule

 2020 12:00:00 AM 

EDT                                                         [facility] ([facility])

 

                          24 HR venlafaxine 37.5 MG Extended Release Oral Capsul

e 2020 12:00:00 AM 

EDT                                                         [facility] ([facility])

 

                          24 HR venlafaxine 150 MG Extended Release Oral Capsule

 2020 12:00:00 AM 

EDT                                                         [facility] ([facility])

 

                          24 HR venlafaxine 150 MG Extended Release Oral Capsule

 2020 12:00:00 AM 

EDT                                                         [facility] ([facility])

 

                          24 HR venlafaxine 37.5 MG Extended Release Oral Capsul

e 2020 12:00:00 AM 

EDT                                                         [facility] ([facility])

 

                          24 HR venlafaxine 37.5 MG Extended Release Oral Capsul

e 2020 12:00:00 AM 

EDT                                                         [facility] ([facility])

 

                          24 HR venlafaxine 150 MG Extended Release Oral Capsule

 2020 12:00:00 AM 

EDT                                                         [facility] ([facility])

 

                          24 HR venlafaxine 37.5 MG Extended Release Oral Capsul

e 2020 12:00:00 AM 

EDT                                                         [facility] ([facility])

 

                          24 HR venlafaxine 150 MG Extended Release Oral Capsule

 2020 12:00:00 AM 

EDT                                                         [facility] ([facility])

 

                          24 HR venlafaxine 150 MG Extended Release Oral Capsule

 2020 12:00:00 AM 

EDT                                                         [facility] ([facility])

 

                          24 HR venlafaxine 37.5 MG Extended Release Oral Capsul

e 2020 12:00:00 AM 

EDT                                                         [facility] ([facility])

 

                          24 HR venlafaxine 150 MG Extended Release Oral Capsule

 2020 12:00:00 AM 

EDT                                                         [facility] ([facility])

 

                          24 HR venlafaxine 150 MG Extended Release Oral Capsule

 2020 12:00:00 AM 

EDT                                                         [facility] ([facility])

 

                          24 HR venlafaxine 37.5 MG Extended Release Oral Capsul

e 2020 12:00:00 AM 

EDT                                                         [facility] ([facility])

 

                          24 HR venlafaxine 37.5 MG Extended Release Oral Capsul

e 2020 12:00:00 AM 

EDT                                                         [facility] ([facility])

 

                          24 HR venlafaxine 150 MG Extended Release Oral Capsule

 2020 12:00:00 AM 

EDT                                                         [facility] ([facility])

 

                          24 HR venlafaxine 37.5 MG Extended Release Oral Capsul

e 2020 12:00:00 AM 

EDT                                                         [facility] ([facility])

 

                          24 HR venlafaxine 150 MG Extended Release Oral Capsule

 2020 12:00:00 AM 

EDT                                                         [facility] ([facility])

 

                          24 HR venlafaxine 37.5 MG Extended Release Oral Capsul

e 2020 12:00:00 AM 

EDT                                                         [facility] ([facility])

 

                          24 HR venlafaxine 150 MG Extended Release Oral Capsule

 2020 12:00:00 AM 

EDT                                                         [facility] ([facility])

 

                          24 HR venlafaxine 37.5 MG Extended Release Oral Capsul

e 2020 12:00:00 AM 

EDT                                                         [facility] ([facility])

 

                          24 HR venlafaxine 150 MG Extended Release Oral Capsule

 2020 12:00:00 AM 

EDT                                                         [facility] ([facility])

 

                          24 HR venlafaxine 37.5 MG Extended Release Oral Capsul

e 2020 12:00:00 AM 

EDT                                                         [facility] ([facility])

 

                          24 HR venlafaxine 37.5 MG Extended Release Oral Capsul

e 2020 12:00:00 AM 

EDT                                                         [facility] ([facility])

 

                          24 HR venlafaxine 150 MG Extended Release Oral Capsule

 2020 12:00:00 AM 

EDT                                                         [facility] ([facility])

 

                          24 HR venlafaxine 150 MG Extended Release Oral Capsule

 2020 12:00:00 AM 

EDT                                                         [facility] ([facility])

 

                          24 HR venlafaxine 37.5 MG Extended Release Oral Capsul

e 2020 12:00:00 AM 

EDT                                                         [facility] ([facility])

 

                          24 HR venlafaxine 150 MG Extended Release Oral Capsule

 2020 12:00:00 AM 

EDT                                                         [facility] ([facility])

 

                          24 HR venlafaxine 37.5 MG Extended Release Oral Capsul

e 2020 12:00:00 AM 

EDT                                                         [facility] ([facility])

 

                          24 HR venlafaxine 150 MG Extended Release Oral Capsule

 2020 12:00:00 AM 

EDT                                                         [facility] ([facility])

 

                          24 HR venlafaxine 37.5 MG Extended Release Oral Capsul

e 2020 12:00:00 AM 

EDT                                                         [facility] ([facility])

 

             Hydroxyzine Hydrochloride 25 MG Oral Tablet 2020 12:00:00 AM 

EDT                           

[facility] ([facility])

 

             Hydroxyzine Hydrochloride 25 MG Oral Tablet 2020 12:00:00 AM 

EDT                           

[facility] ([facility])

 

             Hydroxyzine Hydrochloride 25 MG Oral Tablet 2020 12:00:00 AM 

EDT                           

[facility] ([facility])

 

             Hydroxyzine Hydrochloride 25 MG Oral Tablet 2020 12:00:00 AM 

EDT                           

[facility] ([facility])

 

             Hydroxyzine Hydrochloride 25 MG Oral Tablet 2020 12:00:00 AM 

EDT                           

[facility] ([facility])

 

             Hydroxyzine Hydrochloride 25 MG Oral Tablet 2020 12:00:00 AM 

EDT                           

[facility] ([facility])

 

             Hydroxyzine Hydrochloride 25 MG Oral Tablet 2020 12:00:00 AM 

EDT                           

[facility] ([facility])

 

             Hydroxyzine Hydrochloride 25 MG Oral Tablet 2020 12:00:00 AM 

EDT                           

[facility]

 

             Hydroxyzine Hydrochloride 25 MG Oral Tablet 2020 12:00:00 AM 

EDT                           

[facility]

 

             Hydroxyzine Hydrochloride 25 MG Oral Tablet 2020 12:00:00 AM 

EDT                           

[facility] ([facility])

 

                          24 HR Bupropion Hydrochloride 300 MG Extended Release 

Oral Tablet 2020 

12:00:00 AM EDT                                             [facility] ([facility])

 

             Trazodone Hydrochloride 50 MG Oral Tablet 2020 12:00:00 AM ED

T                           

[facility] ([facility])

 

             Trazodone Hydrochloride 50 MG Oral Tablet 2020 12:00:00 AM ED

T                           

[facility] ([facility])

 

                          24 HR Bupropion Hydrochloride 300 MG Extended Release 

Oral Tablet 2020 

12:00:00 AM EDT                                             [facility] ([facility])

 

                          24 HR Bupropion Hydrochloride 300 MG Extended Release 

Oral Tablet 2020 

12:00:00 AM EDT                                             [facility] ([facility])

 

             Trazodone Hydrochloride 50 MG Oral Tablet 2020 12:00:00 AM ED

T                           

[facility] ([facility])

 

                          24 HR Bupropion Hydrochloride 300 MG Extended Release 

Oral Tablet 2020 

12:00:00 AM EDT                                             [facility] ([facility])

 

             Trazodone Hydrochloride 50 MG Oral Tablet 2020 12:00:00 AM ED

T                           

[facility]

 

                          24 HR Bupropion Hydrochloride 300 MG Extended Release 

Oral Tablet 2020 

12:00:00 AM EDT                                             [facility] ([facility])

 

             Trazodone Hydrochloride 50 MG Oral Tablet 2020 12:00:00 AM ED

T                           

[facility] ([facility])

 

                          24 HR Bupropion Hydrochloride 300 MG Extended Release 

Oral Tablet 2020 

12:00:00 AM EDT                                             [facility] ([facility])

 

             Trazodone Hydrochloride 50 MG Oral Tablet 2020 12:00:00 AM ED

T                           

[place] ([facility])

 

             Trazodone Hydrochloride 50 MG Oral Tablet 2020 12:00:00 AM ED

T                           

[place] ([facility])

 

                          24 HR Bupropion Hydrochloride 300 MG Extended Release 

Oral Tablet 2020 

12:00:00 AM EDT                                             [facility] ([facility])

 

             Trazodone Hydrochloride 50 MG Oral Tablet 2020 12:00:00 AM ED

T                           

[place] ([facility])

 

             Trazodone Hydrochloride 50 MG Oral Tablet 2020 12:00:00 AM ED

T                           

[place] ([facility])

 

                          24 HR Bupropion Hydrochloride 300 MG Extended Release 

Oral Tablet 2020 

12:00:00 AM EDT                                             [facility] ([facility])

 

                          24 HR Bupropion Hydrochloride 300 MG Extended Release 

Oral Tablet 2020 

12:00:00 AM EDT                                             [facility] ([facility])

 

             Trazodone Hydrochloride 50 MG Oral Tablet 2020 12:00:00 AM ED

T                           

[facility] ([facility])

 

                          24 HR Bupropion Hydrochloride 300 MG Extended Release 

Oral Tablet 2020 

12:00:00 AM EDT                                             [facility] ([facility])

 

             buspirone hydrochloride 15 MG Oral Tablet 2019 12:00:00 AM ED

T                           

[facility] ([facility])

 

             buspirone hydrochloride 15 MG Oral Tablet 2019 12:00:00 AM ED

T                           

[facility] ([facility])

 

             buspirone hydrochloride 15 MG Oral Tablet 2019 12:00:00 AM ED

T                           

[facility] ([facility])

 

             buspirone hydrochloride 15 MG Oral Tablet 2019 12:00:00 AM ED

T                           

[place] ([facility])

 

             buspirone hydrochloride 15 MG Oral Tablet 2019 12:00:00 AM ED

T                           

[place] ([facility])

 

             buspirone hydrochloride 15 MG Oral Tablet 2019 12:00:00 AM ED

T                           

[facility] ([facility])

 

             buspirone hydrochloride 15 MG Oral Tablet 2019 12:00:00 AM ED

[facility] ([facility])

 

             buspirone hydrochloride 15 MG Oral Tablet 2019 12:00:00 AM ED

T                           

[facility] ([facility])

 

             buspirone hydrochloride 15 MG Oral Tablet 2019 12:00:00 AM ED

[facility] ([facility])

 

             buspirone hydrochloride 15 MG Oral Tablet 2019 12:00:00 AM ED

[facility] ([facility])

## 2021-10-16 NOTE — CCD
Transition of Care

                             Created on: 2021



Kath Ken

External Reference #: [id number]

: 1970

Sex: Female



Demographics





                          Address                   [address] [place] [address]

 

                          Home Phone                [phone]

 

                          Preferred Language        English

 

                          Marital Status            Unknown

 

                          Synagogue Affiliation     Unknown

 

                          Race                      Unknown

 

                          Additional Race(s)        Unknown



 

                          Ethnic Group              Unknown





Author





                          Author                    Kath Flaherty

 

                          Organization              [facility]

 

                          Address                   Unknown

 

                          Phone                     Unavailable







Support





                Name            Relationship    Address         Phone

 

                    Next Of Kin         Unknown             Unavailable







Care Team Providers





                    Care Team Member Name Role                Phone

 

                    Yoseph Flaherty     PCP                 Unavailable



                                                            



Allergies, Adverse Reactions, Alerts

                      



                    Allergy Substance                   Code                   C

odeSystem           

                    Reaction                   Severity                   Critic

ality        

                          Status                    Start Date                

 

                metformin                                                       

   nausea: high 

blood sugar                   Moderate                                       

Active                                                  



                                                                                
             



Medications

                      



                    Medication                   Medication Code                

   Medication 

CodeSystem                   Start Date                   Stop Date             

                Route                   Dose                   Status           

        

Fill Instructions                

 

                Effexor XR                   772857                   RxNorm    

               

2019                   oral                  

                          150 mg  capsule,extended release 24hr                 

   completed              

                                              for 30 day(s)                

 

                    hydroxyzine HCl                   [id number]                   R

xNorm               

                    2020                   or

al               

                    25 mg  tablet                    completed                  

       for 30 

day(s)                

 

                Effexor XR                   640185                   RxNorm    

               

2019                   2019-10-24                   oral                  

                          150 mg  capsule,extended release 24hr                 

   completed              

                                              for 30 day(s)                

 

                Effexor XR                   314178                   RxNorm    

               

2021                   oral                  

                          75 mg  capsule,extended release 24hr                  

  completed               

                                              for 30 day(s)                

 

                Effexor XR                   491203                   RxNorm    

               

2018                   oral                  

                          75 mg  capsule,extended release 24hr                  

  completed               

                                                             

 

                trazodone                   521182                   RxNorm     

              

2020                   oral                  

                    50 mg  tablet                    completed                  

       for 30 day(s)

               

 

                buspirone                   176141                   RxNorm     

              

2021                   oral                  

                    10 mg  tablet                    active                     

    for 30 day(s)   

            

 

                Effexor XR                   963590                   RxNorm    

               

2019                   oral                  

                          37.5 mg  capsule,extended release 24hr                

    completed             

                                              for 30 day(s)                

 

                Effexor XR                   456662                   RxNorm    

               

2019                   oral                  

                          37.5 mg  capsule,extended release 24hr                

    completed             

                                              for 30 day(s)                

 

                Effexor XR                   607101                   RxNorm    

               

2018                   oral                  

                          37.5 mg  capsule,extended release 24hr                

    completed             

                                                             

 

                trazodone                   121117                   RxNorm     

              

2020                   oral                  

                    50 mg  tablet                    completed                  

       for 30 day(s)

               

 

                Effexor XR                   613161                   RxNorm    

               

2021                   oral                  

                          150 mg  capsule,extended release 24hr                 

   completed              

                                              for 30 day(s)                

 

                venlafaxine                   012582                   RxNorm   

                

2020                   oral                  

                          150 mg  capsule,extended release 24hr                 

   completed              

                                              for 30 day(s)                

 

                venlafaxine                   038269                   RxNorm   

                

2019                   oral                  

                          150 mg  capsule,extended release 24hr                 

   completed              

                                              for 30 day(s)                

 

                Effexor XR                   990473                   RxNorm    

               

2019                   oral                  

                          150 mg  capsule,extended release 24hr                 

   completed              

                                              for 30 day(s)                

 

                Effexor XR                   967128                   RxNorm    

               

2021-02-15                   2021                   oral                  

                          75 mg  capsule,extended release 24hr                  

  completed               

                                              for 30 day(s)                

 

                venlafaxine                   198191                   RxNorm   

                

2019-10-29                   2019                   oral                  

                          37.5 mg  capsule,extended release 24hr                

    completed             

                                              for 30 day(s)                

 

                Effexor XR                   142176                   RxNorm    

               

2019                   oral                  

                          37.5 mg  capsule,extended release 24hr                

    completed             

                                              for 30 day(s)                

 

                Remeron                   148145                   RxNorm       

            

2020                   oral                  

                    15 mg  tablet                    completed                  

       for 30 day(s)

               

 

                Effexor XR                   961547                   RxNorm    

               

2020                   oral                  

                          150 mg  capsule,extended release 24hr                 

   completed              

                                              for 30 day(s)                

 

                venlafaxine                   406414                   RxNorm   

                

2020                   oral                  

                          150 mg  capsule,extended release 24hr                 

   completed              

                                              for 30 day(s)                

 

                Effexor XR                   927451                   RxNorm    

               

2018                   oral                  

                          37.5 mg  capsule,extended release 24hr                

    completed             

                                                             

 

                Effexor XR                   279483                   RxNorm    

               

2018                   oral                  

                          37.5 mg  capsule,extended release 24hr                

    completed             

                                                             

 

                    hydroxyzine HCl                   377846                   R

xNorm               

                    2019                   or

al               

                    25 mg  tablet                    completed                  

       for 30 

day(s)                

 

                    hydroxyzine HCl                   277943                   R

xNorm               

                    2021                   or

al               

                    25 mg  tablet                    active                     

    for 30 day(s)

               

 

                trazodone                   109287                   RxNorm     

              

2020                   oral                  

                    50 mg  tablet                    completed                  

       for 30 day(s)

               

 

                venlafaxine                   250832                   RxNorm   

                

2019-10-29                   2019                   oral                  

                          150 mg  capsule,extended release 24hr                 

   completed              

                                              for 30 day(s)                

 

                    bupropion HCl                   [id number]                   RxN

orm                 

                    2020                   or

al                 

                          300 mg  tablet extended release 24 hr                 

   completed             

                                              for 30 day(s)                

 

                venlafaxine                   951907                   RxNorm   

                

2020                   oral                  

                          150 mg  capsule,extended release 24hr                 

   completed              

                                              for 30 day(s)                

 

                Effexor XR                   556031                   RxNorm    

               

2021                   oral                  

                          75 mg  capsule,extended release 24hr                  

  active                  

                                              for 30 day(s)                

 

                venlafaxine                   003134                   RxNorm   

                

2020                   2020-10-18                   oral                  

                          37.5 mg  capsule,extended release 24hr                

    completed             

                                              for 30 day(s)                

 

                    hydroxyzine HCl                   227395                   R

xNorm               

                    2020                   or

al               

                    25 mg  tablet                    completed                  

       for 30 

day(s)                

 

                Effexor XR                   633367                   RxNorm    

               

2019                   oral                  

                          150 mg  capsule,extended release 24hr                 

   completed              

                                              for 30 day(s)                

 

                Effexor XR                   261022                   RxNorm    

               

2019                   oral                  

                          150 mg  capsule,extended release 24hr                 

   completed              

                                              for 30 day(s)                

 

                    bupropion HCl                   [id number]                   RxN

orm                 

                    2019                   or

al                 

                          300 mg  tablet extended release 24 hr                 

   completed             

                                              for 30 day(s)                

 

                    bupropion HCl                   054726                   RxN

orm                 

                    2020                   or

al                 

                          300 mg  tablet extended release 24 hr                 

   completed             

                                              for 30 day(s)                

 

                Effexor XR                   142330                   RxNorm    

               

2020-10-27                   2020                   oral                  

                          150 mg  capsule,extended release 24hr                 

   completed              

                                              for 30 day(s)                

 

                Effexor XR                   515569                   RxNorm    

               

2019                   2019-10-24                   oral                  

                          37.5 mg  capsule,extended release 24hr                

    completed             

                                              for 30 day(s)                

 

                    Wellbutrin XL                   963950                   RxN

orm                 

                    2020                   or

al                 

                          300 mg  tablet extended release 24 hr                 

   active                

                                              for 30 day(s)                

 

                Effexor XR                   997788                   RxNorm    

               

2018                   oral                  

                          75 mg  capsule,extended release 24hr                  

  completed               

                                                             

 

                venlafaxine                   529856                   RxNorm   

                

2020                   2020-10-18                   oral                  

                          150 mg  capsule,extended release 24hr                 

   completed              

                                              for 30 day(s)                

 

                venlafaxine                   144387                   RxNorm   

                

2020                   oral                  

                          150 mg  capsule,extended release 24hr                 

   completed              

                                              for 30 day(s)                

 

                Effexor XR                   269895                   RxNorm    

               

2021                   oral                  

                          150 mg  capsule,extended release 24hr                 

   active                 

                                              for 30 day(s)                

 

                Effexor XR                   630700                   RxNorm    

               

2018                   oral                  

                          75 mg  capsule,extended release 24hr                  

  completed               

                                              for 30 day(s)                

 

                venlafaxine                   738762                   RxNorm   

                

2020                   oral                  

                          37.5 mg  capsule,extended release 24hr                

    completed             

                                              for 30 day(s)                

 

                Effexor XR                   575189                   RxNorm    

               

2020                   oral                  

                          150 mg  capsule,extended release 24hr                 

   completed              

                                              for 30 day(s)                

 

                venlafaxine                   592791                   RxNorm   

                

2020                   oral                  

                          37.5 mg  capsule,extended release 24hr                

    completed             

                                              for 30 day(s)                

 

                    hydroxyzine HCl                   884572                   R

xNorm               

                    2020                   or

al               

                    25 mg  tablet                    completed                  

       for 45 

day(s)                

 

                Effexor XR                   606779                   RxNorm    

               

2018                   oral                  

                          75 mg  capsule,extended release 24hr                  

  completed               

                                              for 30 day(s)                

 

                Effexor XR                   019115                   RxNorm    

               

2018                   oral                  

                          75 mg  capsule,extended release 24hr                  

  completed               

                                                             

 

                trazodone                   110579                   RxNorm     

              

2020                   2020-02-15                   oral                  

                    50 mg  tablet                    completed                  

       for 30 day(s)

               

 

                Effexor XR                   720009                   RxNorm    

               

2018                   oral                  

                          75 mg  capsule,extended release 24hr                  

  completed               

                                                             

 

                venlafaxine                   760704                   RxNorm   

                

2020                   oral                  

                          37.5 mg  capsule,extended release 24hr                

    completed             

                                              for 30 day(s)                

 

                Effexor XR                   445617                   RxNorm    

               

2019                   oral                  

                          37.5 mg  capsule,extended release 24hr                

    completed             

                                              for 30 day(s)                

 

                    Wellbutrin XL                   342079                   RxN

orm                 

                    2019                   or

al                 

                          300 mg  tablet extended release 24 hr                 

   completed             

                                              for 30 day(s)                

 

                Remeron                   473502                   RxNorm       

            

2021                   oral                  

                    15 mg  tablet                    completed                  

       for 30 day(s)

               

 

                    hydroxyzine HCl                   340308                   R

xNorm               

                    2020                   or

al               

                    25 mg  tablet                    completed                  

       for 30 

day(s)                

 

                trazodone                   536811                   RxNorm     

              

2020                   oral                  

                    50 mg  tablet                    completed                  

       for 30 day(s)

               

 

                Effexor XR                   318573                   RxNorm    

               

2020                   oral                  

                          37.5 mg  capsule,extended release 24hr                

    completed             

                                              for 30 day(s)                

 

                Effexor XR                   340327                   RxNorm    

               

2021                   2021-02-15                   oral                  

                          75 mg  capsule,extended release 24hr                  

  completed               

                                              for 30 day(s)                

 

                    hydroxyzine HCl                   131808                   R

xNorm               

                    2019                   or

al               

                    25 mg  tablet                    completed                  

       for 30 

day(s)                

 

                Effexor XR                   625672                   RxNorm    

               

2020-10-27                   2020                   oral                  

                          37.5 mg  capsule,extended release 24hr                

    completed             

                                              for 30 day(s)                

 

                Effexor XR                   946142                   RxNorm    

               

2019                   oral                  

                          150 mg  capsule,extended release 24hr                 

   completed              

                                              for 30 day(s)                

 

                venlafaxine                   937314                   RxNorm   

                

2020                   oral                  

                          150 mg  capsule,extended release 24hr                 

   completed              

                                              for 30 day(s)                

 

                Remeron                   316920                   RxNorm       

            

2021                   oral                  

                    15 mg  tablet                    active                     

    for 30 day(s)   

            

 

                Effexor XR                   474033                   RxNorm    

               

2018                   oral                  

                          37.5 mg  capsule,extended release 24hr                

    completed             

                                                             

 

                buspirone                   496919                   RxNorm     

              

2019                   oral                  

                    15 mg  tablet                    completed                  

       for 90 day(s)

               

 

                Effexor XR                   290218                   RxNorm    

               

2020                   oral                  

                          37.5 mg  capsule,extended release 24hr                

    completed             

                                              for 30 day(s)                

 

                venlafaxine                   727181                   RxNorm   

                

2020                   oral                  

                          37.5 mg  capsule,extended release 24hr                

    completed             

                                              for 30 day(s)                

 

                Effexor XR                   219228                   RxNorm    

               

2018                   oral                  

                          37.5 mg  capsule,extended release 24hr                

    completed             

                                                             

 

                venlafaxine                   412519                   RxNorm   

                

2020                   oral                  

                          37.5 mg  capsule,extended release 24hr                

    completed             

                                              for 30 day(s)                

 

                venlafaxine                   032354                   RxNorm   

                

2019                   oral                  

                          37.5 mg  capsule,extended release 24hr                

    completed             

                                              for 30 day(s)                

 

                Effexor XR                   007224                   RxNorm    

               

2018                   oral                  

                          75 mg  capsule,extended release 24hr                  

  completed               

                                              for 30 day(s)                



                                                                                
                                                                                
                                                                                
                                                                                
                                                                                
                                                                                
                                                                                
                                                                                
                                                                                
                                                                                
    



Problems

                      



                    Problem Name                   Code                   CodeSy

stem                

                    Alternate Code                   Alternate CodeSystem       

            Start 

Date                   End Date                   Status                   

Narrative                

 

                     Generalized anxiety disorder                   [id number]    

               

SNOMED-CT                                                           2021  

  

                                        Active                                  

 

 

                           Recurrent depressive disorder, current episode modera

te                   

[id number]                   SNOMED-CT                                           

 

                    2017                                       Active     

        

                                                        



                                                                                
                                           



Relevant diagnostic tests/laboratory data Narrative

          No Information                                                        
                 



Procedures

                      



                    Procedure Name                   Code                   Code

System              

                    Target Site                   Date of Procedure             

      Status    

                          Service Delivery Location                   Device Cod

e           

                          Device Name                   Device UID              

  

 

                          Psychotherapy, 45 minutes with patient                

   [id number]               

                    SNOMED-CT                    ()                   2017

                 

                          completed                   [address], [id number]  [id number]                                            

       

                                                        

 

                          Psychotherapy, 45 minutes with patient                

   [id number]               

                    SNOMED-CT                    ()                   2017

                 

                          completed                   [address], [id number]  [id number]                                            

       

                                                        

 

                          Psychotherapy, 45 minutes with patient                

   [id number]               

                    SNOMED-CT                    ()                   2017

                 

                          completed                   [address], [id number]  [id number]                                            

       

                                                        

 

                          Psychotherapy, 45 minutes with patient                

   [id number]               

                    SNOMED-CT                    ()                   2017-10-16

                 

                          completed                   [facility], [id number]  [id number]                                            

       

                                                        

 

                          Psychotherapy, 45 minutes with patient                

   [id number]               

                    SNOMED-CT                    ()                   2017-10-31

                 

                          completed                   [address], [id number]  [id number]                                            

       

                                                        

 

                          Psychotherapy, 45 minutes with patient                

   [id number]               

                    SNOMED-CT                    ()                   2017

                 

                          completed                   [address], [id number]  [id number]                                            

       

                                                        

 

                          Psychotherapy, 45 minutes with patient                

   [id number]               

                    SNOMED-CT                    ()                   2021

                 

                          completed                   [address], [id number]  [id number]                                            

       

                                                        

 

                          Psychotherapy, 45 minutes with patient                

   [id number]               

                    SNOMED-CT                    ()                   2021

                 

                          completed                   [address], [id number]  [id number]                                            

       

                                                        

 

                          Psychotherapy, 45 minutes with patient                

   [id number]               

                    SNOMED-CT                    ()                   2021

                 

                          completed                   [address], [id number]  [id number]                                            

       

                                                        

 

                          Psychotherapy, 45 minutes with patient                

   [id number]               

                    SNOMED-CT                    ()                   2021

                 

                          completed                   [address], [id number]  [id number]                                            

       

                                                        

 

                          Psychotherapy, 45 minutes with patient                

   [id number]               

                    SNOMED-CT                    ()                   2021

                 

                          completed                   [address], [id number]  [id number]                                            

       

                                                        

 

                          Psychotherapy, 45 minutes with patient                

   [id number]               

                    SNOMED-CT                    ()                   2021

                 

                          completed                   [address], [id number]  [id number]                                            

       

                                                        

 

                          Psychotherapy, 45 minutes with patient                

   [id number]               

                    SNOMED-CT                    ()                   2021

                 

                          completed                   [address], [id number]  [id number]                                            

       

                                                        

 

                          Psychotherapy, 45 minutes with patient                

   [id number]               

                    SNOMED-CT                    ()                   2021

                 

                          completed                   [address], [id number]  [id number]                                            

       

                                                        

 

                          Psychotherapy, 45 minutes with patient                

   [id number]               

                    SNOMED-CT                    ()                   2021

                 

                          completed                   [address], [id number]  [id number]                                            

       

                                                        

 

                          Psychotherapy, 45 minutes with patient                

   [id number]               

                    SNOMED-CT                    ()                   2021

                 

                          completed                   [address], [id number]  [id number]                                            

       

                                                        

 

                          Psychotherapy, 45 minutes with patient                

   [id number]               

                    SNOMED-CT                    ()                   2021

                 

                          completed                   [address], [id number]  [id number]                                            

       

                                                        

 

                          Psychotherapy, 45 minutes with patient                

   [id number]               

                    SNOMED-CT                    ()                   2021

                 

                          completed                   [address], [id number]  [id number]                                            

       

                                                        

 

                          Psychotherapy, 45 minutes with patient                

   [id number]               

                    SNOMED-CT                    ()                   2021

                 

                          completed                   [address], [id number]  [id number]                                            

       

                                                        

 

                          Psychotherapy, 45 minutes with patient                

   [id number]               

                    SNOMED-CT                    ()                   2021

                 

                          completed                   [address], [id number]  [id number]                                            

       

                                                        

 

                          Psychotherapy, 45 minutes with patient                

   [id number]               

                    SNOMED-CT                    ()                   2021

                 

                          completed                   [address], [id number]  [id number]                                            

       

                                                        

 

                          Psychotherapy, 45 minutes with patient                

   [id number]               

                    SNOMED-CT                    ()                   2020-10-08

                 

                          completed                   [address], [id number]  [id number]                                            

       

                                                        

 

                          Psychotherapy, 45 minutes with patient                

   [id number]               

                    SNOMED-CT                    ()                   2020-10-29

                 

                          completed                   [address], [id number]  [id number]                                            

       

                                                        

 

                          Psychotherapy, 45 minutes with patient                

   [id number]               

                    SNOMED-CT                    ()                   2020

                 

                          completed                   [address], [id number]  [id number]                                            

       

                                                        

 

                          Psychotherapy, 45 minutes with patient                

   [id number]               

                    SNOMED-CT                    ()                   2020

                 

                          completed                   [address], [id number]  [id number]                                            

       

                                                        

 

                          Psychotherapy, 45 minutes with patient                

   [id number]               

                    SNOMED-CT                    ()                   2020

                 

                          completed                   [address], [id number]  [id number]                                            

       

                                                        

 

                          Psychotherapy, 45 minutes with patient                

   [id number]               

                    SNOMED-CT                    ()                   2020

                 

                          completed                   [address], [id number]  [id number]                                            

       

                                                        

 

                          Psychotherapy, 45 minutes with patient                

   [id number]               

                    SNOMED-CT                    ()                   2020

                 

                          completed                   [address], [id number]  [id number]                                            

       

                                                        

 

                          Psychotherapy, 45 minutes with patient                

   [id number]               

                    SNOMED-CT                    ()                   2020

                 

                          completed                   [address], [id number]  [id number]                                            

       

                                                        

 

                          Psychotherapy, 45 minutes with patient                

   [id number]               

                    SNOMED-CT                    ()                   2020

                 

                          completed                   [address], [id number]  [id number]                                            

       

                                                        

 

                          Psychotherapy, 45 minutes with patient                

   [id number]               

                    SNOMED-CT                    ()                   2020

                 

                          completed                   [address], [id number]  [id number]                                            

       

                                                        

 

                          Psychotherapy, 45 minutes with patient                

   [id number]               

                    SNOMED-CT                    ()                   2020

                 

                          completed                   [address], [id number]  [id number]                                            

       

                                                        

 

                          Psychotherapy, 45 minutes with patient                

   [id number]               

                    SNOMED-CT                    ()                   2020

                 

                          completed                   [address], [id number]  [id number]                                            

       

                                                        

 

                          Psychotherapy, 45 minutes with patient                

   [id number]               

                    SNOMED-CT                    ()                   2019

                 

                          completed                   [address], [id number]  [id number]                                            

       

                                                        

 

                          Psychotherapy, 45 minutes with patient                

   [id number]               

                    SNOMED-CT                    ()                   2019-10-22

                 

                          completed                   [address], [id number]  [id number]                                            

       

                                                        

 

                          Psychotherapy, 45 minutes with patient                

   [id number]               

                    SNOMED-CT                    ()                   2019

                 

                          completed                   [address], [id number]  [id number]                                            

       

                                                        

 

                          Psychotherapy, 45 minutes with patient                

   [id number]               

                    SNOMED-CT                    ()                   2020

                 

                          completed                   [address], [id number]  [phone]                                            

       

                                                        

 

                          Psychotherapy, 45 minutes with patient                

   [id number]               

                    SNOMED-CT                    ()                   2020

                 

                          completed                   [address], [id number]  [id number]                                            

       

                                                        

 

                          Psychotherapy, 45 minutes with patient                

   [id number]               

                    SNOMED-CT                    ()                   2020

                 

                          completed                   [address], [id number]  [id number]                                            

       

                                                        

 

                          Psychotherapy, 45 minutes with patient                

   [id number]               

                    SNOMED-CT                    ()                   2019

                 

                          completed                   [address], [id number] 

[id number]                                                                      

  

  

 

                          Psychotherapy, 45 minutes with patient                

   [id number]               

                    SNOMED-CT                    ()                   2019-06-10

                 

                          completed                   [address], [id number]  [id number]                                            

       

                                                        

 

                          Psychotherapy, 45 minutes with patient                

   [id number]               

                    SNOMED-CT                    ()                   2019

                 

                          completed                   [address], [id number]  [id number]                                            

       

                                                        

 

                          Psychotherapy, 45 minutes with patient                

   [id number]               

                    SNOMED-CT                    ()                   2019

                 

                          completed                   [address], [id number]  [id number]                                            

       

                                                        

 

                          Psychotherapy, 45 minutes with patient                

   [id number]               

                    SNOMED-CT                    ()                   2019

                 

                          completed                   [address], [id number]  [id number]                                            

       

                                                        

 

                          Psychotherapy, 45 minutes with patient                

   [id number]               

                    SNOMED-CT                    ()                   2019

                 

                          completed                   [address], [id number]  [id number]                                            

       

                                                        

 

                          Psychotherapy, 45 minutes with patient                

   [id number]               

                    SNOMED-CT                    ()                   2019

                 

                          completed                   [address], [id number]  [id number]                                            

       

                                                        

 

                          Psychotherapy, 45 minutes with patient                

   [id number]               

                    SNOMED-CT                    ()                   2019

                 

                          completed                   [address], [id number]  [id number]                                            

       

                                                        

 

                          Psychotherapy, 45 minutes with patient                

   [id number]               

                    SNOMED-CT                    ()                   2019

                 

                          completed                   [address], [id number]  [id number]                                            

       

                                                        

 

                          Psychotherapy, 45 minutes with patient                

   [id number]               

                    SNOMED-CT                    ()                   2019-03-15

                 

                          completed                   [address], [id number]  [id number]                                            

       

                                                        

 

                          Psychotherapy, 45 minutes with patient                

   [id number]               

                    SNOMED-CT                    ()                   2019-04-15

                 

                          completed                   [address], [id number]  [id number]                                            

       

                                                        

 

                          Psychotherapy, 45 minutes with patient                

   [id number]               

                    SNOMED-CT                    ()                   2019

                 

                          completed                   [address], [id number]  [id number]                                            

       

                                                        

 

                          Psychotherapy, 45 minutes with patient                

   [id number]               

                    SNOMED-CT                    ()                   2018

                 

                          completed                   [address], [id number]  [id number]                                            

       

                                                        

 

                          Psychotherapy, 45 minutes with patient                

   [id number]               

                    SNOMED-CT                    ()                   2018

                 

                          completed                   [address], [id number]  [id number]                                            

       

                                                        

 

                          Psychotherapy, 45 minutes with patient                

   [id number]               

                    SNOMED-CT                    ()                   2018-10-16

                 

                          completed                   [address], [id number]  [id number]                                            

       

                                                        

 

                          Psychotherapy, 45 minutes with patient                

   [id number]               

                    SNOMED-CT                    ()                   2018

                 

                          completed                   [address], [id number]  [id number]                                            

       

                                                        

 

                          Psychotherapy, 45 minutes with patient                

   [id number]               

                    SNOMED-CT                    ()                   2018-12-10

                 

                          completed                   [address], [id number]  [id number]                                            

       

                                                        

 

                          Psychotherapy, 45 minutes with patient                

   [id number]               

                    SNOMED-CT                    ()                   2018

                 

                          completed                   [address], [id number]  [id number]                                            

       

                                                        

 

                          Psychotherapy, 45 minutes with patient                

   [id number]               

                    SNOMED-CT                    ()                   2018

                 

                          completed                   [address], [id number]  [id number]                                            

       

                                                        

 

                          Psychotherapy, 45 minutes with patient                

   [id number]               

                    SNOMED-CT                    ()                   2018

                 

                          completed                   [address], [id number]  [id number]                                            

       

                                                        

 

                          Psychotherapy, 45 minutes with patient                

   [id number]               

                    SNOMED-CT                    ()                   2018

                 

                          completed                   [address], [id number]  [id number]                                            

       

                                                        

 

                          Psychotherapy, 45 minutes with patient                

   [id number]               

                    SNOMED-CT                    ()                   2018

                 

                          completed                   [address], [id number]  [id number]                                            

       

                                                        

 

                          Psychotherapy, 45 minutes with patient                

   [id number]               

                    SNOMED-CT                    ()                   2018

                 

                          completed                   [address], [id number]  [id number]                                            

       

                                                        

 

                          Psychotherapy, 45 minutes with patient                

   [id number]               

                    SNOMED-CT                    ()                   2018

                 

                          completed                   [address], [id number]  [id number]                                            

       

                                                        

 

                          Psychotherapy, 45 minutes with patient                

   [id number]               

                    SNOMED-CT                    ()                   2017

                 

                          completed                   [address], [id number]  [id number]                                            

       

                                                        

 

                          Psychotherapy, 45 minutes with patient                

   [id number]               

                    SNOMED-CT                    ()                   2017

                 

                          completed                   [address], [id number]  [id number]                                            

       

                                                        

 

                          Psychotherapy, 45 minutes with patient                

   [id number]               

                    SNOMED-CT                    ()                   2018

                 

                          completed                   [address], [id number]  [id number]                                            

       

                                                        

 

                          Psychotherapy, 45 minutes with patient                

   [id number]               

                    SNOMED-CT                    ()                   2018

                 

                          completed                   [address], [id number]  [id number]                                            

       

                                                        

 

                          Psychotherapy, 45 minutes with patient                

   [id number]               

                    SNOMED-CT                    ()                   2018

                 

                          completed                   [address], [id number]  [id number]                                            

       

                                                        

 

                          Psychotherapy, 45 minutes with patient                

   [id number]               

                    SNOMED-CT                    ()                   2018

                 

                          completed                   [address], [id number]  [id number]                                            

       

                                                        

 

                    Initial Psychiatric Evaluation                   [id number]  

                 

SNOMED-CT                    ()                   2017                   

completed                               [address], [id number]  [id number]                                            

       

                                                        

 

                          Health Monitoring / Risk Reduction Counseling - [facility]

ed                   

[id number]                   SNOMED-CT                    ()                   

2017                   completed                   [address], [id number]  [id number]                   

                                                                    

 

                    Est. Patient - E&M Intermediate                   [id number] 

                  

SNOMED-CT                    ()                   2018                   

completed                               [address], [id number]  [id number]                                            

       

                                                        

 

                    Est. Patient - E&M Intermediate                   [id number] 

                  

SNOMED-CT                    ()                   2018                   

completed                               [address], [id number]  [id number]                                            

       

                                                        

 

                    Est. Patient - E&M Intermediate                   [id number] 

                  

SNOMED-CT                    ()                   2019-01-15                   

completed                               [address], [id number]  [id number]                                            

       

                                                        

 

                    Est. Patient - E&M Intermediate                   [id number] 

                  

SNOMED-CT                    ()                   2019                   

completed                               [address], [id number]  [id number]                                            

       

                                                        

 

                    Est. Patient - E&M Intermediate                   [id number] 

                  

SNOMED-CT                    ()                   2019                   

completed                               [address], [id number]  [id number]                                            

       

                                                        

 

                    Est. Patient - E&M Intermediate                   [id number] 

                  

SNOMED-CT                    ()                   2019                   

completed                               [address], [id number]  [id number]                                            

       

                                                        

 

                    Est. Patient - E&M Intermediate                   [id number] 

                  

SNOMED-CT                    ()                   2020                   

completed                               [address], [id number]  [id number]                                            

       

                                                        

 

                    Est. Patient - E&M Intermediate                   [id number] 

                  

SNOMED-CT                    ()                   2020                   

completed                               [address], [id number]  [id number]                                            

       

                                                        

 

                    Est. Patient - E&M Intermediate                   [id number] 

                  

SNOMED-CT                    ()                   2020                   

completed                               [address], [id number]  [id number]                                            

       

                                                        

 

                    Est. Patient - E&M Intermediate                   [id number] 

                  

SNOMED-CT                    ()                   2019                   

completed                               [address], [id number]  [id number]                                            

       

                                                        

 

                    Est. Patient - E&M Intermediate                   [id number] 

                  

SNOMED-CT                    ()                   2019                   

completed                               [address], [id number]  [id number]                                            

       

                                                        

 

                    Est. Patient - E&M Intermediate                   [id number] 

                  

SNOMED-CT                    ()                   2019                   

completed                               [address], [id number]  [id number]                                            

       

                                                        

 

                    Est. Patient - E&M Intermediate                   [id number] 

                  

SNOMED-CT                    ()                   2019                   

completed                               [address], [id number]  [id number]                                            

       

                                                        

 

                    Est. Patient - E&M Intermediate                   [id number] 

                  

SNOMED-CT                    ()                   2019-10-08                   

completed                               [address], [id number]  [id number]                                            

       

                                                        

 

                    Est. Patient - E&M Intermediate                   [id number] 

                  

SNOMED-CT                    ()                   2019                   

completed                               [address], [id number]  [id number]                                            

       

                                                        

 

                    Est. Patient - E&M Brief                   [id number]        

           SNOMED-CT

                     ()                   2018                   completed

  

                                        [address], 

[id number]  [id number]                                                           

  

             

 

                    Est. Patient - E&M Brief                   [id number]        

           SNOMED-CT

                     ()                   2019                   completed

  

                                        [address], 

[id number]  [id number]                                                           

  

             

 

                    Est. Patient - E&M Brief                   [id number]        

           SNOMED-CT

                     ()                   2020                   completed

  

                                        [address], 

[id number]  [id number]                                                           

  

             

 

                    Est. Patient - E&M Brief                   [id number]        

           SNOMED-CT

                     ()                   2020                   completed

  

                                        [address], 

[id number]  [id number]                                                           

  

             

 

                    Est. Patient - E&M Brief                   [id number]        

           SNOMED-CT

                     ()                   2020                   completed

  

                                        [address], 

[id number]  [id number]                                                           

  

             

 

                    Est. Patient - E&M Brief                   [id number]        

           SNOMED-CT

                     ()                   2020                   completed

  

                                        [address], [place], 

[id number]  [id number]                                                           

  

             

 

                    Est. Patient - E&M Brief                   [id number]        

           SNOMED-CT

                     ()                   2020                   completed

  

                                        [address], 

[id number]  [id number]                                                           

  

             

 

                    Est. Patient - E&M Brief                   [id number]        

           SNOMED-CT

                     ()                   2021                   completed

  

                                        [address], 

[id number]  [id number]                                                           

  

             

 

                    Est. Patient - E&M Brief                   [id number]        

           SNOMED-CT

                     ()                   2021-02-15                   completed

  

                                        [address], 

[id number]  [id number]                                                           

  

             

 

                    Est. Patient - E&M Expanded                   [id number]     

              

SNOMED-CT                    ()                   2018                   

completed                               [address], [id number]  [id number]                                            

       

                                                        

 

                    Est. Patient - E&M Expanded                   [id number]     

              

SNOMED-CT                    ()                   2018                   

completed                               [address], [id number]  [id number]                                            

       

                                                        

 

                    Est. Patient - E&M Expanded                   [id number]     

              

SNOMED-CT                    ()                   2018                   

completed                               [address], [id number]  [id number]                                            

       

                                                        

 

                    Est. Patient - E&M Expanded                   [id number]     

              

SNOMED-CT                    ()                   2018                   

completed                               [address], [id number]  [id number]                                            

       

                                                        

 

                    Est. Patient - E&M Expanded                   [id number]     

              

SNOMED-CT                    ()                   2018                   

completed                               [address], [id number]  [id number]                                            

       

                                                        

 

                    Est. Patient - E&M Expanded                   [id number]     

              

SNOMED-CT                    ()                   2018                   

completed                               [address], [id number]  [id number]                                            

       

                                                        

 

                    Est. Patient - E&M Expanded                   [id number]     

              

SNOMED-CT                    ()                   2018                   

completed                               [address], [id number]  [id number]                                            

       

                                                        

 

                    Est. Patient - E&M Expanded                   [id number]     

              

SNOMED-CT                    ()                   2021                   

completed                               [address], [id number]  [id number]                                            

       

                                                        

 

                    Est. Patient - E&M Expanded                   [id number]     

              

SNOMED-CT                    ()                   2021                   

completed                               [address], [id number]  [id number]                                            

       

                                                        

 

                    Est. Patient - E&M Expanded                   [id number]     

              

SNOMED-CT                    ()                   2021                   

completed                               [address], [id number]  [id number]                                            

       

                                                        

 

                    Est. Patient - E&M Expanded                   [id number]     

              

SNOMED-CT                    ()                   2021                   

completed                               [address], [id number]  [id number]                                            

       

                                                        

 

                    Individual Psychotherapy                   [id number]         

          SNOMED-CT 

                     ()                   2018                   completed

   

                                        [address], 

[id number]  [id number]                                                           

  

             

 

                    Individual Psychotherapy                   [id number]         

          SNOMED-CT 

                     ()                   2020                   completed

   

                                        [address], 

[id number]  [id number]                                                           

  

             

 

                    Individual Psychotherapy                   [id number]         

          SNOMED-CT 

                     ()                   2021                   completed

   

                                        [address], 

[id number]  [id number]                                                           

  

             

 

                    Individual Psychotherapy                   [id number]         

          SNOMED-CT 

                     ()                   2021-02-10                   completed

   

                                        [address], 

[id number]  [id number]                                                           

  

             

 

                    Individual Psychotherapy                   [id number]         

          SNOMED-CT 

                     ()                   2017                   completed

   

                                        [address] [place], [place], 

[id number]  [id number]                                                           

  

             

 

                    Individual Psychotherapy                   [id number]         

          SNOMED-CT 

                     ()                   2017                   completed

   

                                        [address], 

[id number]  [id number]                                                           

  

             

 

                    Individual Psychotherapy                   [id number]         

          SNOMED-CT 

                     ()                   2017                   completed

   

                                        [address], 

[id number]  [id number]                                                           

  

             

 

                    Individual Psychotherapy                   [id number]         

          SNOMED-CT 

                     ()                   2017-10-16                   completed

   

                                        [address], 

[id number]  [id number]                                                           

  

             

 

                    Individual Psychotherapy                   [id number]         

          SNOMED-CT 

                     ()                   2017-10-31                   completed

   

                                        [address], 

[id number]  [id number]                                                           

  

             

 

                    Individual Psychotherapy                   [id number]         

          SNOMED-CT 

                     ()                   2017                   completed

   

                                        [address], 

[id number]  [id number]                                                           

  

             

 

                    Individual Psychotherapy                   [id number]         

          SNOMED-CT 

                     ()                   2021                   completed

   

                                        [address], 

[id number]  [id number]                                                           

  

             

 

                    Individual Psychotherapy                   [id number]         

          SNOMED-CT 

                     ()                   2021                   completed

   

                                        [address], 

[id number]  [id number]                                                           

  

             

 

                    Individual Psychotherapy                   [id number]         

          SNOMED-CT 

                     ()                   2021                   completed

   

                                        [address], 

[id number]  [id number]                                                           

  

             

 

                    Individual Psychotherapy                   [id number]         

          SNOMED-CT 

                     ()                   2021                   completed

   

                                        [address], 

[id number]  [id number]                                                           

  

             

 

                    Individual Psychotherapy                   [id number]         

          SNOMED-CT 

                     ()                   2021                   completed

   

                                        [address], 

[id number]  [id number]                                                           

  

             

 

                    Individual Psychotherapy                   [id number]         

          SNOMED-CT 

                     ()                   2021                   completed

   

                                        [address], 

[id number]  [id number]                                                           

  

             

 

                    Individual Psychotherapy                   [id number]         

          SNOMED-CT 

                     ()                   2021                   completed

   

                                        [address], 

[id number]  [id number]                                                           

  

             

 

                    Individual Psychotherapy                   [id number]         

          SNOMED-CT 

                     ()                   2021                   completed

   

                                        [address], 

[id number]  [id number]                                                           

  

             

 

                    Individual Psychotherapy                   [id number]         

          SNOMED-CT 

                     ()                   2021                   completed

   

                                        [address], 

[id number]  [id number]                                                           

  

             

 

                    Individual Psychotherapy                   [id number]         

          SNOMED-CT 

                     ()                   2021                   completed

   

                                        [address], 

[id number]  [id number]                                                           

  

             

 

                    Individual Psychotherapy                   [id number]         

          SNOMED-CT 

                     ()                   2021                   completed

   

                                        [address], 

[id number]  [id number]                                                           

  

             

 

                    Individual Psychotherapy                   [id number]         

          SNOMED-CT 

                     ()                   2021                   completed

   

                                        [address], 

[id number]  [id number]                                                           

  

             

 

                    Individual Psychotherapy                   [id number]         

          SNOMED-CT 

                     ()                   2021                   completed

   

                                        [address], 

[id number]  [id number]                                                           

  

             

 

                    Individual Psychotherapy                   [id number]         

          SNOMED-CT 

                     ()                   2021                   completed

   

                                        [address], 

[id number]  [id number]                                                           

  

             

 

                    Individual Psychotherapy                   [id number]         

          SNOMED-CT 

                     ()                   2021                   completed

   

                                        [address], 

[id number]  [id number]                                                           

  

             

 

                    Individual Psychotherapy                   [id number]         

          SNOMED-CT 

                     ()                   2020-10-08                   completed

   

                                        [address], 

[id number]  [id number]                                                           

  

             

 

                    Individual Psychotherapy                   [id number]         

          SNOMED-CT 

                     ()                   2020-10-29                   completed

   

                                        [address], 

[id number]  [id number]                                                           

  

             

 

                    Individual Psychotherapy                   [id number]         

          SNOMED-CT 

                     ()                   2020                   completed

   

                                        [address], 

[id number]  [id number]                                                           

  

             

 

                    Individual Psychotherapy                   [id number]         

          SNOMED-CT 

                     ()                   2020                   completed

   

                                        [address], 

[id number]  [id number]                                                           

  

             

 

                    Individual Psychotherapy                   [id number]         

          SNOMED-CT 

                     ()                   2020                   completed

   

                                        [address], 

[id number]  [id number]                                                           

  

             

 

                    Individual Psychotherapy                   [id number]         

          SNOMED-CT 

                     ()                   2020                   completed

   

                                        [address], 

[id number]  [id number]                                                           

  

             

 

                    Individual Psychotherapy                   [id number]         

          SNOMED-CT 

                     ()                   2020                   completed

   

                                        [address], 

[id number]  [id number]                                                           

  

             

 

                    Individual Psychotherapy                   [id number]         

          SNOMED-CT 

                     ()                   2020                   completed

   

                                        [address], 

[id number]  [id number]                                                           

  

             

 

                    Individual Psychotherapy                   [id number]         

          SNOMED-CT 

                     ()                   2020                   completed

   

                                        [address], 

[id number]  [id number]                                                           

  

             

 

                    Individual Psychotherapy                   [id number]         

          SNOMED-CT 

                     ()                   2020                   completed

   

                                        [address], 

[id number]  [id number]                                                           

  

             

 

                    Individual Psychotherapy                   [id number]         

          SNOMED-CT 

                     ()                   2020                   completed

   

                                        [facility], 

[id number]  [id number]                                                           

  

             

 

                    Individual Psychotherapy                   [id number]         

          SNOMED-CT 

                     ()                   2020                   completed

   

                                        [address], 

[id number]  [id number]                                                           

  

             

 

                    Individual Psychotherapy                   [id number]         

          SNOMED-CT 

                     ()                   2019                   completed

   

                                        [address], 

[id number]  [id number]                                                           

  

             

 

                    Individual Psychotherapy                   [id number]         

          SNOMED-CT 

                     ()                   2019-10-22                   completed

   

                                        [address], 

[id number]  [id number]                                                           

  

             

 

                    Individual Psychotherapy                   [id number]         

          SNOMED-CT 

                     ()                   2019                   completed

   

                                        [address], 

[id number]  [id number]                                                           

  

             

 

                    Individual Psychotherapy                   [id number]         

          SNOMED-CT 

                     ()                   2020                   completed

   

                                        [address], 

[id number]  [id number]                                                           

  

             

 

                    Individual Psychotherapy                   [id number]         

          SNOMED-CT 

                     ()                   2020                   completed

   

                                        [address], 

[id number]  [id number]                                                           

  

             

 

                    Individual Psychotherapy                   [id number]         

          SNOMED-CT 

                     ()                   2020                   completed

   

                                        [address], 

[id number]  [id number]                                                           

  

             

 

                    Individual Psychotherapy                   [id number]         

          SNOMED-CT 

                     ()                   2019                   completed

   

                                        [address],

 [id number]  [id number]   

                                                                            

 

                    Individual Psychotherapy                   [id number]         

          SNOMED-CT 

                     ()                   2019-06-10                   completed

   

                                        [address], 

[id number]  [id number]                                                           

  

             

 

                    Individual Psychotherapy                   [id number]         

          SNOMED-CT 

                     ()                   2019                   completed

   

                                        [address], 

[id number]  [id number]                                                           

  

             

 

                    Individual Psychotherapy                   [id number]         

          SNOMED-CT 

                     ()                   2019                   completed

   

                                        [address], 

[id number]  [id number]                                                           

  

             

 

                    Individual Psychotherapy                   [id number]         

          SNOMED-CT 

                     ()                   2019                   completed

   

                                        [address], 

[id number]  [id number]                                                           

  

             

 

                    Individual Psychotherapy                   [id number]         

          SNOMED-CT 

                     ()                   2019                   completed

   

                                        [address], 

[id number]  [id number]                                                           

  

             

 

                    Individual Psychotherapy                   [id number]         

          SNOMED-CT 

                     ()                   2019                   completed

   

                                        [address], 

[id number]  [id number]                                                           

  

             

 

                    Individual Psychotherapy                   [id number]         

          SNOMED-CT 

                     ()                   2019                   completed

   

                                        [address], 

[id number]  [id number]                                                           

  

             

 

                    Individual Psychotherapy                   [id number]         

          SNOMED-CT 

                     ()                   2019                   completed

   

                                        [address], 

[id number]  [id number]                                                           

  

             

 

                    Individual Psychotherapy                   [id number]         

          SNOMED-CT 

                     ()                   2019-03-15                   completed

   

                                        [address], 

[id number]  [id number]                                                           

  

             

 

                    Individual Psychotherapy                   [id number]         

          SNOMED-CT 

                     ()                   2019-04-15                   completed

   

                                        [address], 

[id number]  [id number]                                                           

  

             

 

                    Individual Psychotherapy                   [id number]         

          SNOMED-CT 

                     ()                   2019                   completed

   

                                        [address], 

[id number]  [id number]                                                           

  

             

 

                    Individual Psychotherapy                   [id number]         

          SNOMED-CT 

                     ()                   2018                   completed

   

                                        [address], 

[id number]  [id number]                                                           

  

             

 

                    Individual Psychotherapy                   [id number]         

          SNOMED-CT 

                     ()                   2018                   completed

   

                                        [facility] [address], 

[id number]  [id number]                                                           

  

             

 

                    Individual Psychotherapy                   [id number]         

          SNOMED-CT 

                     ()                   2018-10-16                   completed

   

                                        [address], 

[id number]  [id number]                                                           

  

             

 

                    Individual Psychotherapy                   [id number]         

          SNOMED-CT 

                     ()                   2018                   completed

   

                                        [address], 

[id number]  [id number]                                                           

  

             

 

                    Individual Psychotherapy                   [id number]         

          SNOMED-CT 

                     ()                   2018-12-10                   completed

   

                                        [address], 

[id number]  [id number]                                                           

  

             

 

                    Individual Psychotherapy                   [id number]         

          SNOMED-CT 

                     ()                   2018                   completed

   

                                        [address], 

[id number]  [id number]                                                           

  

             

 

                    Individual Psychotherapy                   [id number]         

          SNOMED-CT 

                     ()                   2018                   completed

   

                                        [address], 

[id number]  [id number]                                                           

  

             

 

                    Individual Psychotherapy                   [id number]         

          SNOMED-CT 

                     ()                   2018                   completed

   

                                        [address], 

[id number]  [id number]                                                           

  

             

 

                    Individual Psychotherapy                   [id number]         

          SNOMED-CT 

                     ()                   2018                   completed

   

                                        [address], 

[id number]  [id number]                                                           

  

             

 

                    Individual Psychotherapy                   [id number]         

          SNOMED-CT 

                     ()                   2018                   completed

   

                                        [address], 

[id number]  [id number]                                                           

  

             

 

                    Individual Psychotherapy                   [id number]         

          SNOMED-CT 

                     ()                   2018                   completed

   

                                        [address], 

[id number]  [id number]                                                           

  

             

 

                    Individual Psychotherapy                   [id number]         

          SNOMED-CT 

                     ()                   2018                   completed

   

                                        [address], 

[id number]  [id number]                                                           

  

             

 

                    Individual Psychotherapy                   [id number]         

          SNOMED-CT 

                     ()                   2017                   completed

   

                                        [address], 

[id number]  [id number]                                                           

  

             

 

                    Individual Psychotherapy                   [id number]         

          SNOMED-CT 

                     ()                   2017                   completed

   

                                        [address], 

[id number]  [id number]                                                           

  

             

 

                    Individual Psychotherapy                   [id number]         

          SNOMED-CT 

                     ()                   2018                   completed

   

                                        [address], 

[id number]  [id number]                                                           

  

             

 

                    Individual Psychotherapy                   [id number]         

          SNOMED-CT 

                     ()                   2018                   completed

   

                                        [address], 

[id number]  [id number]                                                           

  

             

 

                    Individual Psychotherapy                   [id number]         

          SNOMED-CT 

                     ()                   2018                   completed

   

                                        [address], 

[id number]  [id number]                                                           

  

             

 

                    Individual Psychotherapy                   [id number]         

          SNOMED-CT 

                     ()                   2018                   completed

   

                                        [address], 

[id number]  [id number]                                                           

  

             

 

                          Psychiatric Diagnostic Evaluation without medical serv

ices                   

[id number]                   SNOMED-CT                    ()                   

2017                   completed                   [address], [id number]  [id number]                   

                                                                    

 

                                                      SNOMED-CT                 

   ()           

                    2021                   completed                   [address], [id number]  [id number]           
                                                                            

 

                                                      SNOMED-CT                 

   ()           

                    2017                   completed                   [address], [id number]  [id number]           
                                                                            

 

                                                      SNOMED-CT                 

   ()           

                    2017                   completed                   [address], [id number]  [id number]           
                                                                            

 

                                                      SNOMED-CT                 

   ()           

                    2017                   completed                   [address], [id number]  [id number]           
                                                                            

 

                                                      SNOMED-CT                 

   ()           

                    2017                   completed                   [address], [id number]  [id number]           
                                                                            

 

                                                      SNOMED-CT                 

   ()           

                    2017                   completed                   [address], 789697301  [id number]           
                                                                            

 

                                                      SNOMED-CT                 

   ()           

                    2017-10-16                   completed                   [address], [id number]  [id number]           
                                                                            

 

                                                      SNOMED-CT                 

   ()           

                    2017                   completed                   [address], [id number]  [id number]           
                                                                            

 

                                                      SNOMED-CT                 

   ()           

                    2017                   completed                   [address], [id number]  [id number]           
                                                                            

 

                                                      SNOMED-CT                 

   ()           

                    2017                   completed                   [address], [id number]  [id number]           
                                                                            

 

                                                      SNOMED-CT                 

   ()           

                    2017                   completed                   [address], [id number]  [id number]           
                                                                            

 

                                                      SNOMED-CT                 

   ()           

                    2017-10-31                   completed                   [address], 863691051  [id number]           
                                                                            

 

                                                      SNOMED-CT                 

   ()           

                    2021                   completed                   [address], 370081352  [id number]           
                                                                            

 

                                                      SNOMED-CT                 

   ()           

                    2021                   completed                   [address], [id number]  [id number]           
                                                                            

 

                                                      SNOMED-CT                 

   ()           

                    2021                   completed                   [address], 629284940  [id number]           
                                                                            

 

                                                      SNOMED-CT                 

   ()           

                    2021                   completed                   [address], [id number]  [id number]           
                                                                            

 

                                                      SNOMED-CT                 

   ()           

                    2021-02-10                   completed                   [address], [id number]  [id number]           
                                                                            

 

                                                      SNOMED-CT                 

   ()           

                    2021                   completed                   [address], 730303221  [id number]           
                                                                            

 

                                                      SNOMED-CT                 

   ()           

                    2021                   completed                   [facility]

[address], [id number]  [id number]           
                                                                            

 

                                                      SNOMED-CT                 

   ()           

                    2021                   completed                   [address], 784769782  [id number]           
                                                                            

 

                                                      SNOMED-CT                 

   ()           

                    2018                   completed                   [address], 812442468  [id number]           
                                                                            

 

                                                      SNOMED-CT                 

   ()           

                    2018                   completed                   [address], 562156313  [id number]           
                                                                            

 

                                                      SNOMED-CT                 

   ()           

                    2018                   completed                   [address], [id number]  [id number]           
                                                                            



                                                                                
                                                                                
                                                                                
                                                                                
                                                                                
                                                                                
                                                                                
                                                                                
                                                                                
                                                                                
                                                                                
                                                                                
                                                                                
                                                                                
                                                                                
                                                                                
                                                                                
                                                                                
                                                                                
                                                                                
                                                                                
                                                                                
                                                                                
                                                                                
                                                                                
                                                                                
        



Encounters/Encounter Diagnoses

                      



                    Encounter Name                   Encounter Code             

      Diagnosis Code

                          Diagnosis Name                   Diagnosis CodeSystem 

        

                          Date of Diagnosis                   Service Delivery L

ocation          

     

 

                          Telehealth Physchotherapy 30 Minutes with Patient     

              [id number]       

                          [id number]                   Recurrent depressive disor

dennise, current 

episode moderate                   SNOMED-CT                   2021       

                                        [facility]  [address] [place] [address], 

[id number]                  



                                                                                
                                 



Vital Signs

                      



                Code                   CodeSystem                   Vitals      

             

Date                                    Value                

 

                8302-2                   LOINC                   Height         

          

2020                              63 [in_i]                

 

                08246-9                   LOINC                   BMI           

        

2020                              41.45 (lb/in2)                

 

                    8480-6                   [facility]                   Blood Press

ure-Systolic        

                          2020                   90 mm[HG]                

 

                07531-0                   LOINC                   Weight        

           

2020                              234 [lb_av]                

 

                8867-4                   [facility]                   Heart Rate     

              

2020                              101 /min                

 

                    8462-4                   [facility]                   Blood Press

ure-Diastolic       

                          2020                   130 mm[HG]               

 



                                                                                
              



Social History

                      



                    Element Description                   Description           

        Start Date  

                    End Date                   Code                   CodeSystem

   

                                        AdditionalInfo                

 

                    SexAssignedAtBirth                   Female                 

  1970        

                                        F                   AdministrativeGender

          

                                                        



                                                                                
    



Hospital Discharge Instructions

                                    * 



                                                                                
                                    



Reason For Referral

                      





                                                                                
    



Medical Equipment

                                    *     FDA



                                                                                
                



Assessments

                                    * 



                                                                                
      



Goals Section

                      



                          Goals                     Planned DateTime            

    

 

                                        Kath will report that her level of dep

ression(irritability) is a 3 or less on 

a scale of 1 low and 10 high most days during the treatment period(90 days).    
                                        2017

## 2021-10-16 NOTE — REPVR
PROCEDURE INFORMATION: 

Exam: XR Left Tibia and Fibula 

Exam date and time: 10/16/2021 2:23 AM 

Age: 51 years old 

Clinical indication: Other: Fall 



TECHNIQUE: 

Imaging protocol: XR Left tibia and fibula. 

Views: 2 views. 



COMPARISON: 

No relevant prior studies available. 



FINDINGS: 

Bones/joints: Trimalleolar fracture/dislocation of the left ankle with lateral 

dislocation of the talus in respect to the distal tibia and fibula. Displaced 

and angulated fracture of the distal fibular metadiaphysis as well as displaced 

fractures involving the medial and posterior malleoli of the distal tibia. No 

other fractures. Mild degenerative change involving the left knee. 

Calcification along the medial aspect of the medial femoral condyle consistent 

with old MCL injury. 

Soft tissues: Generalized soft tissue swelling most pronounced about the ankle. 



IMPRESSION: 

Trimalleolar fracture/dislocation of the left ankle. 



Electronically signed by: Isrrael Stevenson On 10/16/2021  04:53:33 AM

## 2021-10-16 NOTE — CCD
Transition of Care

                             Created on: 2021



Kath Ken

External Reference #: [id number]

: 1970

Sex: Female



Demographics





                          Address                   [address] [place] [address]

 

                          Home Phone                [phone]

 

                          Preferred Language        English

 

                          Marital Status            Unknown

 

                          [place] Affiliation     Unknown

 

                          Race                      Unknown

 

                          Additional Race(s)        Unknown



 

                          Ethnic Group              Unknown





Author





                          Author                    Kath Flaherty

 

                          [facility]

 

                          Address                   Unknown

 

                          Phone                     Unavailable







Support





                Name            Relationship    Address         Phone

 

                    Next Of Kin         Unknown             Unavailable







Care Team Providers





                    Care Team Member Name Role                Phone

 

                    Yoseph Flaherty     PCP                 Unavailable



                                                            



Allergies, Adverse Reactions, Alerts

                      



                    Allergy Substance                   Code                   C

odeSystem           

                    Reaction                   Severity                   Critic

ality        

                          Status                    Start Date                

 

                metformin                                                       

   nausea: high 

blood sugar                   Moderate                                       

Active                                                  



                                                                                
             



Medications

                      



                    Medication                   Medication Code                

   Medication 

CodeSystem                   Start Date                   Stop Date             

                Route                   Dose                   Status           

        

Fill Instructions                

 

                Effexor XR                   245072                   RxNorm    

               

2019                   oral                  

                          150 mg  capsule,extended release 24hr                 

   completed              

                                              for 30 day(s)                

 

                buspirone                   264810                   RxNorm     

              

2021                   oral                  

                    10 mg  tablet                    active                     

    for 30 day(s)   

            

 

                    hydroxyzine HCl                   733893                   R

xNorm               

                    2020                   or

al               

                    25 mg  tablet                    completed                  

       for 45 

day(s)                

 

                Effexor XR                   330350                   RxNorm    

               

2018                   oral                  

                          37.5 mg  capsule,extended release 24hr                

    completed             

                                                             

 

                venlafaxine                   016332                   RxNorm   

                

2020                   2020-10-18                   oral                  

                          37.5 mg  capsule,extended release 24hr                

    completed             

                                              for 30 day(s)                

 

                Effexor XR                   453320                   RxNorm    

               

2018                   oral                  

                          37.5 mg  capsule,extended release 24hr                

    completed             

                                                             

 

                venlafaxine                   [id number]                   RxNorm   

                

2020                   oral                  

                          37.5 mg  capsule,extended release 24hr                

    completed             

                                              for 30 day(s)                

 

                Effexor XR                   264784                   RxNorm    

               

2019                   oral                  

                          150 mg  capsule,extended release 24hr                 

   completed              

                                              for 30 day(s)                

 

                Effexor XR                   759095                   RxNorm    

               

2019                   oral                  

                          37.5 mg  capsule,extended release 24hr                

    completed             

                                              for 30 day(s)                

 

                venlafaxine                   122959                   RxNorm   

                

2020                   oral                  

                          37.5 mg  capsule,extended release 24hr                

    completed             

                                              for 30 day(s)                

 

                Effexor XR                   501700                   RxNorm    

               

2018                   oral                  

                          75 mg  capsule,extended release 24hr                  

  completed               

                                                             

 

                Effexor XR                   225696                   RxNorm    

               

2020-10-27                   2020                   oral                  

                          37.5 mg  capsule,extended release 24hr                

    completed             

                                              for 30 day(s)                

 

                    bupropion HCl                   [id number]                   RxN

orm                 

                    2020                   or

al                 

                          300 mg  tablet extended release 24 hr                 

   completed             

                                              for 30 day(s)                

 

                Effexor XR                   368315                   RxNorm    

               

2020-10-27                   2020                   oral                  

                          150 mg  capsule,extended release 24hr                 

   completed              

                                              for 30 day(s)                

 

                    hydroxyzine HCl                   143248                   R

xNorm               

                    2019                   or

al               

                    25 mg  tablet                    completed                  

       for 30 

day(s)                

 

                    hydroxyzine HCl                   449004                   R

xNorm               

                    2020                   or

al               

                    25 mg  tablet                    completed                  

       for 30 

day(s)                

 

                Effexor XR                   839459                   RxNorm    

               

2019                   oral                  

                          150 mg  capsule,extended release 24hr                 

   completed              

                                              for 30 day(s)                

 

                venlafaxine                   525063                   RxNorm   

                

2020                   oral                  

                          37.5 mg  capsule,extended release 24hr                

    completed             

                                              for 30 day(s)                

 

                    Wellbutrin XL                   [id number]                   RxN

orm                 

                    2019                   or

al                 

                          300 mg  tablet extended release 24 hr                 

   completed             

                                              for 30 day(s)                

 

                Effexor XR                   199487                   RxNorm    

               

2019                   2019-10-24                   oral                  

                          150 mg  capsule,extended release 24hr                 

   completed              

                                              for 30 day(s)                

 

                Effexor XR                   834032                   RxNorm    

               

2021                   oral                  

                          75 mg  capsule,extended release 24hr                  

  completed               

                                              for 30 day(s)                

 

                Effexor XR                   619654                   RxNorm    

               

2018                   oral                  

                          37.5 mg  capsule,extended release 24hr                

    completed             

                                                             

 

                Effexor XR                   538790                   RxNorm    

               

2020                   oral                  

                          37.5 mg  capsule,extended release 24hr                

    completed             

                                              for 30 day(s)                

 

                venlafaxine                   903389                   RxNorm   

                

2019-10-29                   2019                   oral                  

                          37.5 mg  capsule,extended release 24hr                

    completed             

                                              for 30 day(s)                

 

                venlafaxine                   500983                   RxNorm   

                

2020                   oral                  

                          150 mg  capsule,extended release 24hr                 

   completed              

                                              for 30 day(s)                

 

                Effexor XR                   402455                   RxNorm    

               

2018                   oral                  

                          37.5 mg  capsule,extended release 24hr                

    completed             

                                                             

 

                Effexor XR                   526297                   RxNorm    

               

2018                   oral                  

                          75 mg  capsule,extended release 24hr                  

  completed               

                                              for 30 day(s)                

 

                trazodone                   366654                   RxNorm     

              

2020                   oral                  

                    50 mg  tablet                    completed                  

       for 30 day(s)

               

 

                trazodone                   757547                   RxNorm     

              

2020                   oral                  

                    50 mg  tablet                    completed                  

       for 30 day(s)

               

 

                Effexor XR                   359295                   RxNorm    

               

2020                   oral                  

                          150 mg  capsule,extended release 24hr                 

   completed              

                                              for 30 day(s)                

 

                venlafaxine                   719405                   RxNorm   

                

2020                   oral                  

                          150 mg  capsule,extended release 24hr                 

   completed              

                                              for 30 day(s)                

 

                    hydroxyzine HCl                   114911                   R

xNorm               

                    2020                   or

al               

                    25 mg  tablet                    completed                  

       for 30 

day(s)                

 

                trazodone                   192649                   RxNorm     

              

2020                   oral                  

                    50 mg  tablet                    completed                  

       for 30 day(s)

               

 

                trazodone                   575484                   RxNorm     

              

2020                   2020-02-15                   oral                  

                    50 mg  tablet                    completed                  

       for 30 day(s)

               

 

                Effexor XR                   569692                   RxNorm    

               

2018                   oral                  

                          37.5 mg  capsule,extended release 24hr                

    completed             

                                                             

 

                Effexor XR                   476054                   RxNorm    

               

2019                   oral                  

                          37.5 mg  capsule,extended release 24hr                

    completed             

                                              for 30 day(s)                

 

                Effexor XR                   821783                   RxNorm    

               

2019                   oral                  

                          150 mg  capsule,extended release 24hr                 

   completed              

                                              for 30 day(s)                

 

                    Wellbutrin XL                   [id number]                   RxN

orm                 

                    2020                   or

al                 

                          300 mg  tablet extended release 24 hr                 

   active                

                                              for 30 day(s)                

 

                Remeron                   341872                   RxNorm       

            

2020                   oral                  

                    15 mg  tablet                    completed                  

       for 30 day(s)

               

 

                Effexor XR                   091062                   RxNorm    

               

2020                   oral                  

                          37.5 mg  capsule,extended release 24hr                

    completed             

                                              for 30 day(s)                

 

                trazodone                   491696                   RxNorm     

              

2020                   oral                  

                    50 mg  tablet                    completed                  

       for 30 day(s)

               

 

                Effexor XR                   586998                   RxNorm    

               

2018                   oral                  

                          75 mg  capsule,extended release 24hr                  

  completed               

                                                             

 

                venlafaxine                   440084                   RxNorm   

                

2019                   oral                  

                          37.5 mg  capsule,extended release 24hr                

    completed             

                                              for 30 day(s)                

 

                Effexor XR                   945751                   RxNorm    

               

2021                   oral                  

                          75 mg  capsule,extended release 24hr                  

  active                  

                                              for 30 day(s)                

 

                venlafaxine                   573298                   RxNorm   

                

2020                   oral                  

                          37.5 mg  capsule,extended release 24hr                

    completed             

                                              for 30 day(s)                

 

                Effexor XR                   795739                   RxNorm    

               

2018                   oral                  

                          75 mg  capsule,extended release 24hr                  

  completed               

                                              for 30 day(s)                

 

                    hydroxyzine HCl                   786402                   R

xNorm               

                    2020                   or

al               

                    25 mg  tablet                    completed                  

       for 30 

day(s)                

 

                Effexor XR                   311749                   RxNorm    

               

2021                   oral                  

                          150 mg  capsule,extended release 24hr                 

   completed              

                                              for 30 day(s)                

 

                Effexor XR                   929018                   RxNorm    

               

2019                   oral                  

                          37.5 mg  capsule,extended release 24hr                

    completed             

                                              for 30 day(s)                

 

                venlafaxine                   678352                   RxNorm   

                

2020                   oral                  

                          150 mg  capsule,extended release 24hr                 

   completed              

                                              for 30 day(s)                

 

                venlafaxine                   193125                   RxNorm   

                

2020                   oral                  

                          150 mg  capsule,extended release 24hr                 

   completed              

                                              for 30 day(s)                

 

                Remeron                   220752                   RxNorm       

            

2021                   oral                  

                    15 mg  tablet                    completed                  

       for 30 day(s)

               

 

                venlafaxine                   200328                   RxNorm   

                

2019                   oral                  

                          150 mg  capsule,extended release 24hr                 

   completed              

                                              for 30 day(s)                

 

                Remeron                   [id number]                   RxNorm       

            

2021                   oral                  

                    15 mg  tablet                    active                     

    for 30 day(s)   

            

 

                    hydroxyzine HCl                   414731                   R

xNorm               

                    2019                   or

al               

                    25 mg  tablet                    completed                  

       for 30 

day(s)                

 

                Effexor XR                   855622                   RxNorm    

               

2018                   oral                  

                          75 mg  capsule,extended release 24hr                  

  completed               

                                                             

 

                Effexor XR                   930035                   RxNorm    

               

2019                   oral                  

                          37.5 mg  capsule,extended release 24hr                

    completed             

                                              for 30 day(s)                

 

                Effexor XR                   339323                   RxNorm    

               

2019                   oral                  

                          150 mg  capsule,extended release 24hr                 

   completed              

                                              for 30 day(s)                

 

                    hydroxyzine HCl                   946204                   R

xNorm               

                    2021                   or

al               

                    25 mg  tablet                    active                     

    for 30 day(s)

               

 

                Effexor XR                   188295                   RxNorm    

               

2021                   oral                  

                          150 mg  capsule,extended release 24hr                 

   active                 

                                              for 30 day(s)                

 

                venlafaxine                   559175                   RxNorm   

                

2020                   2020-10-18                   oral                  

                          150 mg  capsule,extended release 24hr                 

   completed              

                                              for 30 day(s)                

 

                Effexor XR                   828593                   RxNorm    

               

2020                   oral                  

                          150 mg  capsule,extended release 24hr                 

   completed              

                                              for 30 day(s)                

 

                venlafaxine                   320197                   RxNorm   

                

2020                   oral                  

                          150 mg  capsule,extended release 24hr                 

   completed              

                                              for 30 day(s)                

 

                    bupropion HCl                   301422                   RxN

orm                 

                    2019                   or

al                 

                          300 mg  tablet extended release 24 hr                 

   completed             

                                              for 30 day(s)                

 

                venlafaxine                   073004                   RxNorm   

                

2020                   oral                  

                          37.5 mg  capsule,extended release 24hr                

    completed             

                                              for 30 day(s)                

 

                Effexor XR                   945327                   RxNorm    

               

2019                   2019-10-24                   oral                  

                          37.5 mg  capsule,extended release 24hr                

    completed             

                                              for 30 day(s)                

 

                Effexor XR                   439203                   RxNorm    

               

2021                   2021-02-15                   oral                  

                          75 mg  capsule,extended release 24hr                  

  completed               

                                              for 30 day(s)                

 

                Effexor XR                   824317                   RxNorm    

               

2018                   oral                  

                          75 mg  capsule,extended release 24hr                  

  completed               

                                                             

 

                venlafaxine                   130032                   RxNorm   

                

2019-10-29                   2019                   oral                  

                          150 mg  capsule,extended release 24hr                 

   completed              

                                              for 30 day(s)                

 

                Effexor XR                   095194                   RxNorm    

               

2018                   oral                  

                          75 mg  capsule,extended release 24hr                  

  completed               

                                              for 30 day(s)                

 

                Effexor XR                   821690                   RxNorm    

               

2021-02-15                   2021                   oral                  

                          75 mg  capsule,extended release 24hr                  

  completed               

                                              for 30 day(s)                

 

                buspirone                   498877                   RxNorm     

              

2019                   oral                  

                    15 mg  tablet                    completed                  

       for 90 day(s)

               

 

                    bupropion HCl                   009827                   RxN

orm                 

                    2020                   or

al                 

                          300 mg  tablet extended release 24 hr                 

   completed             

                                              for 30 day(s)                



                                                                                
                                                                                
                                                                                
                                                                                
                                                                                
                                                                                
                                                                                
                                                                                
                                                                                
                                                                                
    



Problems

                      



                    Problem Name                   Code                   CodeSy

stem                

                    Alternate Code                   Alternate CodeSystem       

            Start 

Date                   End Date                   Status                   

Narrative                

 

                           Recurrent depressive disorder, current episode modera

te                   

[id number]                   SNOMED-CT                                           

 

                    2017                                       Active     

        

                                                        

 

                     Generalized anxiety disorder                   [id number]    

               

SNOMED-CT                                                           2021  

  

                                        Active                                  

 



                                                                                
                                           



Relevant diagnostic tests/laboratory data Narrative

          No Information                                                        
                 



Procedures

                      



                    Procedure Name                   Code                   Code

System              

                    Target Site                   Date of Procedure             

      Status    

                          Service Delivery Location                   Device Cod

e           

                          Device Name                   Device UID              

  

 

                          Psychotherapy, 45 minutes with patient                

   [id number]               

                    SNOMED-CT                    ()                   2017

                 

                          completed                   [address], [id number]  [id number]                                            

       

                                                        

 

                          Psychotherapy, 45 minutes with patient                

   [id number]               

                    SNOMED-CT                    ()                   2017

                 

                          completed                   [address], [id number]  [id number]                                            

       

                                                        

 

                          Psychotherapy, 45 minutes with patient                

   [id number]               

                    SNOMED-CT                    ()                   2017

                 

                          completed                   [address], [id number]  [id number]                                            

       

                                                        

 

                          Psychotherapy, 45 minutes with patient                

   [id number]               

                    SNOMED-CT                    ()                   2017-10-16

                 

                          completed                   [facility], [id number]  [id number]                                            

       

                                                        

 

                          Psychotherapy, 45 minutes with patient                

   [id number]               

                    SNOMED-CT                    ()                   2017-10-31

                 

                          completed                   [address], [id number]  [id number]                                            

       

                                                        

 

                          Psychotherapy, 45 minutes with patient                

   [id number]               

                    SNOMED-CT                    ()                   2017

                 

                          completed                   [address], [id number]  [id number]                                            

       

                                                        

 

                          Psychotherapy, 45 minutes with patient                

   [id number]               

                    SNOMED-CT                    ()                   2020-10-29

                 

                          completed                   [address], [id number]  [id number]                                            

       

                                                        

 

                          Psychotherapy, 45 minutes with patient                

   [id number]               

                    SNOMED-CT                    ()                   2020

                 

                          completed                   [address], [id number]  [id number]                                            

       

                                                        

 

                          Psychotherapy, 45 minutes with patient                

   [id number]               

                    SNOMED-CT                    ()                   2020

                 

                          completed                   [address], [id number]  [id number]                                            

       

                                                        

 

                          Psychotherapy, 45 minutes with patient                

   [id number]               

                    SNOMED-CT                    ()                   2020

                 

                          completed                   [address], [id number]  [id number]                                            

       

                                                        

 

                          Psychotherapy, 45 minutes with patient                

   [id number]               

                    SNOMED-CT                    ()                   2021

                 

                          completed                   [address], [id number]  [id number]                                            

       

                                                        

 

                          Psychotherapy, 45 minutes with patient                

   [id number]               

                    SNOMED-CT                    ()                   2021

                 

                          completed                   [address], [id number]  [id number]                                            

       

                                                        

 

                          Psychotherapy, 45 minutes with patient                

   [id number]               

                    SNOMED-CT                    ()                   2021

                 

                          completed                   [address], [id number]  [id number]                                            

       

                                                        

 

                          Psychotherapy, 45 minutes with patient                

   [id number]               

                    SNOMED-CT                    ()                   2021

                 

                          completed                   [address], [id number]  [id number]                                            

       

                                                        

 

                          Psychotherapy, 45 minutes with patient                

   [id number]               

                    SNOMED-CT                    ()                   2021

                 

                          completed                   [address], [id number]  [id number]                                            

       

                                                        

 

                          Psychotherapy, 45 minutes with patient                

   [id number]               

                    SNOMED-CT                    ()                   2021

                 

                          completed                   [address], [id number]  [id number]                                            

       

                                                        

 

                          Psychotherapy, 45 minutes with patient                

   [id number]               

                    SNOMED-CT                    ()                   2020-10-08

                 

                          completed                   [address], [id number]  [id number]                                            

       

                                                        

 

                          Psychotherapy, 45 minutes with patient                

   [id number]               

                    SNOMED-CT                    ()                   2020

                 

                          completed                   [address], 197398730  [id number]                                            

       

                                                        

 

                          Psychotherapy, 45 minutes with patient                

   [id number]               

                    SNOMED-CT                    ()                   2021

                 

                          completed                   [address], 746734210  [id number]                                            

       

                                                        

 

                          Psychotherapy, 45 minutes with patient                

   [id number]               

                    SNOMED-CT                    ()                   2021

                 

                          completed                   [address], [id number]  [id number]                                            

       

                                                        

 

                          Psychotherapy, 45 minutes with patient                

   [id number]               

                    SNOMED-CT                    ()                   2021

                 

                          completed                   [address], [id number]  [id number]                                            

       

                                                        

 

                          Psychotherapy, 45 minutes with patient                

   [id number]               

                    SNOMED-CT                    ()                   2021

                 

                          completed                   [address], [id number]  [id number]                                            

       

                                                        

 

                          Psychotherapy, 45 minutes with patient                

   [id number]               

                    SNOMED-CT                    ()                   2021

                 

                          completed                   [address], 718814897  [id number]                                            

       

                                                        

 

                          Psychotherapy, 45 minutes with patient                

   [id number]               

                    SNOMED-CT                    ()                   2018

                 

                          completed                   [address], 399852265  [id number]                                            

       

                                                        

 

                          Psychotherapy, 45 minutes with patient                

   [id number]               

                    SNOMED-CT                    ()                   2020

                 

                          completed                   [address], [id number]  [id number]                                            

       

                                                        

 

                          Psychotherapy, 45 minutes with patient                

   [id number]               

                    SNOMED-CT                    ()                   2020

                 

                          completed                   [address], [id number]  [id number]                                            

       

                                                        

 

                          Psychotherapy, 45 minutes with patient                

   [id number]               

                    SNOMED-CT                    ()                   2020

                 

                          completed                   [address], [id number]  [id number]                                            

       

                                                        

 

                          Psychotherapy, 45 minutes with patient                

   [id number]               

                    SNOMED-CT                    ()                   2020

                 

                          completed                   [address], [id number]  [id number]                                            

       

                                                        

 

                          Psychotherapy, 45 minutes with patient                

   [id number]               

                    SNOMED-CT                    ()                   2020

                 

                          completed                   [address], [id number]  [id number]                                            

       

                                                        

 

                          Psychotherapy, 45 minutes with patient                

   [id number]               

                    SNOMED-CT                    ()                   2018

                 

                          completed                   [address], [id number]  [id number]                                            

       

                                                        

 

                          Psychotherapy, 45 minutes with patient                

   [id number]               

                    SNOMED-CT                    ()                   2017

                 

                          completed                   [address], [id number]  [id number]                                            

       

                                                        

 

                          Psychotherapy, 45 minutes with patient                

   [id number]               

                    SNOMED-CT                    ()                   2017

                 

                          completed                   [address], [id number]  [id number]                                            

       

                                                        

 

                          Psychotherapy, 45 minutes with patient                

   [id number]               

                    SNOMED-CT                    ()                   2018

                 

                          completed                   [address], [id number]  [id number]                                            

       

                                                        

 

                          Psychotherapy, 45 minutes with patient                

   [id number]               

                    SNOMED-CT                    ()                   2018

                 

                          completed                   [address], [id number]  [id number]                                            

       

                                                        

 

                          Psychotherapy, 45 minutes with patient                

   [id number]               

                    SNOMED-CT                    ()                   2018

                 

                          completed                   [address], [id number]  [id number]                                            

       

                                                        

 

                          Psychotherapy, 45 minutes with patient                

   [id number]               

                    SNOMED-CT                    ()                   2018

                 

                          completed                   [address], 005507429  [id number]                                            

       

                                                        

 

                          Psychotherapy, 45 minutes with patient                

   [id number]               

                    SNOMED-CT                    ()                   2018

                 

                          completed                   [address], [id number]  [id number]                                            

       

                                                        

 

                          Psychotherapy, 45 minutes with patient                

   [id number]               

                    SNOMED-CT                    ()                   2018

                 

                          completed                   [address], 570002227  [id number]                                            

       

                                                        

 

                          Psychotherapy, 45 minutes with patient                

   [id number]               

                    SNOMED-CT                    ()                   2018

                 

                          completed                   [address], [id number]  [id number]                                            

       

                                                        

 

                          Psychotherapy, 45 minutes with patient                

   [id number]               

                    SNOMED-CT                    ()                   2018

                 

                          completed                   [address], [id number]  [id number]                                            

       

                                                        

 

                          Psychotherapy, 45 minutes with patient                

   [id number]               

                    SNOMED-CT                    ()                   2018

                 

                          completed                   [address], [id number]  [id number]                                            

       

                                                        

 

                          Psychotherapy, 45 minutes with patient                

   [id number]               

                    SNOMED-CT                    ()                   2019

                 

                          completed                   [address], [id number]  [id number]                                            

       

                                                        

 

                          Psychotherapy, 45 minutes with patient                

   [id number]               

                    SNOMED-CT                    ()                   2018

                 

                          completed                   [address], [id number]  [id number]                                            

       

                                                        

 

                          Psychotherapy, 45 minutes with patient                

   [id number]               

                    SNOMED-CT                    ()                   2018

                 

                          completed                   [address], [id number]  [id number]                                            

       

                                                        

 

                          Psychotherapy, 45 minutes with patient                

   [id number]               

                    SNOMED-CT                    ()                   2018-10-16

                 

                          completed                   [address], [id number]  [id number]                                            

       

                                                        

 

                          Psychotherapy, 45 minutes with patient                

   [id number]               

                    SNOMED-CT                    ()                   2018

                 

                          completed                   [address], 428953428  [id number]                                            

       

                                                        

 

                          Psychotherapy, 45 minutes with patient                

   [id number]               

                    SNOMED-CT                    ()                   2018-12-10

                 

                          completed                   [address], [id number]  [id number]                                            

       

                                                        

 

                          Psychotherapy, 45 minutes with patient                

   [id number]               

                    SNOMED-CT                    ()                   2019

                 

                          completed                   [address], [id number]  [id number]                                            

       

                                                        

 

                          Psychotherapy, 45 minutes with patient                

   [id number]               

                    SNOMED-CT                    ()                   2019

                 

                          completed                   [address], [id number]  [id number]                                            

       

                                                        

 

                          Psychotherapy, 45 minutes with patient                

   [id number]               

                    SNOMED-CT                    ()                   2019

                 

                          completed                   [address], [id number]  [id number]                                            

       

                                                        

 

                          Psychotherapy, 45 minutes with patient                

   [id number]               

                    SNOMED-CT                    ()                   2019

                 

                          completed                   [address], [id number]  [id number]                                            

       

                                                        

 

                          Psychotherapy, 45 minutes with patient                

   [id number]               

                    SNOMED-CT                    ()                   2019-03-15

                 

                          completed                   [address], [id number]  [id number]                                            

       

                                                        

 

                          Psychotherapy, 45 minutes with patient                

   [id number]               

                    SNOMED-CT                    ()                   2019-04-15

                 

                          completed                   [address], [id number]  [id number]                                            

       

                                                        

 

                          Psychotherapy, 45 minutes with patient                

   [id number]               

                    SNOMED-CT                    ()                   2020

                 

                          completed                   [address], [id number]  [id number]                                            

       

                                                        

 

                          Psychotherapy, 45 minutes with patient                

   [id number]               

                    SNOMED-CT                    ()                   2019

                 

                          completed                   [address], [id number] 

[id number]                                                                      

  

  

 

                          Psychotherapy, 45 minutes with patient                

   [id number]               

                    SNOMED-CT                    ()                   2019-06-10

                 

                          completed                   [address], [id number]  [id number]                                            

       

                                                        

 

                          Psychotherapy, 45 minutes with patient                

   [id number]               

                    SNOMED-CT                    ()                   2019

                 

                          completed                   [address], [id number]  [id number]                                            

       

                                                        

 

                          Psychotherapy, 45 minutes with patient                

   [id number]               

                    SNOMED-CT                    ()                   2019

                 

                          completed                   [address], [id number]  [id number]                                            

       

                                                        

 

                          Psychotherapy, 45 minutes with patient                

   [id number]               

                    SNOMED-CT                    ()                   2019

                 

                          completed                   [address], [id number]  [phone]                                            

       

                                                        

 

                          Psychotherapy, 45 minutes with patient                

   [id number]               

                    SNOMED-CT                    ()                   2020

                 

                          completed                   [address], [id number]  [id number]                                            

       

                                                        

 

                          Psychotherapy, 45 minutes with patient                

   [id number]               

                    SNOMED-CT                    ()                   2019

                 

                          completed                   [address], [id number]  [id number]                                            

       

                                                        

 

                          Psychotherapy, 45 minutes with patient                

   [id number]               

                    SNOMED-CT                    ()                   2019-10-22

                 

                          completed                   [address], [id number]  [id number]                                            

       

                                                        

 

                          Psychotherapy, 45 minutes with patient                

   [id number]               

                    SNOMED-CT                    ()                   2019

                 

                          completed                   [address], [id number]  [id number]                                            

       

                                                        

 

                          Psychotherapy, 45 minutes with patient                

   [id number]               

                    SNOMED-CT                    ()                   2020

                 

                          completed                   [address], [id number]  [id number]                                            

       

                                                        

 

                          Psychotherapy, 45 minutes with patient                

   [id number]               

                    SNOMED-CT                    ()                   2020

                 

                          completed                   [address], [id number]  [id number]                                            

       

                                                        

 

                    Initial Psychiatric Evaluation                   [id number]  

                 

SNOMED-CT                    ()                   2017                   

completed                               [address], [id number]  [id number]                                            

       

                                                        

 

                          Health Monitoring / Risk Reduction Counseling - [facility]                   

[id number]                   SNOMED-CT                    ()                   

2017                   completed                   [address], [id number]  [id number]                   

                                                                    

 

                    Est. Patient - E&M Intermediate                   [id number] 

                  

SNOMED-CT                    ()                   2018                   

completed                               [address], [id number]  [id number]                                            

       

                                                        

 

                    Est. Patient - E&M Intermediate                   [id number] 

                  

SNOMED-CT                    ()                   2018                   

completed                               [address], [id number]  [id number]                                            

       

                                                        

 

                    Est. Patient - E&M Intermediate                   [id number] 

                  

SNOMED-CT                    ()                   2019-01-15                   

completed                               [address], [id number]  [id number]                                            

       

                                                        

 

                    Est. Patient - E&M Intermediate                   [id number] 

                  

SNOMED-CT                    ()                   2019                   

completed                               [address], [id number]  [id number]                                            

       

                                                        

 

                    Est. Patient - E&M Intermediate                   [id number] 

                  

SNOMED-CT                    ()                   2019                   

completed                               [address], [id number]  [id number]                                            

       

                                                        

 

                    Est. Patient - E&M Intermediate                   [id number] 

                  

SNOMED-CT                    ()                   2019                   

completed                               [address], [id number]  [id number]                                            

       

                                                        

 

                    Est. Patient - E&M Intermediate                   [id number] 

                  

SNOMED-CT                    ()                   2019                   

completed                               [address], [id number]  [id number]                                            

       

                                                        

 

                    Est. Patient - E&M Intermediate                   [id number] 

                  

SNOMED-CT                    ()                   2019-10-08                   

completed                               [address], [id number]  [id number]                                            

       

                                                        

 

                    Est. Patient - E&M Intermediate                   [id number] 

                  

SNOMED-CT                    ()                   2019                   

completed                               [address], [id number]  [id number]                                            

       

                                                        

 

                    Est. Patient - E&M Intermediate                   [id number] 

                  

SNOMED-CT                    ()                   2020                   

completed                               [address], [id number]  [id number]                                            

       

                                                        

 

                    Est. Patient - E&M Intermediate                   [id number] 

                  

SNOMED-CT                    ()                   2020                   

completed                               [address], [id number]  [id number]                                            

       

                                                        

 

                    Est. Patient - E&M Intermediate                   [id number] 

                  

SNOMED-CT                    ()                   2020                   

completed                               [address], [id number]  [id number]                                            

       

                                                        

 

                    Est. Patient - E&M Intermediate                   [id number] 

                  

SNOMED-CT                    ()                   2019                   

completed                               [address], [id number]  [id number]                                            

       

                                                        

 

                    Est. Patient - E&M Intermediate                   [id number] 

                  

SNOMED-CT                    ()                   2019                   

completed                               [address], [id number]  [id number]                                            

       

                                                        

 

                    Est. Patient - E&M Intermediate                   [id number] 

                  

SNOMED-CT                    ()                   2019                   

completed                               [address], [id number]  [id number]                                            

       

                                                        

 

                    Est. Patient - E&M Brief                   [id number]        

           SNOMED-CT

                     ()                   2018                   completed

  

                                        [address], 

[id number]  [id number]                                                           

  

             

 

                    Est. Patient - E&M Brief                   [id number]        

           SNOMED-CT

                     ()                   2019                   completed

  

                                        [address], 

[id number]  [id number]                                                           

  

             

 

                    Est. Patient - E&M Brief                   [id number]        

           SNOMED-CT

                     ()                   2020                   completed

  

                                        [address], 

[id number]  [id number]                                                           

  

             

 

                    Est. Patient - E&M Brief                   [id number]        

           SNOMED-CT

                     ()                   2020                   completed

  

                                        [address], 

[id number]  [id number]                                                           

  

             

 

                    Est. Patient - E&M Brief                   [id number]        

           SNOMED-CT

                     ()                   2020                   completed

  

                                        [address], 

598973909  [id number]                                                           

  

             

 

                    Est. Patient - E&M Brief                   [id number]        

           SNOMED-CT

                     ()                   2020                   completed

  

                                        [address], 

[id number]  [id number]                                                           

  

             

 

                    Est. Patient - E&M Brief                   [id number]        

           SNOMED-CT

                     ()                   2020                   completed

  

                                        [address], 

[id number]  [id number]                                                           

  

             

 

                    Est. Patient - E&M Brief                   [id number]        

           SNOMED-CT

                     ()                   2021                   completed

  

                                        [address], 

[id number]  [id number]                                                           

  

             

 

                    Est. Patient - E&M Brief                   [id number]        

           SNOMED-CT

                     ()                   2021-02-15                   completed

  

                                        [address], 

768472075  [id number]                                                           

  

             

 

                    Est. Patient - E&M Expanded                   [id number]     

              

SNOMED-CT                    ()                   2018                   

completed                               [address], [id number]  [id number]                                            

       

                                                        

 

                    Est. Patient - E&M Expanded                   [id number]     

              

SNOMED-CT                    ()                   2018                   

completed                               [address], [id number]  [id number]                                            

       

                                                        

 

                    Est. Patient - E&M Expanded                   [id number]     

              

SNOMED-CT                    ()                   2018                   

completed                               [address], [id number]  [id number]                                            

       

                                                        

 

                    Est. Patient - E&M Expanded                   [id number]     

              

SNOMED-CT                    ()                   2018                   

completed                               [address], [id number]  [id number]                                            

       

                                                        

 

                    Est. Patient - E&M Expanded                   [id number]     

              

SNOMED-CT                    ()                   2018                   

completed                               [address], [id number]  [id number]                                            

       

                                                        

 

                    Est. Patient - E&M Expanded                   [id number]     

              

SNOMED-CT                    ()                   2018                   

completed                               [address], [id number]  [id number]                                            

       

                                                        

 

                    Est. Patient - E&M Expanded                   [id number]     

              

SNOMED-CT                    ()                   2018                   

completed                               [address], [id number]  [id number]                                            

       

                                                        

 

                    Est. Patient - E&M Expanded                   [id number]     

              

SNOMED-CT                    ()                   2021                   

completed                               [address], [id number]  [id number]                                            

       

                                                        

 

                    Est. Patient - E&M Expanded                   [id number]     

              

SNOMED-CT                    ()                   2021                   

completed                               [address], [id number]  [id number]                                            

       

                                                        

 

                    Est. Patient - E&M Expanded                   [id number]     

              

SNOMED-CT                    ()                   2021                   

completed                               [address], [place], [address]  [id number]                                            

       

                                                        

 

                    Individual Psychotherapy                   [id number]         

          SNOMED-CT 

                     ()                   2018                   completed

   

                                        [address], 

[id number]  [id number]                                                           

  

             

 

                    Individual Psychotherapy                   [id number]         

          SNOMED-CT 

                     ()                   2020                   completed

   

                                        [address], 

[id number]  [id number]                                                           

  

             

 

                    Individual Psychotherapy                   [id number]         

          SNOMED-CT 

                     ()                   2021                   completed

   

                                        [address], 

[id number]  [id number]                                                           

  

             

 

                    Individual Psychotherapy                   [id number]         

          SNOMED-CT 

                     ()                   2021-02-10                   completed

   

                                        [address], 

[id number]  [id number]                                                           

  

             

 

                    Individual Psychotherapy                   [id number]         

          SNOMED-CT 

                     ()                   2017                   completed

   

                                        [address], 

[id number]  [id number]                                                           

  

             

 

                    Individual Psychotherapy                   [id number]         

          SNOMED-CT 

                     ()                   2017                   completed

   

                                        [address], 

[id number]  [id number]                                                           

  

             

 

                    Individual Psychotherapy                   [id number]         

          SNOMED-CT 

                     ()                   2017                   completed

   

                                        [address], 

[id number]  [id number]                                                           

  

             

 

                    Individual Psychotherapy                   [id number]         

          SNOMED-CT 

                     ()                   2017-10-16                   completed

   

                                        [address], 

[id number]  [id number]                                                           

  

             

 

                    Individual Psychotherapy                   [id number]         

          SNOMED-CT 

                     ()                   2017-10-31                   completed

   

                                        [address], 

[id number]  [id number]                                                           

  

             

 

                    Individual Psychotherapy                   [id number]         

          SNOMED-CT 

                     ()                   2017                   completed

   

                                        [address], 

[id number]  [id number]                                                           

  

             

 

                    Individual Psychotherapy                   [id number]         

          SNOMED-CT 

                     ()                   2020-10-29                   completed

   

                                        [address], 

[id number]  [id number]                                                           

  

             

 

                    Individual Psychotherapy                   [id number]         

          SNOMED-CT 

                     ()                   2020                   completed

   

                                        [address], 

[id number]  [id number]                                                           

  

             

 

                    Individual Psychotherapy                   [id number]         

          SNOMED-CT 

                     ()                   2020                   completed

   

                                        [address], 

[id number]  [id number]                                                           

  

             

 

                    Individual Psychotherapy                   [id number]         

          SNOMED-CT 

                     ()                   2020                   completed

   

                                        [address], 

[id number]  [id number]                                                           

  

             

 

                    Individual Psychotherapy                   [id number]         

          SNOMED-CT 

                     ()                   2021                   completed

   

                                        [address], 

[id number]  [id number]                                                           

  

             

 

                    Individual Psychotherapy                   [id number]         

          SNOMED-CT 

                     ()                   2021                   completed

   

                                        [address], 

[id number]  [id number]                                                           

  

             

 

                    Individual Psychotherapy                   [id number]         

          SNOMED-CT 

                     ()                   2021                   completed

   

                                        [address], 

[id number]  [id number]                                                           

  

             

 

                    Individual Psychotherapy                   [id number]         

          SNOMED-CT 

                     ()                   2021                   completed

   

                                        [address], 

[id number]  [id number]                                                           

  

             

 

                    Individual Psychotherapy                   [id number]         

          SNOMED-CT 

                     ()                   2021                   completed

   

                                        [address], 

[id number]  [id number]                                                           

  

             

 

                    Individual Psychotherapy                   [id number]         

          SNOMED-CT 

                     ()                   2021                   completed

   

                                        [address], 

[id number]  [id number]                                                           

  

             

 

                    Individual Psychotherapy                   [id number]         

          SNOMED-CT 

                     ()                   2020-10-08                   completed

   

                                        [address], 

[id number]  [id number]                                                           

  

             

 

                    Individual Psychotherapy                   [id number]         

          SNOMED-CT 

                     ()                   2020                   completed

   

                                        [address], 

[id number]  [id number]                                                           

  

             

 

                    Individual Psychotherapy                   [id number]         

          SNOMED-CT 

                     ()                   2021                   completed

   

                                        [address], 

[id number]  [id number]                                                           

  

             

 

                    Individual Psychotherapy                   [id number]         

          SNOMED-CT 

                     ()                   2021                   completed

   

                                        [address], 

[id number]  [id number]                                                           

  

             

 

                    Individual Psychotherapy                   [id number]         

          SNOMED-CT 

                     ()                   2021                   completed

   

                                        [address], 

[id number]  [id number]                                                           

  

             

 

                    Individual Psychotherapy                   [id number]         

          SNOMED-CT 

                     ()                   2021                   completed

   

                                        [address], 

[id number]  [id number]                                                           

  

             

 

                    Individual Psychotherapy                   [id number]         

          SNOMED-CT 

                     ()                   2021                   completed

   

                                        [address], 

[id number]  [id number]                                                           

  

             

 

                    Individual Psychotherapy                   [id number]         

          SNOMED-CT 

                     ()                   2018                   completed

   

                                        [address], 

[id number]  [id number]                                                           

  

             

 

                    Individual Psychotherapy                   [id number]         

          SNOMED-CT 

                     ()                   2020                   completed

   

                                        [address], 

[id number]  [id number]                                                           

  

             

 

                    Individual Psychotherapy                   [id number]         

          SNOMED-CT 

                     ()                   2020                   completed

   

                                        [address], 

[id number]  [id number]                                                           

  

             

 

                    Individual Psychotherapy                   [id number]         

          SNOMED-CT 

                     ()                   2020                   completed

   

                                        [address] [place], [place], 

[id number]  [id number]                                                           

  

             

 

                    Individual Psychotherapy                   [id number]         

          SNOMED-CT 

                     ()                   2020                   completed

   

                                        [address], 

[id number]  [id number]                                                           

  

             

 

                    Individual Psychotherapy                   [id number]         

          SNOMED-CT 

                     ()                   2020                   completed

   

                                        [address], 

[id number]  [id number]                                                           

  

             

 

                    Individual Psychotherapy                   [id number]         

          SNOMED-CT 

                     ()                   2018                   completed

   

                                        [address], 

[id number]  [id number]                                                           

  

             

 

                    Individual Psychotherapy                   [id number]         

          SNOMED-CT 

                     ()                   2017                   completed

   

                                        [address], 

[id number]  [id number]                                                           

  

             

 

                    Individual Psychotherapy                   [id number]         

          SNOMED-CT 

                     ()                   2017                   completed

   

                                        [address], 

[id number]  [id number]                                                           

  

             

 

                    Individual Psychotherapy                   [id number]         

          SNOMED-CT 

                     ()                   2018                   completed

   

                                        [address], 

[id number]  [id number]                                                           

  

             

 

                    Individual Psychotherapy                   [id number]         

          SNOMED-CT 

                     ()                   2018                   completed

   

                                        [address], 

[id number]  [id number]                                                           

  

             

 

                    Individual Psychotherapy                   [id number]         

          SNOMED-CT 

                     ()                   2018                   completed

   

                                        [address], 

[id number]  [id number]                                                           

  

             

 

                    Individual Psychotherapy                   [id number]         

          SNOMED-CT 

                     ()                   2018                   completed

   

                                        [address], 

[id number]  [id number]                                                           

  

             

 

                    Individual Psychotherapy                   [id number]         

          SNOMED-CT 

                     ()                   2018                   completed

   

                                        [address], 

[id number]  [id number]                                                           

  

             

 

                    Individual Psychotherapy                   [id number]         

          SNOMED-CT 

                     ()                   2018                   completed

   

                                        [address], 

[id number]  [id number]                                                           

  

             

 

                    Individual Psychotherapy                   [id number]         

          SNOMED-CT 

                     ()                   2018                   completed

   

                                        [address], 

[id number]  [id number]                                                           

  

             

 

                    Individual Psychotherapy                   [id number]         

          SNOMED-CT 

                     ()                   2018                   completed

   

                                        [address], 

[id number]  [id number]                                                           

  

             

 

                    Individual Psychotherapy                   [id number]         

          SNOMED-CT 

                     ()                   2018                   completed

   

                                        [address], 

[id number]  [id number]                                                           

  

             

 

                    Individual Psychotherapy                   [id number]         

          SNOMED-CT 

                     ()                   2019                   completed

   

                                        [address], 

[id number]  [id number]                                                           

  

             

 

                    Individual Psychotherapy                   [id number]         

          SNOMED-CT 

                     ()                   2018                   completed

   

                                        [address], 

[id number]  [id number]                                                           

  

             

 

                    Individual Psychotherapy                   [id number]         

          SNOMED-CT 

                     ()                   2018                   completed

   

                                        [address], 

[id number]  [id number]                                                           

  

             

 

                    Individual Psychotherapy                   [id number]         

          SNOMED-CT 

                     ()                   2018-10-16                   completed

   

                                        [address], 

[id number]  [id number]                                                           

  

             

 

                    Individual Psychotherapy                   [id number]         

          SNOMED-CT 

                     ()                   2018                   completed

   

                                        [address], 

[id number]  [id number]                                                           

  

             

 

                    Individual Psychotherapy                   [id number]         

          SNOMED-CT 

                     ()                   2018-12-10                   completed

   

                                        [address], 

[id number]  [id number]                                                           

  

             

 

                    Individual Psychotherapy                   [id number]         

          SNOMED-CT 

                     ()                   2019                   completed

   

                                        [address], 

[id number]  [id number]                                                           

  

             

 

                    Individual Psychotherapy                   [id number]         

          SNOMED-CT 

                     ()                   2019                   completed

   

                                        [address], 

[id number]  [id number]                                                           

  

             

 

                    Individual Psychotherapy                   [id number]         

          SNOMED-CT 

                     ()                   2019                   completed

   

                                        [address], 

[id number]  [id number]                                                           

  

             

 

                    Individual Psychotherapy                   [id number]         

          SNOMED-CT 

                     ()                   2019                   completed

   

                                        [address], 

[id number]  [id number]                                                           

  

             

 

                    Individual Psychotherapy                   [id number]         

          SNOMED-CT 

                     ()                   2019-03-15                   completed

   

                                        [address], 

[id number]  [id number]                                                           

  

             

 

                    Individual Psychotherapy                   [id number]         

          SNOMED-CT 

                     ()                   2019-04-15                   completed

   

                                        [address], 

[id number]  [id number]                                                           

  

             

 

                    Individual Psychotherapy                   [id number]         

          SNOMED-CT 

                     ()                   2020                   completed

   

                                        [address], 

[id number]  [id number]                                                           

  

             

 

                    Individual Psychotherapy                   [id number]         

          SNOMED-CT 

                     ()                   2019                   completed

   

                                        [address],

 [id number]  [id number]   

                                                                            

 

                    Individual Psychotherapy                   [id number]         

          SNOMED-CT 

                     ()                   2019-06-10                   completed

   

                                        [address], 

[id number]  [id number]                                                           

  

             

 

                    Individual Psychotherapy                   [id number]         

          SNOMED-CT 

                     ()                   2019                   completed

   

                                        [address], 

[id number]  [id number]                                                           

  

             

 

                    Individual Psychotherapy                   [id number]         

          SNOMED-CT 

                     ()                   2019                   completed

   

                                        [address], 

[id number]  [id number]                                                           

  

             

 

                    Individual Psychotherapy                   [id number]         

          SNOMED-CT 

                     ()                   2019                   completed

   

                                        [address], 

[id number]  [id number]                                                           

  

             

 

                    Individual Psychotherapy                   [id number]         

          SNOMED-CT 

                     ()                   2020                   completed

   

                                        [address], 

[id number]  [id number]                                                           

  

             

 

                    Individual Psychotherapy                   [id number]         

          SNOMED-CT 

                     ()                   2019                   completed

   

                                        [address], 

[id number]  [id number]                                                           

  

             

 

                    Individual Psychotherapy                   [id number]         

          SNOMED-CT 

                     ()                   2019-10-22                   completed

   

                                        [address], 

[id number]  [id number]                                                           

  

             

 

                    Individual Psychotherapy                   [id number]         

          SNOMED-CT 

                     ()                   2019                   completed

   

                                        [address], 

[id number]  [id number]                                                           

  

             

 

                    Individual Psychotherapy                   [id number]         

          SNOMED-CT 

                     ()                   2020                   completed

   

                                        [address], 

[id number]  [id number]                                                           

  

             

 

                    Individual Psychotherapy                   [id number]         

          SNOMED-CT 

                     ()                   2020                   completed

   

                                        [address], 

[id number]  [id number]                                                           

  

             

 

                          Psychiatric Diagnostic Evaluation without medical serv

ices                   

[id number]                   SNOMED-CT                    ()                   

2017                   completed                   [address], [id number]  [id number]                   

                                                                    

 

                                                      SNOMED-CT                 

   ()           

                    2017                   completed                   [address], [id number]  [id number]           
                                                                            

 

                                                      SNOMED-CT                 

   ()           

                    2017                   completed                   [address], [id number]  [id number]           
                                                                            

 

                                                      SNOMED-CT                 

   ()           

                    2017                   completed                   [address], [id number]  [id number]           
                                                                            

 

                                                      SNOMED-CT                 

   ()           

                    2017                   completed                   [address], [id number]  [id number]           
                                                                            

 

                                                      SNOMED-CT                 

   ()           

                    2017                   completed                   [address], [id number]  [id number]           
                                                                            

 

                                                      SNOMED-CT                 

   ()           

                    2017-10-16                   completed                   [address], [id number]  [id number]           
                                                                            

 

                                                      SNOMED-CT                 

   ()           

                    2017                   completed                   [address], [id number]  [id number]           
                                                                            

 

                                                      SNOMED-CT                 

   ()           

                    2017                   completed                   [address], [id number]  [id number]           
                                                                            

 

                                                      SNOMED-CT                 

   ()           

                    2017                   completed                   [address], [id number]  [id number]           
                                                                            

 

                                                      SNOMED-CT                 

   ()           

                    2017                   completed                   [address], [id number]  [id number]           
                                                                            

 

                                                      SNOMED-CT                 

   ()           

                    2017-10-31                   completed                   [address], [id number]  [id number]           
                                                                            

 

                                                      SNOMED-CT                 

   ()           

                    2018                   completed                   [address], [id number]  [id number]           
                                                                            

 

                                                      SNOMED-CT                 

   ()           

                    2018                   completed                   [address], [id number]  [id number]           
                                                                            

 

                                                      SNOMED-CT                 

   ()           

                    2018                   completed                   [address], [id number]  [phone]           
                                                                            



                                                                                
                                                                                
                                                                                
                                                                                
                                                                                
                                                                                
                                                                                
                                                                                
                                                                                
                                                                                
                                                                                
                                                                                
                                                                                
                                                                                
                                                                                
                                                                                
                                                                                
                                                                                
                                                                                
                                                                                
                                                                                
                                                                                
                                                                                
                                                                       



Encounters/Encounter Diagnoses

                      



                    Encounter Name                   Encounter Code             

      Diagnosis Code

                          Diagnosis Name                   Diagnosis CodeSystem 

        

                          Date of Diagnosis                   Service Delivery L

ocation          

     

 

                          Telehealth Physchotherapy 30 Minutes with Patient     

              [id number]       

                          [id number]                   Recurrent depressive disor

dennies, current 

episode moderate                   SNOMED-CT                   2021       

                                        [facility]  [address] [place] [address], 

[id number]                  



                                                                                
                                 



Vital Signs

                      



                Code                   CodeSystem                   Vitals      

             

Date                                    Value                

 

                31318-2                   LOINC                   BMI           

        

2020                              41.45 (lb/in2)                

 

                    8462-4                   [facility]                   Blood Press

ure-Diastolic       

                          2020                   130 mm[HG]               

 

 

                8867-4                   LOINC                   Heart Rate     

              

2020                              101 /min                

 

                    8480-6                   LOINC                   Blood Press

ure-Systolic        

                          2020                   90 mm[HG]                

 

                8302-2                   LOINC                   Height         

          

2020                              63 [in_i]                

 

                21388-5                   LOINC                   Weight        

           

2020                              234 [lb_av]                



                                                                                
              



Social History

                      



                    Element Description                   Description           

        Start Date  

                    End Date                   Code                   CodeSystem

   

                                        AdditionalInfo                

 

                    SexAssignedAtBirth                   Female                 

  1970        

                                        F                   AdministrativeGender

          

                                                        



                                                                                
    



Hospital Discharge Instructions

                                    * 



                                                                                
                                    



Reason For Referral

                      





                                                                                
    



Medical Equipment

                                    *     FDA



                                                                                
                



Assessments

                                    * 



                                                                                
      



Goals Section

                      



                          Goals                     Planned DateTime            

    

 

                                        Kath will report that her level of dep

ression(irritability) is a 3 or less on 

a scale of 1 low and 10 high most days during the treatment period(90 days).    
                                        2017

## 2021-10-16 NOTE — CCD
Transition of Care

                             Created on: 08/10/2021



Kath Ken

External Reference #: [id number]

: 1970

Sex: Female



Demographics





                          Address                   [address] [place] [id number]

 

                          Home Phone                [phone]

 

                          Preferred Language        English

 

                          Marital Status            Unknown

 

                          Confucianist Affiliation     Unknown

 

                          Race                      Unknown

 

                          Additional Race(s)        Unknown



 

                          Ethnic Group              Unknown





Author





                          Author                    Kath Flaherty

 

                          Organization              [facility]

 

                          Address                   Unknown

 

                          Phone                     Unavailable







Support





                Name            Relationship    Address         Phone

 

                    Next Of Kin         Unknown             Unavailable







Care Team Providers





                    Care Team Member Name Role                Phone

 

                    Yoseph Flaherty     PCP                 Unavailable



                                                            



Allergies, Adverse Reactions, Alerts

                      



                    Allergy Substance                   Code                   C

odeSystem           

                    Reaction                   Severity                   Critic

ality        

                          Status                    Start Date                

 

                metformin                                                       

   nausea: high 

blood sugar                   Moderate                                       

Active                                                  



                                                                                
             



Medications

                      



                    Medication                   Medication Code                

   Medication 

CodeSystem                   Start Date                   Stop Date             

                Route                   Dose                   Status           

        

Fill Instructions                

 

                venlafaxine                   824531                   RxNorm   

                

2020                   oral                  

                          150 mg  capsule,extended release 24hr                 

   completed              

                                              for 30 day(s)                

 

                Remeron                   [id number]                   RxNorm       

            

2020                   oral                  

                    15 mg  tablet                    completed                  

       for 30 day(s)

               

 

                Effexor XR                   [id number]                   RxNorm    

               

2019                   2019-10-24                   oral                  

                          37.5 mg  capsule,extended release 24hr                

    completed             

                                              for 30 day(s)                

 

                Effexor XR                   [id number]                   RxNorm    

               

2018                   oral                  

                          37.5 mg  capsule,extended release 24hr                

    completed             

                                                             

 

                    hydroxyzine HCl                   900212                   R

xNorm               

                    2020                   or

al               

                    25 mg  tablet                    completed                  

       for 30 

day(s)                

 

                venlafaxine                   290969                   RxNorm   

                

2020                   oral                  

                          150 mg  capsule,extended release 24hr                 

   completed              

                                              for 30 day(s)                

 

                Effexor XR                   334894                   RxNorm    

               

2020-10-27                   2020                   oral                  

                          37.5 mg  capsule,extended release 24hr                

    completed             

                                              for 30 day(s)                

 

                    hydroxyzine HCl                   645905                   R

xNorm               

                    2019                   or

al               

                    25 mg  tablet                    completed                  

       for 30 

day(s)                

 

                Effexor XR                   682645                   RxNorm    

               

2019                   oral                  

                          150 mg  capsule,extended release 24hr                 

   completed              

                                              for 30 day(s)                

 

                Effexor XR                   168955                   RxNorm    

               

2021                   oral                  

                          75 mg  capsule,extended release 24hr                  

  active                  

                                              for 30 day(s)                

 

                venlafaxine                   637986                   RxNorm   

                

2020                   oral                  

                          37.5 mg  capsule,extended release 24hr                

    completed             

                                              for 30 day(s)                

 

                trazodone                   121765                   RxNorm     

              

2020                   oral                  

                    50 mg  tablet                    completed                  

       for 30 day(s)

               

 

                Effexor XR                   [id number]                   RxNorm    

               

2021                   oral                  

                          75 mg  capsule,extended release 24hr                  

  completed               

                                              for 30 day(s)                

 

                venlafaxine                   393142                   RxNorm   

                

2019                   oral                  

                          150 mg  capsule,extended release 24hr                 

   completed              

                                              for 30 day(s)                

 

                venlafaxine                   079930                   RxNorm   

                

2020                   oral                  

                          37.5 mg  capsule,extended release 24hr                

    completed             

                                              for 30 day(s)                

 

                Effexor XR                   193187                   RxNorm    

               

2020                   oral                  

                          150 mg  capsule,extended release 24hr                 

   completed              

                                              for 30 day(s)                

 

                    hydroxyzine HCl                   992104                   R

xNorm               

                    2020                   or

al               

                    25 mg  tablet                    completed                  

       for 30 

day(s)                

 

                venlafaxine                   274428                   RxNorm   

                

2020                   oral                  

                          150 mg  capsule,extended release 24hr                 

   completed              

                                              for 30 day(s)                

 

                trazodone                   075696                   RxNorm     

              

2020                   oral                  

                    50 mg  tablet                    completed                  

       for 30 day(s)

               

 

                Effexor XR                   941333                   RxNorm    

               

2021                   oral                  

                          150 mg  capsule,extended release 24hr                 

   completed              

                                              for 30 day(s)                

 

                    bupropion HCl                   101721                   RxN

orm                 

                    2020                   or

al                 

                          300 mg  tablet extended release 24 hr                 

   completed             

                                              for 30 day(s)                

 

                Effexor XR                   907225                   RxNorm    

               

2018                   oral                  

                          75 mg  capsule,extended release 24hr                  

  completed               

                                              for 30 day(s)                

 

                venlafaxine                   421030                   RxNorm   

                

2020                   oral                  

                          37.5 mg  capsule,extended release 24hr                

    completed             

                                              for 30 day(s)                

 

                Effexor XR                   393893                   RxNorm    

               

2019                   oral                  

                          150 mg  capsule,extended release 24hr                 

   completed              

                                              for 30 day(s)                

 

                    hydroxyzine HCl                   442154                   R

xNorm               

                    2020                   or

al               

                    25 mg  tablet                    completed                  

       for 45 

day(s)                

 

                trazodone                   678601                   RxNorm     

              

2020                   oral                  

                    50 mg  tablet                    completed                  

       for 30 day(s)

               

 

                    Wellbutrin XL                   [id number]                   RxN

orm                 

                    2020                   or

al                 

                          300 mg  tablet extended release 24 hr                 

   active                

                                              for 30 day(s)                

 

                Effexor XR                   643245                   RxNorm    

               

2019                   oral                  

                          37.5 mg  capsule,extended release 24hr                

    completed             

                                              for 30 day(s)                

 

                Effexor XR                   681121                   RxNorm    

               

2019                   oral                  

                          150 mg  capsule,extended release 24hr                 

   completed              

                                              for 30 day(s)                

 

                Effexor XR                   [id number]                   RxNorm    

               

2019                   oral                  

                          150 mg  capsule,extended release 24hr                 

   completed              

                                              for 30 day(s)                

 

                venlafaxine                   833730                   RxNorm   

                

2020                   oral                  

                          37.5 mg  capsule,extended release 24hr                

    completed             

                                              for 30 day(s)                

 

                Effexor XR                   061459                   RxNorm    

               

2019                   oral                  

                          37.5 mg  capsule,extended release 24hr                

    completed             

                                              for 30 day(s)                

 

                venlafaxine                   336176                   RxNorm   

                

2019-10-29                   2019                   oral                  

                          37.5 mg  capsule,extended release 24hr                

    completed             

                                              for 30 day(s)                

 

                Remeron                   873842                   RxNorm       

            

2021                   oral                  

                    15 mg  tablet                    completed                  

       for 30 day(s)

               

 

                trazodone                   205076                   RxNorm     

              

2020                   2020-02-15                   oral                  

                    50 mg  tablet                    completed                  

       for 30 day(s)

               

 

                Effexor XR                   156568                   RxNorm    

               

2020-10-27                   2020                   oral                  

                          150 mg  capsule,extended release 24hr                 

   completed              

                                              for 30 day(s)                

 

                buspirone                   198842                   RxNorm     

              

2021                   oral                  

                    10 mg  tablet                    active                     

    for 30 day(s)   

            

 

                Effexor XR                   641787                   RxNorm    

               

2018                   oral                  

                          75 mg  capsule,extended release 24hr                  

  completed               

                                                             

 

                Effexor XR                   467855                   RxNorm    

               

2019                   oral                  

                          37.5 mg  capsule,extended release 24hr                

    completed             

                                              for 30 day(s)                

 

                    bupropion HCl                   319671                   RxN

orm                 

                    2020                   or

al                 

                          300 mg  tablet extended release 24 hr                 

   completed             

                                              for 30 day(s)                

 

                Effexor XR                   466286                   RxNorm    

               

2020                   oral                  

                          37.5 mg  capsule,extended release 24hr                

    completed             

                                              for 30 day(s)                

 

                trazodone                   081264                   RxNorm     

              

2020                   oral                  

                    50 mg  tablet                    completed                  

       for 30 day(s)

               

 

                venlafaxine                   395712                   RxNorm   

                

2020                   2020-10-18                   oral                  

                          150 mg  capsule,extended release 24hr                 

   completed              

                                              for 30 day(s)                

 

                venlafaxine                   133626                   RxNo   

                

2019-10-29                   2019                   oral                  

                          150 mg  capsule,extended release 24hr                 

   completed              

                                              for 30 day(s)                

 

                Effexor XR                   498035                   RxNo    

               

2021-02-15                   2021                   oral                  

                          75 mg  capsule,extended release 24hr                  

  completed               

                                              for 30 day(s)                

 

                Effexor XR                   592228                   RxNo    

               

2019                   oral                  

                          37.5 mg  capsule,extended release 24hr                

    completed             

                                              for 30 day(s)                

 

                venlafaxine                   503163                   RxNo   

                

2020                   oral                  

                          150 mg  capsule,extended release 24hr                 

   completed              

                                              for 30 day(s)                

 

                Remeron                   [id number]                   RxNo       

            

2021                   oral                  

                    15 mg  tablet                    active                     

    for 30 day(s)   

            

 

                Effexor XR                   014797                   RxNorm    

               

2019                   oral                  

                          150 mg  capsule,extended release 24hr                 

   completed              

                                              for 30 day(s)                

 

                buspirone                   107206                   RxNorm     

              

2019                   oral                  

                    15 mg  tablet                    completed                  

       for 90 day(s)

               

 

                Effexor XR                   795146                   RxNorm    

               

2018                   oral                  

                          75 mg  capsule,extended release 24hr                  

  completed               

                                              for 30 day(s)                

 

                Effexor XR                   026802                   RxNorm    

               

2018                   oral                  

                          75 mg  capsule,extended release 24hr                  

  completed               

                                                             

 

                Effexor XR                   536193                   RxNorm    

               

2019                   2019-10-24                   oral                  

                          150 mg  capsule,extended release 24hr                 

   completed              

                                              for 30 day(s)                

 

                venlafaxine                   008716                   RxNorm   

                

2020                   2020-10-18                   oral                  

                          37.5 mg  capsule,extended release 24hr                

    completed             

                                              for 30 day(s)                

 

                Effexor XR                   [id number]                   RxNorm    

               

2018                   oral                  

                          37.5 mg  capsule,extended release 24hr                

    completed             

                                                             

 

                Effexor XR                   926695                   RxNorm    

               

2021                   2021-02-15                   oral                  

                          75 mg  capsule,extended release 24hr                  

  completed               

                                              for 30 day(s)                

 

                Effexor XR                   767784                   RxNorm    

               

2020                   oral                  

                          37.5 mg  capsule,extended release 24hr                

    completed             

                                              for 30 day(s)                

 

                Effexor XR                   883850                   RxNorm    

               

2018                   oral                  

                          37.5 mg  capsule,extended release 24hr                

    completed             

                                                             

 

                Effexor XR                   988894                   RxNorm    

               

2021                   oral                  

                          150 mg  capsule,extended release 24hr                 

   active                 

                                              for 30 day(s)                

 

                    hydroxyzine HCl                   963859                   R

xNorm               

                    2020                   or

al               

                    25 mg  tablet                    completed                  

       for 30 

day(s)                

 

                    bupropion HCl                   263708                   RxN

orm                 

                    2019                   or

al                 

                          300 mg  tablet extended release 24 hr                 

   completed             

                                              for 30 day(s)                

 

                Effexor XR                   521984                   RxNorm    

               

2018                   oral                  

                          37.5 mg  capsule,extended release 24hr                

    completed             

                                                             

 

                venlafaxine                   668916                   RxNorm   

                

2020                   oral                  

                          37.5 mg  capsule,extended release 24hr                

    completed             

                                              for 30 day(s)                

 

                    hydroxyzine HCl                   036858                   R

xNorm               

                    2021                   or

al               

                    25 mg  tablet                    active                     

    for 30 day(s)

               

 

                Effexor XR                   [id number]                   RxNorm    

               

2018                   oral                  

                          37.5 mg  capsule,extended release 24hr                

    completed             

                                                             

 

                    hydroxyzine HCl                   484785                   R

xNorm               

                    2019                   or

al               

                    25 mg  tablet                    completed                  

       for 30 

day(s)                

 

                Effexor XR                   [id number]                   RxNorm    

               

2020                   oral                  

                          150 mg  capsule,extended release 24hr                 

   completed              

                                              for 30 day(s)                

 

                Effexor XR                   216545                   RxNorm    

               

2018                   oral                  

                          75 mg  capsule,extended release 24hr                  

  completed               

                                                             

 

                Effexor XR                   107475                   RxNorm    

               

2018                   oral                  

                          75 mg  capsule,extended release 24hr                  

  completed               

                                                             

 

                venlafaxine                   265509                   RxNorm   

                

2020                   oral                  

                          150 mg  capsule,extended release 24hr                 

   completed              

                                              for 30 day(s)                

 

                    Wellbutrin XL                   297340                   RxN

orm                 

                    2019                   or

al                 

                          300 mg  tablet extended release 24 hr                 

   completed             

                                              for 30 day(s)                

 

                venlafaxine                   845589                   RxNorm   

                

2019                   oral                  

                          37.5 mg  capsule,extended release 24hr                

    completed             

                                              for 30 day(s)                

 

                Effexor XR                   252132                   RxNorm    

               

2018                   oral                  

                          75 mg  capsule,extended release 24hr                  

  completed               

                                              for 30 day(s)                



                                                                                
                                                                                
                                                                                
                                                                                
                                                                                
                                                                                
                                                                                
                                                                                
                                                                                
                                                                                
    



Problems

                      



                    Problem Name                   Code                   CodeSy

stem                

                    Alternate Code                   Alternate CodeSystem       

            Start 

Date                   End Date                   Status                   

Narrative                

 

                     Generalized anxiety disorder                   [id number]    

               

SNOMED-CT                                                           2021  

  

                                        Active                                  

 

 

                           Recurrent depressive disorder, current episode modera

te                   

[id number]                   SNOMED-CT                                           

 

                    2017                                       Active     

        

                                                        



                                                                                
                                           



Relevant diagnostic tests/laboratory data Narrative

          No Information                                                        
                 



Procedures

                      



                    Procedure Name                   Code                   Code

System              

                    Target Site                   Date of Procedure             

      Status    

                          Service Delivery Location                   Device Cod

e           

                          Device Name                   Device UID              

  

 

                          Psychotherapy, 45 minutes with patient                

   [id number]               

                    SNOMED-CT                    ()                   2017

                 

                          completed                   [address], [id number]  [id number]                                            

       

                                                        

 

                          Psychotherapy, 45 minutes with patient                

   [id number]               

                    SNOMED-CT                    ()                   2017

                 

                          completed                   [address], [id number]  [id number]                                            

       

                                                        

 

                          Psychotherapy, 45 minutes with patient                

   [id number]               

                    SNOMED-CT                    ()                   2017

                 

                          completed                   [address], [id number]  [id number]                                            

       

                                                        

 

                          Psychotherapy, 45 minutes with patient                

   [id number]               

                    SNOMED-CT                    ()                   2017-10-16

                 

                          completed                   [facility], [id number]  [id number]                                            

       

                                                        

 

                          Psychotherapy, 45 minutes with patient                

   [id number]               

                    SNOMED-CT                    ()                   2017-10-31

                 

                          completed                   [address], [id number]  [id number]                                            

       

                                                        

 

                          Psychotherapy, 45 minutes with patient                

   [id number]               

                    SNOMED-CT                    ()                   2017

                 

                          completed                   [address], [id number]  [id number]                                            

       

                                                        

 

                          Psychotherapy, 45 minutes with patient                

   [id number]               

                    SNOMED-CT                    ()                   2021

                 

                          completed                   [address], [id number]  [id number]                                            

       

                                                        

 

                          Psychotherapy, 45 minutes with patient                

   [id number]               

                    SNOMED-CT                    ()                   2021

                 

                          completed                   [address], [id number]  [id number]                                            

       

                                                        

 

                          Psychotherapy, 45 minutes with patient                

   [id number]               

                    SNOMED-CT                    ()                   2021

                 

                          completed                   [address], [id number]  [id number]                                            

       

                                                        

 

                          Psychotherapy, 45 minutes with patient                

   [id number]               

                    SNOMED-CT                    ()                   2021

                 

                          completed                   [address], [id number]  [id number]                                            

       

                                                        

 

                          Psychotherapy, 45 minutes with patient                

   [id number]               

                    SNOMED-CT                    ()                   2021

                 

                          completed                   [address], [id number]  [id number]                                            

       

                                                        

 

                          Psychotherapy, 45 minutes with patient                

   [id number]               

                    SNOMED-CT                    ()                   2021

                 

                          completed                   [address], [id number]  [id number]                                            

       

                                                        

 

                          Psychotherapy, 45 minutes with patient                

   [id number]               

                    SNOMED-CT                    ()                   2021

                 

                          completed                   [address], [id number]  [id number]                                            

       

                                                        

 

                          Psychotherapy, 45 minutes with patient                

   [id number]               

                    SNOMED-CT                    ()                   2021

                 

                          completed                   [address], [id number]  [id number]                                            

       

                                                        

 

                          Psychotherapy, 45 minutes with patient                

   [id number]               

                    SNOMED-CT                    ()                   2021

                 

                          completed                   [address], [id number]  [id number]                                            

       

                                                        

 

                          Psychotherapy, 45 minutes with patient                

   [id number]               

                    SNOMED-CT                    ()                   2021

                 

                          completed                   [address], [id number]  [id number]                                            

       

                                                        

 

                          Psychotherapy, 45 minutes with patient                

   [id number]               

                    SNOMED-CT                    ()                   2021

                 

                          completed                   [address], [id number]  [id number]                                            

       

                                                        

 

                          Psychotherapy, 45 minutes with patient                

   [id number]               

                    SNOMED-CT                    ()                   2021

                 

                          completed                   [address], [id number]  [id number]                                            

       

                                                        

 

                          Psychotherapy, 45 minutes with patient                

   [id number]               

                    SNOMED-CT                    ()                   2021

                 

                          completed                   [address], [id number]  [id number]                                            

       

                                                        

 

                          Psychotherapy, 45 minutes with patient                

   [id number]               

                    SNOMED-CT                    ()                   2020-10-08

                 

                          completed                   [address], [id number]  [id number]                                            

       

                                                        

 

                          Psychotherapy, 45 minutes with patient                

   [id number]               

                    SNOMED-CT                    ()                   2020-10-29

                 

                          completed                   [address], 510751350  [id number]                                            

       

                                                        

 

                          Psychotherapy, 45 minutes with patient                

   [id number]               

                    SNOMED-CT                    ()                   2020

                 

                          completed                   [address], [id number]  [phone]                                            

       

                                                        

 

                          Psychotherapy, 45 minutes with patient                

   [id number]               

                    SNOMED-CT                    ()                   2020

                 

                          completed                   [address], [id number]  [id number]                                            

       

                                                        

 

                          Psychotherapy, 45 minutes with patient                

   [id number]               

                    SNOMED-CT                    ()                   2020

                 

                          completed                   [address], [id number]  [id number]                                            

       

                                                        

 

                          Psychotherapy, 45 minutes with patient                

   [id number]               

                    SNOMED-CT                    ()                   2020

                 

                          completed                   [address], [id number]  [id number]                                            

       

                                                        

 

                          Psychotherapy, 45 minutes with patient                

   [id number]               

                    SNOMED-CT                    ()                   2020

                 

                          completed                   [address], [id number]  [id number]                                            

       

                                                        

 

                          Psychotherapy, 45 minutes with patient                

   [id number]               

                    SNOMED-CT                    ()                   2020

                 

                          completed                   [address], [id number]  [id number]                                            

       

                                                        

 

                          Psychotherapy, 45 minutes with patient                

   [id number]               

                    SNOMED-CT                    ()                   2020

                 

                          completed                   [address], [id number]  [id number]                                            

       

                                                        

 

                          Psychotherapy, 45 minutes with patient                

   [id number]               

                    SNOMED-CT                    ()                   2020

                 

                          completed                   [address], [id number]  [id number]                                            

       

                                                        

 

                          Psychotherapy, 45 minutes with patient                

   [id number]               

                    SNOMED-CT                    ()                   2020

                 

                          completed                   [address], [id number]  [id number]                                            

       

                                                        

 

                          Psychotherapy, 45 minutes with patient                

   [id number]               

                    SNOMED-CT                    ()                   2020

                 

                          completed                   [address], [id number]  [id number]                                            

       

                                                        

 

                          Psychotherapy, 45 minutes with patient                

   [id number]               

                    SNOMED-CT                    ()                   2019

                 

                          completed                   [address], [id number]  [id number]                                            

       

                                                        

 

                          Psychotherapy, 45 minutes with patient                

   [id number]               

                    SNOMED-CT                    ()                   2019-10-22

                 

                          completed                   [address], [id number]  [id number]                                            

       

                                                        

 

                          Psychotherapy, 45 minutes with patient                

   [id number]               

                    SNOMED-CT                    ()                   2019

                 

                          completed                   [address], [id number]  [id number]                                            

       

                                                        

 

                          Psychotherapy, 45 minutes with patient                

   [id number]               

                    SNOMED-CT                    ()                   2020

                 

                          completed                   [address], [id number]  [id number]                                            

       

                                                        

 

                          Psychotherapy, 45 minutes with patient                

   [id number]               

                    SNOMED-CT                    ()                   2020

                 

                          completed                   [address], [id number]  [id number]                                            

       

                                                        

 

                          Psychotherapy, 45 minutes with patient                

   [id number]               

                    SNOMED-CT                    ()                   2020

                 

                          completed                   [address], [id number]  [id number]                                            

       

                                                        

 

                          Psychotherapy, 45 minutes with patient                

   [id number]               

                    SNOMED-CT                    ()                   2019

                 

                          completed                   [address], [id number] 

[id number]                                                                      

  

  

 

                          Psychotherapy, 45 minutes with patient                

   [id number]               

                    SNOMED-CT                    ()                   2019-06-10

                 

                          completed                   [address], [id number]  [id number]                                            

       

                                                        

 

                          Psychotherapy, 45 minutes with patient                

   [id number]               

                    SNOMED-CT                    ()                   2019

                 

                          completed                   [address], [id number]  [id number]                                            

       

                                                        

 

                          Psychotherapy, 45 minutes with patient                

   [id number]               

                    SNOMED-CT                    ()                   2019

                 

                          completed                   [address], [id number]  [id number]                                            

       

                                                        

 

                          Psychotherapy, 45 minutes with patient                

   [id number]               

                    SNOMED-CT                    ()                   2019

                 

                          completed                   [address], [id number]  [id number]                                            

       

                                                        

 

                          Psychotherapy, 45 minutes with patient                

   [id number]               

                    SNOMED-CT                    ()                   2019

                 

                          completed                   [address], [id number]  [id number]                                            

       

                                                        

 

                          Psychotherapy, 45 minutes with patient                

   [id number]               

                    SNOMED-CT                    ()                   2019

                 

                          completed                   [address], [id number]  [id number]                                            

       

                                                        

 

                          Psychotherapy, 45 minutes with patient                

   [id number]               

                    SNOMED-CT                    ()                   2019

                 

                          completed                   [address], [id number]  [id number]                                            

       

                                                        

 

                          Psychotherapy, 45 minutes with patient                

   [id number]               

                    SNOMED-CT                    ()                   2019

                 

                          completed                   [address], [id number]  [id number]                                            

       

                                                        

 

                          Psychotherapy, 45 minutes with patient                

   [id number]               

                    SNOMED-CT                    ()                   2019-03-15

                 

                          completed                   [address], [id number]  [id number]                                            

       

                                                        

 

                          Psychotherapy, 45 minutes with patient                

   [id number]               

                    SNOMED-CT                    ()                   2019-04-15

                 

                          completed                   [address], [id number]  [id number]                                            

       

                                                        

 

                          Psychotherapy, 45 minutes with patient                

   [id number]               

                    SNOMED-CT                    ()                   2019

                 

                          completed                   [address], [id number]  [id number]                                            

       

                                                        

 

                          Psychotherapy, 45 minutes with patient                

   [id number]               

                    SNOMED-CT                    ()                   2018

                 

                          completed                   [address], [id number]  [id number]                                            

       

                                                        

 

                          Psychotherapy, 45 minutes with patient                

   [id number]               

                    SNOMED-CT                    ()                   2018

                 

                          completed                   [address], [id number]  [id number]                                            

       

                                                        

 

                          Psychotherapy, 45 minutes with patient                

   [id number]               

                    SNOMED-CT                    ()                   2018-10-16

                 

                          completed                   [address], [id number]  [id number]                                            

       

                                                        

 

                          Psychotherapy, 45 minutes with patient                

   [id number]               

                    SNOMED-CT                    ()                   2018

                 

                          completed                   [address], [id number]  [id number]                                            

       

                                                        

 

                          Psychotherapy, 45 minutes with patient                

   [id number]               

                    SNOMED-CT                    ()                   2018-12-10

                 

                          completed                   [address], [id number]  [id number]                                            

       

                                                        

 

                          Psychotherapy, 45 minutes with patient                

   [id number]               

                    SNOMED-CT                    ()                   2018

                 

                          completed                   [address], [id number]  [id number]                                            

       

                                                        

 

                          Psychotherapy, 45 minutes with patient                

   [id number]               

                    SNOMED-CT                    ()                   2018

                 

                          completed                   [address], [id number]  [id number]                                            

       

                                                        

 

                          Psychotherapy, 45 minutes with patient                

   [id number]               

                    SNOMED-CT                    ()                   2018

                 

                          completed                   [address], [id number]  [id number]                                            

       

                                                        

 

                          Psychotherapy, 45 minutes with patient                

   [id number]               

                    SNOMED-CT                    ()                   2018

                 

                          completed                   [address], [id number]  [id number]                                            

       

                                                        

 

                          Psychotherapy, 45 minutes with patient                

   [id number]               

                    SNOMED-CT                    ()                   2018

                 

                          completed                   [address], [id number]  [id number]                                            

       

                                                        

 

                          Psychotherapy, 45 minutes with patient                

   [id number]               

                    SNOMED-CT                    ()                   2018

                 

                          completed                   [address], [id number]  [id number]                                            

       

                                                        

 

                          Psychotherapy, 45 minutes with patient                

   [id number]               

                    SNOMED-CT                    ()                   2018

                 

                          completed                   [address], [id number]  [id number]                                            

       

                                                        

 

                          Psychotherapy, 45 minutes with patient                

   [id number]               

                    SNOMED-CT                    ()                   2017

                 

                          completed                   [address], [id number]  [id number]                                            

       

                                                        

 

                          Psychotherapy, 45 minutes with patient                

   [id number]               

                    SNOMED-CT                    ()                   2017

                 

                          completed                   [address], [id number]  [id number]                                            

       

                                                        

 

                          Psychotherapy, 45 minutes with patient                

   [id number]               

                    SNOMED-CT                    ()                   2018

                 

                          completed                   [address], [id number]  [id number]                                            

       

                                                        

 

                          Psychotherapy, 45 minutes with patient                

   [id number]               

                    SNOMED-CT                    ()                   2018

                 

                          completed                   [address], [id number]  [id number]                                            

       

                                                        

 

                          Psychotherapy, 45 minutes with patient                

   [id number]               

                    SNOMED-CT                    ()                   2018

                 

                          completed                   [address], 164956452  [id number]                                            

       

                                                        

 

                          Psychotherapy, 45 minutes with patient                

   [id number]               

                    SNOMED-CT                    ()                   2018

                 

                          completed                   [address], [id number]  [phone]                                            

       

                                                        

 

                    Initial Psychiatric Evaluation                   [id number]  

                 

SNOMED-CT                    ()                   2017                   

completed                               [address], [id number]  [id number]                                            

       

                                                        

 

                          Health Monitoring / Risk Reduction Counseling - [facility]

ed                   

[id number]                   SNOMED-CT                    ()                   

2017                   completed                   [address], [id number]  [id number]                   

                                                                    

 

                    Est. Patient - E&M Intermediate                   [id number] 

                  

SNOMED-CT                    ()                   2018                   

completed                               [address], [id number]  [id number]                                            

       

                                                        

 

                    Est. Patient - E&M Intermediate                   [id number] 

                  

SNOMED-CT                    ()                   2018                   

completed                               [address], [id number]  [id number]                                            

       

                                                        

 

                    Est. Patient - E&M Intermediate                   [id number] 

                  

SNOMED-CT                    ()                   2019-01-15                   

completed                               [address], [id number]  [id number]                                            

       

                                                        

 

                    Est. Patient - E&M Intermediate                   [id number] 

                  

SNOMED-CT                    ()                   2019                   

completed                               [address], 383279124  [id number]                                            

       

                                                        

 

                    Est. Patient - E&M Intermediate                   [id number] 

                  

SNOMED-CT                    ()                   2019                   

completed                               [address], [id number]  [id number]                                            

       

                                                        

 

                    Est. Patient - E&M Intermediate                   [id number] 

                  

SNOMED-CT                    ()                   2019                   

completed                               [address], 136843779  [id number]                                            

       

                                                        

 

                    Est. Patient - E&M Intermediate                   [id number] 

                  

SNOMED-CT                    ()                   2020                   

completed                               [address], [id number]  [id number]                                            

       

                                                        

 

                    Est. Patient - E&M Intermediate                   [id number] 

                  

SNOMED-CT                    ()                   2020                   

completed                               [address], [id number]  [id number]                                            

       

                                                        

 

                    Est. Patient - E&M Intermediate                   [id number] 

                  

SNOMED-CT                    ()                   2020                   

completed                               [address], [id number]  [id number]                                            

       

                                                        

 

                    Est. Patient - E&M Intermediate                   [id number] 

                  

SNOMED-CT                    ()                   2019                   

completed                               [address], [id number]  [id number]                                            

       

                                                        

 

                    Est. Patient - E&M Intermediate                   [id number] 

                  

SNOMED-CT                    ()                   2019                   

completed                               [address], [id number]  [id number]                                            

       

                                                        

 

                    Est. Patient - E&M Intermediate                   [id number] 

                  

SNOMED-CT                    ()                   2019                   

completed                               [address], [id number]  [id number]                                            

       

                                                        

 

                    Est. Patient - E&M Intermediate                   [id number] 

                  

SNOMED-CT                    ()                   2019                   

completed                               [address], [id number]  [id number]                                            

       

                                                        

 

                    Est. Patient - E&M Intermediate                   [id number] 

                  

SNOMED-CT                    ()                   2019-10-08                   

completed                               [address], [id number]  [id number]                                            

       

                                                        

 

                    Est. Patient - E&M Intermediate                   [id number] 

                  

SNOMED-CT                    ()                   2019                   

completed                               [address], [id number]  [id number]                                            

       

                                                        

 

                    Est. Patient - E&M Brief                   [id number]        

           SNOMED-CT

                     ()                   2018                   completed

  

                                        [address], 

922103633  [id number]                                                           

  

             

 

                    Est. Patient - E&M Brief                   [id number]        

           SNOMED-CT

                     ()                   2019                   completed

  

                                        [address], 

960036954  [id number]                                                           

  

             

 

                    Est. Patient - E&M Brief                   [id number]        

           SNOMED-CT

                     ()                   2020                   completed

  

                                        [address], 

[id number]  [id number]                                                           

  

             

 

                    Est. Patient - E&M Brief                   [id number]        

           SNOMED-CT

                     ()                   2020                   completed

  

                                        [address], 

320630608  [id number]                                                           

  

             

 

                    Est. Patient - E&M Brief                   [id number]        

           SNOMED-CT

                     ()                   2020                   completed

  

                                        [address], 

278408027  [id number]                                                           

  

             

 

                    Est. Patient - E&M Brief                   [id number]        

           SNOMED-CT

                     ()                   2020                   completed

  

                                        [address], 

563988753  [id number]                                                           

  

             

 

                    Est. Patient - E&M Brief                   [id number]        

           SNOMED-CT

                     ()                   2020                   completed

  

                                        [address], 

735684912  [id number]                                                           

  

             

 

                    Est. Patient - E&M Brief                   [id number]        

           SNOMED-CT

                     ()                   2021                   completed

  

                                        [address], 

[id number]  [id number]                                                           

  

             

 

                    Est. Patient - E&M Brief                   [id number]        

           SNOMED-CT

                     ()                   2021-02-15                   completed

  

                                        [address], 

226405987  [id number]                                                           

  

             

 

                    Est. Patient - E&M Expanded                   [id number]     

              

SNOMED-CT                    ()                   2018                   

completed                               [address], [id number]  [id number]                                            

       

                                                        

 

                    Est. Patient - E&M Expanded                   [id number]     

              

SNOMED-CT                    ()                   2018                   

completed                               [address], 152571905  [id number]                                            

       

                                                        

 

                    Est. Patient - E&M Expanded                   [id number]     

              

SNOMED-CT                    ()                   2018                   

completed                               [address], [id number]  [id number]                                            

       

                                                        

 

                    Est. Patient - E&M Expanded                   [id number]     

              

SNOMED-CT                    ()                   2018                   

completed                               [address], [id number]  [id number]                                            

       

                                                        

 

                    Est. Patient - E&M Expanded                   [id number]     

              

SNOMED-CT                    ()                   2018                   

completed                               [address], [id number]  [id number]                                            

       

                                                        

 

                    Est. Patient - E&M Expanded                   [id number]     

              

SNOMED-CT                    ()                   2018                   

completed                               [address], [id number]  [id number]                                            

       

                                                        

 

                    Est. Patient - E&M Expanded                   [id number]     

              

SNOMED-CT                    ()                   2018                   

completed                               [address], 662336013  [id number]                                            

       

                                                        

 

                    Est. Patient - E&M Expanded                   [id number]     

              

SNOMED-CT                    ()                   2021                   

completed                               [address], [id number]  [id number]                                            

       

                                                        

 

                    Est. Patient - E&M Expanded                   [id number]     

              

SNOMED-CT                    ()                   2021                   

completed                               [address], 527741929  [id number]                                            

       

                                                        

 

                    Est. Patient - E&M Expanded                   [id number]     

              

SNOMED-CT                    ()                   2021                   

completed                               [address], [id number]  [id number]                                            

       

                                                        

 

                    Est. Patient - E&M Expanded                   [id number]     

              

SNOMED-CT                    ()                   2021                   

completed                               [address], 641385524  [id number]                                            

       

                                                        

 

                    Individual Psychotherapy                   [id number]         

          SNOMED-CT 

                     ()                   2018                   completed

   

                                        [address], 

[id number]  [id number]                                                           

  

             

 

                    Individual Psychotherapy                   [id number]         

          SNOMED-CT 

                     ()                   2020                   completed

   

                                        [address], 

[id number]  [id number]                                                           

  

             

 

                    Individual Psychotherapy                   [id number]         

          SNOMED-CT 

                     ()                   2021                   completed

   

                                        [address], 

[id number]  [id number]                                                           

  

             

 

                    Individual Psychotherapy                   [id number]         

          SNOMED-CT 

                     ()                   2021-02-10                   completed

   

                                        [address], 

676055541  [id number]                                                           

  

             

 

                    Individual Psychotherapy                   [id number]         

          SNOMED-CT 

                     ()                   2017                   completed

   

                                        [address], 

[id number]  [id number]                                                           

  

             

 

                    Individual Psychotherapy                   [id number]         

          SNOMED-CT 

                     ()                   2017                   completed

   

                                        [address], 

[id number]  [id number]                                                           

  

             

 

                    Individual Psychotherapy                   [id number]         

          SNOMED-CT 

                     ()                   2017                   completed

   

                                        [address], 

[id number]  [id number]                                                           

  

             

 

                    Individual Psychotherapy                   [id number]         

          SNOMED-CT 

                     ()                   2017-10-16                   completed

   

                                        [address], 

[id number]  [id number]                                                           

  

             

 

                    Individual Psychotherapy                   [id number]         

          SNOMED-CT 

                     ()                   2017-10-31                   completed

   

                                        [address], 

[id number]  [id number]                                                           

  

             

 

                    Individual Psychotherapy                   [id number]         

          SNOMED-CT 

                     ()                   2017                   completed

   

                                        [address], 

[id number]  [id number]                                                           

  

             

 

                    Individual Psychotherapy                   [id number]         

          SNOMED-CT 

                     ()                   2021                   completed

   

                                        [address], 

[id number]  [id number]                                                           

  

             

 

                    Individual Psychotherapy                   [id number]         

          SNOMED-CT 

                     ()                   2021                   completed

   

                                        [address], 

[id number]  [id number]                                                           

  

             

 

                    Individual Psychotherapy                   [id number]         

          SNOMED-CT 

                     ()                   2021                   completed

   

                                        [address], 

[id number]  [id number]                                                           

  

             

 

                    Individual Psychotherapy                   [id number]         

          SNOMED-CT 

                     ()                   2021                   completed

   

                                        [address], 

[id number]  [id number]                                                           

  

             

 

                    Individual Psychotherapy                   [id number]         

          SNOMED-CT 

                     ()                   2021                   completed

   

                                        [address], 

[id number]  [id number]                                                           

  

             

 

                    Individual Psychotherapy                   [id number]         

          SNOMED-CT 

                     ()                   2021                   completed

   

                                        [address], 

[id number]  [id number]                                                           

  

             

 

                    Individual Psychotherapy                   [id number]         

          SNOMED-CT 

                     ()                   2021                   completed

   

                                        [address], 

[id number]  [id number]                                                           

  

             

 

                    Individual Psychotherapy                   [id number]         

          SNOMED-CT 

                     ()                   2021                   completed

   

                                        [address], 

[id number]  [id number]                                                           

  

             

 

                    Individual Psychotherapy                   [id number]         

          SNOMED-CT 

                     ()                   2021                   completed

   

                                        [address], 

[id number]  [id number]                                                           

  

             

 

                    Individual Psychotherapy                   [id number]         

          SNOMED-CT 

                     ()                   2021                   completed

   

                                        [address], 

[id number]  [id number]                                                           

  

             

 

                    Individual Psychotherapy                   [id number]         

          SNOMED-CT 

                     ()                   2021                   completed

   

                                        [address], 

[id number]  [id number]                                                           

  

             

 

                    Individual Psychotherapy                   [id number]         

          SNOMED-CT 

                     ()                   2021                   completed

   

                                        [address], 

[id number]  [id number]                                                           

  

             

 

                    Individual Psychotherapy                   [id number]         

          SNOMED-CT 

                     ()                   2021                   completed

   

                                        [address], 

[id number]  [id number]                                                           

  

             

 

                    Individual Psychotherapy                   [id number]         

          SNOMED-CT 

                     ()                   2020-10-08                   completed

   

                                        [address], 

[id number]  [id number]                                                           

  

             

 

                    Individual Psychotherapy                   [id number]         

          SNOMED-CT 

                     ()                   2020-10-29                   completed

   

                                        [address], 

[id number]  [id number]                                                           

  

             

 

                    Individual Psychotherapy                   [id number]         

          SNOMED-CT 

                     ()                   2020                   completed

   

                                        [address], 

[id number]  [id number]                                                           

  

             

 

                    Individual Psychotherapy                   [id number]         

          SNOMED-CT 

                     ()                   2020                   completed

   

                                        [address], 

[id number]  [id number]                                                           

  

             

 

                    Individual Psychotherapy                   [id number]         

          SNOMED-CT 

                     ()                   2020                   completed

   

                                        [address], 

[id number]  [id number]                                                           

  

             

 

                    Individual Psychotherapy                   [id number]         

          SNOMED-CT 

                     ()                   2020                   completed

   

                                        [address], 

[id number]  [id number]                                                           

  

             

 

                    Individual Psychotherapy                   [id number]         

          SNOMED-CT 

                     ()                   2020                   completed

   

                                        [address], 

[id number]  [id number]                                                           

  

             

 

                    Individual Psychotherapy                   [id number]         

          SNOMED-CT 

                     ()                   2020                   completed

   

                                        [address], 

[id number]  [id number]                                                           

  

             

 

                    Individual Psychotherapy                   [id number]         

          SNOMED-CT 

                     ()                   2020                   completed

   

                                        [address], 

[id number]  [id number]                                                           

  

             

 

                    Individual Psychotherapy                   [id number]         

          SNOMED-CT 

                     ()                   2020                   completed

   

                                        [address], 

[id number]  [id number]                                                           

  

             

 

                    Individual Psychotherapy                   [id number]         

          SNOMED-CT 

                     ()                   2020                   completed

   

                                        [address], 

[id number]  [id number]                                                           

  

             

 

                    Individual Psychotherapy                   [id number]         

          SNOMED-CT 

                     ()                   2020                   completed

   

                                        [address], 

[id number]  [id number]                                                           

  

             

 

                    Individual Psychotherapy                   [id number]         

          SNOMED-CT 

                     ()                   2019                   completed

   

                                        [address], 

[id number]  [id number]                                                           

  

             

 

                    Individual Psychotherapy                   [id number]         

          SNOMED-CT 

                     ()                   2019-10-22                   completed

   

                                        [address], 

[id number]  [id number]                                                           

  

             

 

                    Individual Psychotherapy                   [id number]         

          SNOMED-CT 

                     ()                   2019                   completed

   

                                        [address] [place], 

[id number]  [id number]                                                           

  

             

 

                    Individual Psychotherapy                   [id number]         

          SNOMED-CT 

                     ()                   2020                   completed

   

                                        [address], 

[id number]  [id number]                                                           

  

             

 

                    Individual Psychotherapy                   [id number]         

          SNOMED-CT 

                     ()                   2020                   completed

   

                                        [address], 

[id number]  [id number]                                                           

  

             

 

                    Individual Psychotherapy                   [id number]         

          SNOMED-CT 

                     ()                   2020                   completed

   

                                        [address], 

[id number]  [id number]                                                           

  

             

 

                    Individual Psychotherapy                   [id number]         

          SNOMED-CT 

                     ()                   2019                   completed

   

                                        [address],

 [id number]  [id number]   

                                                                            

 

                    Individual Psychotherapy                   [id number]         

          SNOMED-CT 

                     ()                   2019-06-10                   completed

   

                                        [address], 

[id number]  [id number]                                                           

  

             

 

                    Individual Psychotherapy                   [id number]         

          SNOMED-CT 

                     ()                   2019                   completed

   

                                        [address], 

[id number]  [id number]                                                           

  

             

 

                    Individual Psychotherapy                   [id number]         

          SNOMED-CT 

                     ()                   2019                   completed

   

                                        [address], 

[id number]  [id number]                                                           

  

             

 

                    Individual Psychotherapy                   [id number]         

          SNOMED-CT 

                     ()                   2019                   completed

   

                                        [address], 

[id number]  [id number]                                                           

  

             

 

                    Individual Psychotherapy                   [id number]         

          SNOMED-CT 

                     ()                   2019                   completed

   

                                        [address], 

[id number]  [id number]                                                           

  

             

 

                    Individual Psychotherapy                   [id number]         

          SNOMED-CT 

                     ()                   2019                   completed

   

                                        [address], 

[id number]  [id number]                                                           

  

             

 

                    Individual Psychotherapy                   [id number]         

          SNOMED-CT 

                     ()                   2019                   completed

   

                                        [address], 

[id number]  [id number]                                                           

  

             

 

                    Individual Psychotherapy                   [id number]         

          SNOMED-CT 

                     ()                   2019                   completed

   

                                        [address], 

[id number]  [id number]                                                           

  

             

 

                    Individual Psychotherapy                   [id number]         

          SNOMED-CT 

                     ()                   2019-03-15                   completed

   

                                        [address], 

[id number]  [id number]                                                           

  

             

 

                    Individual Psychotherapy                   [id number]         

          SNOMED-CT 

                     ()                   2019-04-15                   completed

   

                                        [address], 

[id number]  [id number]                                                           

  

             

 

                    Individual Psychotherapy                   [id number]         

          SNOMED-CT 

                     ()                   2019                   completed

   

                                        [address], 

[id number]  [id number]                                                           

  

             

 

                    Individual Psychotherapy                   [id number]         

          SNOMED-CT 

                     ()                   2018                   completed

   

                                        [address], 

[id number]  [id number]                                                           

  

             

 

                    Individual Psychotherapy                   [id number]         

          SNOMED-CT 

                     ()                   2018                   completed

   

                                        [address], 

[id number]  [id number]                                                           

  

             

 

                    Individual Psychotherapy                   [id number]         

          SNOMED-CT 

                     ()                   2018-10-16                   completed

   

                                        [address], 

[id number]  [id number]                                                           

  

             

 

                    Individual Psychotherapy                   [id number]         

          SNOMED-CT 

                     ()                   2018                   completed

   

                                        [address], 

[id number]  [id number]                                                           

  

             

 

                    Individual Psychotherapy                   [id number]         

          SNOMED-CT 

                     ()                   2018-12-10                   completed

   

                                        [address], 

[id number]  [id number]                                                           

  

             

 

                    Individual Psychotherapy                   [id number]         

          SNOMED-CT 

                     ()                   2018                   completed

   

                                        [address], 

[id number]  [id number]                                                           

  

             

 

                    Individual Psychotherapy                   [id number]         

          SNOMED-CT 

                     ()                   2018                   completed

   

                                        [address], 

[id number]  [id number]                                                           

  

             

 

                    Individual Psychotherapy                   [id number]         

          SNOMED-CT 

                     ()                   2018                   completed

   

                                        [address], 

[id number]  [id number]                                                           

  

             

 

                    Individual Psychotherapy                   [id number]         

          SNOMED-CT 

                     ()                   2018                   completed

   

                                        [address], 

[id number]  [id number]                                                           

  

             

 

                    Individual Psychotherapy                   [id number]         

          SNOMED-CT 

                     ()                   2018                   completed

   

                                        [address], 

[id number]  [id number]                                                           

  

             

 

                    Individual Psychotherapy                   [id number]         

          SNOMED-CT 

                     ()                   2018                   completed

   

                                        [address], 

[id number]  [id number]                                                           

  

             

 

                    Individual Psychotherapy                   [id number]         

          SNOMED-CT 

                     ()                   2018                   completed

   

                                        [address], 

[id number]  [id number]                                                           

  

             

 

                    Individual Psychotherapy                   [id number]         

          SNOMED-CT 

                     ()                   2017                   completed

   

                                        [address], 

[id number]  [id number]                                                           

  

             

 

                    Individual Psychotherapy                   [id number]         

          SNOMED-CT 

                     ()                   2017                   completed

   

                                        [address], 

[id number]  [id number]                                                           

  

             

 

                    Individual Psychotherapy                   [id number]         

          SNOMED-CT 

                     ()                   2018                   completed

   

                                        [address], 

[id number]  [id number]                                                           

  

             

 

                    Individual Psychotherapy                   [id number]         

          SNOMED-CT 

                     ()                   2018                   completed

   

                                        [address], 

[id number]  [id number]                                                           

  

             

 

                    Individual Psychotherapy                   [id number]         

          SNOMED-CT 

                     ()                   2018                   completed

   

                                        [address], 

[id number]  [id number]                                                           

  

             

 

                    Individual Psychotherapy                   [id number]         

          SNOMED-CT 

                     ()                   2018                   completed

   

                                        [address], 

[id number]  [id number]                                                           

  

             

 

                          Psychiatric Diagnostic Evaluation without medical serv

ices                   

[id number]                   SNOMED-CT                    ()                   

2017                   completed                   [address], [id number]  [id number]                   

                                                                    

 

                                                      SNOMED-CT                 

   ()           

                    2021                   completed                   [address], [id number]  [id number]           
                                                                            

 

                                                      SNOMED-CT                 

   ()           

                    2017                   completed                   [address], [id number]  [id number]           
                                                                            

 

                                                      SNOMED-CT                 

   ()           

                    2017                   completed                   [address], [id number]  [id number]           
                                                                            

 

                                                      SNOMED-CT                 

   ()           

                    2017                   completed                   [address], [id number]  [id number]           
                                                                            

 

                                                      SNOMED-CT                 

   ()           

                    2017                   completed                   [address], [id number]  [phone]           
                                                                            

 

                                                      SNOMED-CT                 

   ()           

                    2017                   completed                   [address], [id number]  [id number]           
                                                                            

 

                                                      SNOMED-CT                 

   ()           

                    2017-10-16                   completed                   [address], [id number]  [id number]           
                                                                            

 

                                                      SNOMED-CT                 

   ()           

                    2017                   completed                   [address], [id number]  [id number]           
                                                                            

 

                                                      SNOMED-CT                 

   ()           

                    2017                   completed                   [address], [id number]  [id number]           
                                                                            

 

                                                      SNOMED-CT                 

   ()           

                    2017                   completed                   W

C [address], [id number]  [id number]           
                                                                            

 

                                                      SNOMED-CT                 

   ()           

                    2017                   completed                   W

C [address], [id number]  [phone]           
                                                                            

 

                                                      SNOMED-CT                 

   ()           

                    2017-10-31                   completed                   W

C [address], [id number]  [phone]           
                                                                            

 

                                                      SNOMED-CT                 

   ()           

                    2021                   completed                   W

C [address], [id number]  [id number]           
                                                                            

 

                                                      SNOMED-CT                 

   ()           

                    2021                   completed                   W

C [address], [id number]  [id number]           
                                                                            

 

                                                      SNOMED-CT                 

   ()           

                    2021                   completed                   W

C [address], [id number]  [phone]           
                                                                            

 

                                                      SNOMED-CT                 

   ()           

                    2021                   completed                   W

C [address], [id number]  [phone]           
                                                                            

 

                                                      SNOMED-CT                 

   ()           

                    2021-02-10                   completed                   W

C [address], [id number]  [phone]           
                                                                            

 

                                                      SNOMED-CT                 

   ()           

                    2021                   completed                   W

C [address], [id number]  [id number]           
                                                                            

 

                                                      SNOMED-CT                 

   ()           

                    2021                   completed                   W

C [address], [id number]  [id number]           
                                                                            

 

                                                      SNOMED-CT                 

   ()           

                    2021                   completed                   W

C [address], [id number]  [phone]           
                                                                            

 

                                                      SNOMED-CT                 

   ()           

                    2018                   completed                   W

C [address], [id number]  [id number]           
                                                                            

 

                                                      SNOMED-CT                 

   ()           

                    2018                   completed                   [facility]

C [address], [id number]  [phone]           
                                                                            

 

                                                      SNOMED-CT                 

   ()           

                    2018                   completed                   [facility]

C [address], [id number]  [phone]           
                                                                            



                                                                                
                                                                                
                                                                                
                                                                                
                                                                                
                                                                                
                                                                                
                                                                                
                                                                                
                                                                                
                                                                                
                                                                                
                                                                                
                                                                                
                                                                                
                                                                                
                                                                                
                                                                                
                                                                                
                                                                                
                                                                                
                                                                                
                                                                                
                                                                                
                                                                                
                                                 



Encounters/Encounter Diagnoses

                      



                    Encounter Name                   Encounter Code             

      Diagnosis Code

                          Diagnosis Name                   Diagnosis CodeSystem 

        

                          Date of Diagnosis                   Service Delivery L

ocation          

     

 

                          Telehealth Medication Therapy- High Complexity        

           [address]          

                          [id number]                   Recurrent depressive disor

dennise, current 

episode moderate                   SNOMED-CT                   2021       

                                        [facility] [address], [place], [place], 

[id number]                  



                                                                                
                                 



Vital Signs

                      



                Code                   CodeSystem                   Vitals      

             

Date                                    Value                

 

                    8480-6                   [facility]                   Blood Press

ure-Systolic        

                          2020                   90 mm[HG]                

 

                11302-5                   LOINC                   BMI           

        

2020                              41.45 (lb/in2)                

 

                8302-2                   LOINC                   Height         

          

2020                              63 [in_i]                

 

                    8462-4                   [facility]                   Blood Press

ure-Diastolic       

                          2020                   130 mm[HG]               

 

 

                57727-2                   LOINC                   Weight        

           

2020                              234 [lb_av]                

 

                8867-4                   [facility]                   Heart Rate     

              

2020                              101 /min                



                                                                                
              



Social History

                      



                    Element Description                   Description           

        Start Date  

                    End Date                   Code                   CodeSystem

   

                                        AdditionalInfo                

 

                    SexAssignedAtBirth                   Female                 

  1970        

                                        F                   AdministrativeGender

          

                                                        



                                                                                
    



Hospital Discharge Instructions

                                    * 



                                                                                
                                    



Reason For Referral

                      





                                                                                
    



Medical Equipment

                                    *     FDA



                                                                                
                



Assessments

                                    * 



                                                                                
      



Goals Section

                      



                          Goals                     Planned DateTime            

    

 

                                        Kath will report that her level of dep

ression(irritability) is a 3 or less on 

a scale of 1 low and 10 high most days during the treatment period(90 days).    
                                        2017

## 2021-10-16 NOTE — CCD
Transition of Care

                             Created on: 2021



Kath Ken

External Reference #: [id number]

: 1970

Sex: Female



Demographics





                          Address                   [address] [place] [address]

 

                          Home Phone                [phone]

 

                          Preferred Language        English

 

                          Marital Status            Unknown

 

                          Alevism Affiliation     Unknown

 

                          Race                      Unknown

 

                          Additional Race(s)        Unknown



 

                          Ethnic Group              Unknown





Author





                          Author                    Kath Flaherty

 

                          Organization              [facility]

 

                          Address                   Unknown

 

                          Phone                     Unavailable







Support





                Name            Relationship    Address         Phone

 

                    Next Of Kin         Unknown             Unavailable







Care Team Providers





                    Care Team Member Name Role                Phone

 

                    Yoseph Flaherty     PCP                 Unavailable



                                                            



Allergies, Adverse Reactions, Alerts

                      



                    Allergy Substance                   Code                   C

odeSystem           

                    Reaction                   Severity                   Critic

ality        

                          Status                    Start Date                

 

                metformin                                                       

   nausea: high 

blood sugar                   Moderate                                       

Active                                                  



                                                                                
             



Medications

                      



                    Medication                   Medication Code                

   Medication 

CodeSystem                   Start Date                   Stop Date             

                Route                   Dose                   Status           

        

Fill Instructions                

 

                    hydroxyzine HCl                   408461                   R

xNorm               

                    2019                   or

al               

                    25 mg  tablet                    completed                  

       for 30 

day(s)                

 

                    hydroxyzine HCl                   [id number]                   R

xNorm               

                    2021                   or

al               

                    25 mg  tablet                    active                     

    for 30 day(s)

               

 

                Effexor XR                   [id number]                   RxNorm    

               

2018                   oral                  

                          37.5 mg  capsule,extended release 24hr                

    completed             

                                                             

 

                Effexor XR                   [id number]                   RxNorm    

               

2018                   oral                  

                          75 mg  capsule,extended release 24hr                  

  completed               

                                              for 30 day(s)                

 

                Effexor XR                   154549                   RxNorm    

               

2018                   oral                  

                          37.5 mg  capsule,extended release 24hr                

    completed             

                                                             

 

                Effexor XR                   [id number]                   RxNorm    

               

2020                   oral                  

                          150 mg  capsule,extended release 24hr                 

   completed              

                                              for 30 day(s)                

 

                Effexor XR                   [id number]                   RxNorm    

               

2018                   oral                  

                          75 mg  capsule,extended release 24hr                  

  completed               

                                                             

 

                    hydroxyzine HCl                   637089                   R

xNorm               

                    2020                   or

al               

                    25 mg  tablet                    completed                  

       for 45 

day(s)                

 

                Effexor XR                   982709                   RxNorm    

               

2018                   oral                  

                          75 mg  capsule,extended release 24hr                  

  completed               

                                              for 30 day(s)                

 

                    hydroxyzine HCl                   656065                   R

xNorm               

                    2020                   or

al               

                    25 mg  tablet                    completed                  

       for 30 

day(s)                

 

                    hydroxyzine HCl                   [id number]                   R

xNorm               

                    2020                   or

al               

                    25 mg  tablet                    completed                  

       for 30 

day(s)                

 

                venlafaxine                   387203                   RxNorm   

                

2019                   oral                  

                          150 mg  capsule,extended release 24hr                 

   completed              

                                              for 30 day(s)                

 

                venlafaxine                   092581                   RxNorm   

                

2020                   oral                  

                          150 mg  capsule,extended release 24hr                 

   completed              

                                              for 30 day(s)                

 

                Effexor XR                   917000                   RxNorm    

               

2021                   2021-02-15                   oral                  

                          75 mg  capsule,extended release 24hr                  

  completed               

                                              for 30 day(s)                

 

                Effexor XR                   653363                   RxNorm    

               

2019                   oral                  

                          37.5 mg  capsule,extended release 24hr                

    completed             

                                              for 30 day(s)                

 

                Remeron                   [id number]                   RxNorm       

            

2021                   oral                  

                    15 mg  tablet                    completed                  

       for 30 day(s)

               

 

                Effexor XR                   337444                   RxNorm    

               

2019                   oral                  

                          150 mg  capsule,extended release 24hr                 

   completed              

                                              for 30 day(s)                

 

                venlafaxine                   619820                   RxNorm   

                

2020                   2020-10-18                   oral                  

                          37.5 mg  capsule,extended release 24hr                

    completed             

                                              for 30 day(s)                

 

                Effexor XR                   448319                   RxNorm    

               

2019                   oral                  

                          37.5 mg  capsule,extended release 24hr                

    completed             

                                              for 30 day(s)                

 

                Effexor XR                   634861                   RxNorm    

               

2019                   oral                  

                          150 mg  capsule,extended release 24hr                 

   completed              

                                              for 30 day(s)                

 

                trazodone                   991291                   RxNorm     

              

2020                   oral                  

                    50 mg  tablet                    completed                  

       for 30 day(s)

               

 

                venlafaxine                   420544                   RxNorm   

                

2020                   oral                  

                          37.5 mg  capsule,extended release 24hr                

    completed             

                                              for 30 day(s)                

 

                    Wellbutrin XL                   [id number]                   RxN

orm                 

                    2020                   or

al                 

                          300 mg  tablet extended release 24 hr                 

   active                

                                              for 30 day(s)                

 

                Effexor XR                   535863                   RxNorm    

               

2018                   oral                  

                          75 mg  capsule,extended release 24hr                  

  completed               

                                                             

 

                buspirone                   [id number]                   RxNorm     

              

2019                   oral                  

                    15 mg  tablet                    completed                  

       for 90 day(s)

               

 

                venlafaxine                   948590                   RxNorm   

                

2020                   oral                  

                          37.5 mg  capsule,extended release 24hr                

    completed             

                                              for 30 day(s)                

 

                Effexor XR                   174581                   RxNorm    

               

2019                   oral                  

                          37.5 mg  capsule,extended release 24hr                

    completed             

                                              for 30 day(s)                

 

                venlafaxine                   864084                   RxNorm   

                

2020                   oral                  

                          150 mg  capsule,extended release 24hr                 

   completed              

                                              for 30 day(s)                

 

                    Wellbutrin XL                   [id number]                   RxN

orm                 

                    2019                   or

al                 

                          300 mg  tablet extended release 24 hr                 

   completed             

                                              for 30 day(s)                

 

                    hydroxyzine HCl                   270042                   R

xNorm               

                    2019                   or

al               

                    25 mg  tablet                    completed                  

       for 30 

day(s)                

 

                venlafaxine                   962548                   RxNorm   

                

2019                   oral                  

                          37.5 mg  capsule,extended release 24hr                

    completed             

                                              for 30 day(s)                

 

                Effexor XR                   987022                   RxNorm    

               

2021                   oral                  

                          150 mg  capsule,extended release 24hr                 

   completed              

                                              for 30 day(s)                

 

                Effexor XR                   [id number]                   RxNorm    

               

2018                   oral                  

                          37.5 mg  capsule,extended release 24hr                

    completed             

                                                             

 

                venlafaxine                   659798                   RxNorm   

                

2020                   oral                  

                          37.5 mg  capsule,extended release 24hr                

    completed             

                                              for 30 day(s)                

 

                Effexor XR                   017884                   RxNorm    

               

2020-10-27                   2020                   oral                  

                          37.5 mg  capsule,extended release 24hr                

    completed             

                                              for 30 day(s)                

 

                Effexor XR                   112450                   RxNorm    

               

2020                   oral                  

                          37.5 mg  capsule,extended release 24hr                

    completed             

                                              for 30 day(s)                

 

                venlafaxine                   895693                   RxNorm   

                

2020                   oral                  

                          37.5 mg  capsule,extended release 24hr                

    completed             

                                              for 30 day(s)                

 

                Effexor XR                   637104                   RxNorm    

               

2018                   oral                  

                          75 mg  capsule,extended release 24hr                  

  completed               

                                              for 30 day(s)                

 

                Remeron                   687583                   RxNorm       

            

2020                   oral                  

                    15 mg  tablet                    completed                  

       for 30 day(s)

               

 

                Effexor XR                   672277                   RxNorm    

               

2018                   oral                  

                          75 mg  capsule,extended release 24hr                  

  completed               

                                                             

 

                Remeron                   302396                   RxNorm       

            

2021                   oral                  

                    15 mg  tablet                    active                     

    for 30 day(s)   

            

 

                Effexor XR                   367650                   RxNorm    

               

2021                   oral                  

                          150 mg  capsule,extended release 24hr                 

   active                 

                                              for 30 day(s)                

 

                Effexor XR                   668671                   RxNorm    

               

2018                   oral                  

                          75 mg  capsule,extended release 24hr                  

  completed               

                                                             

 

                venlafaxine                   116791                   RxNorm   

                

2019-10-29                   2019                   oral                  

                          37.5 mg  capsule,extended release 24hr                

    completed             

                                              for 30 day(s)                

 

                Effexor XR                   361919                   RxNorm    

               

2021                   oral                  

                          75 mg  capsule,extended release 24hr                  

  active                  

                                              for 30 day(s)                

 

                Effexor XR                   505598                   RxNorm    

               

2019                   oral                  

                          150 mg  capsule,extended release 24hr                 

   completed              

                                              for 30 day(s)                

 

                Effexor XR                   139070                   RxNorm    

               

2019                   oral                  

                          37.5 mg  capsule,extended release 24hr                

    completed             

                                              for 30 day(s)                

 

                    hydroxyzine HCl                   290260                   R

xNorm               

                    2020                   or

al               

                    25 mg  tablet                    completed                  

       for 30 

day(s)                

 

                venlafaxine                   570458                   RxNorm   

                

2020                   oral                  

                          37.5 mg  capsule,extended release 24hr                

    completed             

                                              for 30 day(s)                

 

                Effexor XR                   752343                   RxNorm    

               

2020                   oral                  

                          150 mg  capsule,extended release 24hr                 

   completed              

                                              for 30 day(s)                

 

                Effexor XR                   293452                   RxNorm    

               

2018                   oral                  

                          37.5 mg  capsule,extended release 24hr                

    completed             

                                                             

 

                    bupropion HCl                   [id number]                   RxN

orm                 

                    2019                   or

al                 

                          300 mg  tablet extended release 24 hr                 

   completed             

                                              for 30 day(s)                

 

                venlafaxine                   241668                   RxNorm   

                

2020                   oral                  

                          150 mg  capsule,extended release 24hr                 

   completed              

                                              for 30 day(s)                

 

                    bupropion HCl                   [id number]                   RxN

orm                 

                    2020                   or

al                 

                          300 mg  tablet extended release 24 hr                 

   completed             

                                              for 30 day(s)                

 

                trazodone                   024932                   RxNorm     

              

2020                   2020-02-15                   oral                  

                    50 mg  tablet                    completed                  

       for 30 day(s)

               

 

                Effexor XR                   505774                   RxNorm    

               

2020-10-27                   2020                   oral                  

                          150 mg  capsule,extended release 24hr                 

   completed              

                                              for 30 day(s)                

 

                    bupropion HCl                   999920                   RxN

orm                 

                    2020                   or

al                 

                          300 mg  tablet extended release 24 hr                 

   completed             

                                              for 30 day(s)                

 

                Effexor XR                   315073                   RxNorm    

               

2019                   2019-10-24                   oral                  

                          37.5 mg  capsule,extended release 24hr                

    completed             

                                              for 30 day(s)                

 

                Effexor XR                   658455                   RxNorm    

               

2021-02-15                   2021                   oral                  

                          75 mg  capsule,extended release 24hr                  

  completed               

                                              for 30 day(s)                

 

                venlafaxine                   832374                   RxNorm   

                

2019-10-29                   2019                   oral                  

                          150 mg  capsule,extended release 24hr                 

   completed              

                                              for 30 day(s)                

 

                buspirone                   187709                   RxNorm     

              

2021                   oral                  

                    10 mg  tablet                    active                     

    for 30 day(s)   

            

 

                venlafaxine                   174028                   RxNorm   

                

2020                   oral                  

                          150 mg  capsule,extended release 24hr                 

   completed              

                                              for 30 day(s)                

 

                Effexor XR                   420592                   RxNorm    

               

2019                   2019-10-24                   oral                  

                          150 mg  capsule,extended release 24hr                 

   completed              

                                              for 30 day(s)                

 

                trazodone                   439510                   RxNorm     

              

2020                   oral                  

                    50 mg  tablet                    completed                  

       for 30 day(s)

               

 

                Effexor XR                   627527                   RxNorm    

               

2021                   oral                  

                          75 mg  capsule,extended release 24hr                  

  completed               

                                              for 30 day(s)                

 

                trazodone                   642454                   RxNorm     

              

2020                   oral                  

                    50 mg  tablet                    completed                  

       for 30 day(s)

               

 

                venlafaxine                   523303                   RxNorm   

                

2020                   oral                  

                          150 mg  capsule,extended release 24hr                 

   completed              

                                              for 30 day(s)                

 

                venlafaxine                   859745                   RxNorm   

                

2020                   2020-10-18                   oral                  

                          150 mg  capsule,extended release 24hr                 

   completed              

                                              for 30 day(s)                

 

                Effexor XR                   441093                   RxNorm    

               

2018                   oral                  

                          37.5 mg  capsule,extended release 24hr                

    completed             

                                                             

 

                Effexor XR                   768358                   RxNorm    

               

2020                   oral                  

                          37.5 mg  capsule,extended release 24hr                

    completed             

                                              for 30 day(s)                

 

                trazodone                   887795                   RxNorm     

              

2020                   oral                  

                    50 mg  tablet                    completed                  

       for 30 day(s)

               

 

                Effexor XR                   403629                   RxNorm    

               

2019                   oral                  

                          150 mg  capsule,extended release 24hr                 

   completed              

                                              for 30 day(s)                

 

                Effexor XR                   248081                   RxNorm    

               

2019                   oral                  

                          150 mg  capsule,extended release 24hr                 

   completed              

                                              for 30 day(s)                



                                                                                
                                                                                
                                                                                
                                                                                
                                                                                
                                                                                
                                                                                
                                                                                
                                                                                
                                                                                
    



Problems

                      



                    Problem Name                   Code                   CodeSy

stem                

                    Alternate Code                   Alternate CodeSystem       

            Start 

Date                   End Date                   Status                   

Narrative                

 

                     Generalized anxiety disorder                   [id number]    

               

SNOMED-CT                                                           2021  

  

                                        Active                                  

 

 

                           Recurrent depressive disorder, current episode modera

te                   

[id number]                   SNOMED-CT                                           

 

                    2017                                       Active     

        

                                                        



                                                                                
                                           



Relevant diagnostic tests/laboratory data Narrative

          No Information                                                        
                 



Procedures

                      



                    Procedure Name                   Code                   Code

System              

                    Target Site                   Date of Procedure             

      Status    

                          Service Delivery Location                   Device Cod

e           

                          Device Name                   Device UID              

  

 

                          Psychotherapy, 45 minutes with patient                

   [id number]               

                    SNOMED-CT                    ()                   2017

                 

                          completed                   [address], [id number]  [id number]                                            

       

                                                        

 

                          Psychotherapy, 45 minutes with patient                

   [id number]               

                    SNOMED-CT                    ()                   2017

                 

                          completed                   [address], [id number]  [phone]                                            

       

                                                        

 

                          Psychotherapy, 45 minutes with patient                

   [id number]               

                    SNOMED-CT                    ()                   2017

                 

                          completed                   [address], [id number]  [id number]                                            

       

                                                        

 

                          Psychotherapy, 45 minutes with patient                

   [id number]               

                    SNOMED-CT                    ()                   2017-10-16

                 

                          completed                   [facility], [id number]  [id number]                                            

       

                                                        

 

                          Psychotherapy, 45 minutes with patient                

   [id number]               

                    SNOMED-CT                    ()                   2017-10-31

                 

                          completed                   [address], [id number]  [phone]                                            

       

                                                        

 

                          Psychotherapy, 45 minutes with patient                

   [id number]               

                    SNOMED-CT                    ()                   2017

                 

                          completed                   [address], [id number]  [id number]                                            

       

                                                        

 

                          Psychotherapy, 45 minutes with patient                

   [id number]               

                    SNOMED-CT                    ()                   2021

                 

                          completed                   [address], [id number]  [id number]                                            

       

                                                        

 

                          Psychotherapy, 45 minutes with patient                

   [id number]               

                    SNOMED-CT                    ()                   2021

                 

                          completed                   [address], [id number]  [id number]                                            

       

                                                        

 

                          Psychotherapy, 45 minutes with patient                

   [id number]               

                    SNOMED-CT                    ()                   2021

                 

                          completed                   [address], [id number]  [id number]                                            

       

                                                        

 

                          Psychotherapy, 45 minutes with patient                

   [id number]               

                    SNOMED-CT                    ()                   2021

                 

                          completed                   [address], [id number]  [id number]                                            

       

                                                        

 

                          Psychotherapy, 45 minutes with patient                

   [id number]               

                    SNOMED-CT                    ()                   2021

                 

                          completed                   [address], [id number]  [id number]                                            

       

                                                        

 

                          Psychotherapy, 45 minutes with patient                

   [id number]               

                    SNOMED-CT                    ()                   2021

                 

                          completed                   [address], [id number]  [id number]                                            

       

                                                        

 

                          Psychotherapy, 45 minutes with patient                

   [id number]               

                    SNOMED-CT                    ()                   2021

                 

                          completed                   [address], [id number]  [id number]                                            

       

                                                        

 

                          Psychotherapy, 45 minutes with patient                

   [id number]               

                    SNOMED-CT                    ()                   2021

                 

                          completed                   [address], 706704917  [id number]                                            

       

                                                        

 

                          Psychotherapy, 45 minutes with patient                

   [id number]               

                    SNOMED-CT                    ()                   2021

                 

                          completed                   [address], [id number]  [id number]                                            

       

                                                        

 

                          Psychotherapy, 45 minutes with patient                

   [id number]               

                    SNOMED-CT                    ()                   2021

                 

                          completed                   [address], 257387098  [id number]                                            

       

                                                        

 

                          Psychotherapy, 45 minutes with patient                

   [id number]               

                    SNOMED-CT                    ()                   2021

                 

                          completed                   [address], 176035204  [id number]                                            

       

                                                        

 

                          Psychotherapy, 45 minutes with patient                

   [id number]               

                    SNOMED-CT                    ()                   2021

                 

                          completed                   [address], [id number]  [id number]                                            

       

                                                        

 

                          Psychotherapy, 45 minutes with patient                

   [id number]               

                    SNOMED-CT                    ()                   2021

                 

                          completed                   [address], 375558370  [id number]                                            

       

                                                        

 

                          Psychotherapy, 45 minutes with patient                

   [id number]               

                    SNOMED-CT                    ()                   2021

                 

                          completed                   [address], [id number]  [id number]                                            

       

                                                        

 

                          Psychotherapy, 45 minutes with patient                

   [id number]               

                    SNOMED-CT                    ()                   2020-10-08

                 

                          completed                   [address], 433860516  [id number]                                            

       

                                                        

 

                          Psychotherapy, 45 minutes with patient                

   [id number]               

                    SNOMED-CT                    ()                   2020-10-29

                 

                          completed                   [address], 670473630  [id number]                                            

       

                                                        

 

                          Psychotherapy, 45 minutes with patient                

   [id number]               

                    SNOMED-CT                    ()                   2020

                 

                          completed                   [address], 554123906  [id number]                                            

       

                                                        

 

                          Psychotherapy, 45 minutes with patient                

   [id number]               

                    SNOMED-CT                    ()                   2020

                 

                          completed                   [address], 425705883  [id number]                                            

       

                                                        

 

                          Psychotherapy, 45 minutes with patient                

   [id number]               

                    SNOMED-CT                    ()                   2020

                 

                          completed                   [address], [id number]  [id number]                                            

       

                                                        

 

                          Psychotherapy, 45 minutes with patient                

   [id number]               

                    SNOMED-CT                    ()                   2020

                 

                          completed                   [address], [id number]  [id number]                                            

       

                                                        

 

                          Psychotherapy, 45 minutes with patient                

   [id number]               

                    SNOMED-CT                    ()                   2020

                 

                          completed                   [address], 442161122  [id number]                                            

       

                                                        

 

                          Psychotherapy, 45 minutes with patient                

   [id number]               

                    SNOMED-CT                    ()                   2020

                 

                          completed                   [address], [id number]  [id number]                                            

       

                                                        

 

                          Psychotherapy, 45 minutes with patient                

   [id number]               

                    SNOMED-CT                    ()                   2020

                 

                          completed                   [address], 604193449  [id number]                                            

       

                                                        

 

                          Psychotherapy, 45 minutes with patient                

   [id number]               

                    SNOMED-CT                    ()                   2020

                 

                          completed                   [address], 474844392  [id number]                                            

       

                                                        

 

                          Psychotherapy, 45 minutes with patient                

   [id number]               

                    SNOMED-CT                    ()                   2020

                 

                          completed                   [address], 466836337  [id number]                                            

       

                                                        

 

                          Psychotherapy, 45 minutes with patient                

   [id number]               

                    SNOMED-CT                    ()                   2020

                 

                          completed                   [address], 975820204  [id number]                                            

       

                                                        

 

                          Psychotherapy, 45 minutes with patient                

   [id number]               

                    SNOMED-CT                    ()                   2019

                 

                          completed                   [address], [id number]  [id number]                                            

       

                                                        

 

                          Psychotherapy, 45 minutes with patient                

   [id number]               

                    SNOMED-CT                    ()                   2019-10-22

                 

                          completed                   [address], [id number]  [id number]                                            

       

                                                        

 

                          Psychotherapy, 45 minutes with patient                

   [id number]               

                    SNOMED-CT                    ()                   2019

                 

                          completed                   [address], [id number]  [id number]                                            

       

                                                        

 

                          Psychotherapy, 45 minutes with patient                

   [id number]               

                    SNOMED-CT                    ()                   2020

                 

                          completed                   [address], [id number]  [id number]                                            

       

                                                        

 

                          Psychotherapy, 45 minutes with patient                

   [id number]               

                    SNOMED-CT                    ()                   2020

                 

                          completed                   [address], [id number]  [phone]                                            

       

                                                        

 

                          Psychotherapy, 45 minutes with patient                

   [id number]               

                    SNOMED-CT                    ()                   2020

                 

                          completed                   [address], [id number]  [id number]                                            

       

                                                        

 

                          Psychotherapy, 45 minutes with patient                

   [id number]               

                    SNOMED-CT                    ()                   2019

                 

                          completed                   [address], [id number] 

[id number]                                                                      

  

  

 

                          Psychotherapy, 45 minutes with patient                

   [id number]               

                    SNOMED-CT                    ()                   2019-06-10

                 

                          completed                   [address], [id number]  [id number]                                            

       

                                                        

 

                          Psychotherapy, 45 minutes with patient                

   [id number]               

                    SNOMED-CT                    ()                   2019

                 

                          completed                   [address], 717701084  [id number]                                            

       

                                                        

 

                          Psychotherapy, 45 minutes with patient                

   [id number]               

                    SNOMED-CT                    ()                   2019

                 

                          completed                   [address], [id number]  [id number]                                            

       

                                                        

 

                          Psychotherapy, 45 minutes with patient                

   [id number]               

                    SNOMED-CT                    ()                   2019

                 

                          completed                   [address], [id number]  [id number]                                            

       

                                                        

 

                          Psychotherapy, 45 minutes with patient                

   [id number]               

                    SNOMED-CT                    ()                   2019

                 

                          completed                   [address], [id number]  [id number]                                            

       

                                                        

 

                          Psychotherapy, 45 minutes with patient                

   [id number]               

                    SNOMED-CT                    ()                   2019

                 

                          completed                   [address], [id number]  [id number]                                            

       

                                                        

 

                          Psychotherapy, 45 minutes with patient                

   [id number]               

                    SNOMED-CT                    ()                   2019

                 

                          completed                   [address], [id number]  [id number]                                            

       

                                                        

 

                          Psychotherapy, 45 minutes with patient                

   [id number]               

                    SNOMED-CT                    ()                   2019

                 

                          completed                   [address], [id number]  [id number]                                            

       

                                                        

 

                          Psychotherapy, 45 minutes with patient                

   [id number]               

                    SNOMED-CT                    ()                   2019-03-15

                 

                          completed                   [address], [id number]  [id number]                                            

       

                                                        

 

                          Psychotherapy, 45 minutes with patient                

   [id number]               

                    SNOMED-CT                    ()                   2019-04-15

                 

                          completed                   [address], [id number]  [id number]                                            

       

                                                        

 

                          Psychotherapy, 45 minutes with patient                

   [id number]               

                    SNOMED-CT                    ()                   2019

                 

                          completed                   [address], [id number]  [id number]                                            

       

                                                        

 

                          Psychotherapy, 45 minutes with patient                

   [id number]               

                    SNOMED-CT                    ()                   2018

                 

                          completed                   [address], [id number]  [id number]                                            

       

                                                        

 

                          Psychotherapy, 45 minutes with patient                

   [id number]               

                    SNOMED-CT                    ()                   2018

                 

                          completed                   [address], [id number]  [id number]                                            

       

                                                        

 

                          Psychotherapy, 45 minutes with patient                

   [id number]               

                    SNOMED-CT                    ()                   2018-10-16

                 

                          completed                   [address], [id number]  [id number]                                            

       

                                                        

 

                          Psychotherapy, 45 minutes with patient                

   [id number]               

                    SNOMED-CT                    ()                   2018

                 

                          completed                   [address], [id number]  [id number]                                            

       

                                                        

 

                          Psychotherapy, 45 minutes with patient                

   [id number]               

                    SNOMED-CT                    ()                   2018-12-10

                 

                          completed                   [address], [id number]  [id number]                                            

       

                                                        

 

                          Psychotherapy, 45 minutes with patient                

   [id number]               

                    SNOMED-CT                    ()                   2018

                 

                          completed                   [address], [id number]  [id number]                                            

       

                                                        

 

                          Psychotherapy, 45 minutes with patient                

   [id number]               

                    SNOMED-CT                    ()                   2018

                 

                          completed                   [address], [id number]  [id number]                                            

       

                                                        

 

                          Psychotherapy, 45 minutes with patient                

   [id number]               

                    SNOMED-CT                    ()                   2018

                 

                          completed                   [address], [id number]  [id number]                                            

       

                                                        

 

                          Psychotherapy, 45 minutes with patient                

   [id number]               

                    SNOMED-CT                    ()                   2018

                 

                          completed                   [address], [id number]  [id number]                                            

       

                                                        

 

                          Psychotherapy, 45 minutes with patient                

   [id number]               

                    SNOMED-CT                    ()                   2018

                 

                          completed                   [address], [id number]  [id number]                                            

       

                                                        

 

                          Psychotherapy, 45 minutes with patient                

   [id number]               

                    SNOMED-CT                    ()                   2018

                 

                          completed                   [address], [id number]  [id number]                                            

       

                                                        

 

                          Psychotherapy, 45 minutes with patient                

   [id number]               

                    SNOMED-CT                    ()                   2018

                 

                          completed                   [address], [id number]  [id number]                                            

       

                                                        

 

                          Psychotherapy, 45 minutes with patient                

   [id number]               

                    SNOMED-CT                    ()                   2017

                 

                          completed                   [address], [id number]  [id number]                                            

       

                                                        

 

                          Psychotherapy, 45 minutes with patient                

   [id number]               

                    SNOMED-CT                    ()                   2017

                 

                          completed                   [address], [id number]  [id number]                                            

       

                                                        

 

                          Psychotherapy, 45 minutes with patient                

   [id number]               

                    SNOMED-CT                    ()                   2018

                 

                          completed                   [address], [id number]  [id number]                                            

       

                                                        

 

                          Psychotherapy, 45 minutes with patient                

   [id number]               

                    SNOMED-CT                    ()                   2018

                 

                          completed                   [address], 755563771  [id number]                                            

       

                                                        

 

                          Psychotherapy, 45 minutes with patient                

   [id number]               

                    SNOMED-CT                    ()                   2018

                 

                          completed                   [address], [id number]  [id number]                                            

       

                                                        

 

                          Psychotherapy, 45 minutes with patient                

   [id number]               

                    SNOMED-CT                    ()                   2018

                 

                          completed                   [address], [id number]  [id number]                                            

       

                                                        

 

                    Initial Psychiatric Evaluation                   [id number]  

                 

SNOMED-CT                    ()                   2017                   

completed                               [address], [id number]  [id number]                                            

       

                                                        

 

                          Health Monitoring / Risk Reduction Counseling - [facility]

ed                   

[id number]                   SNOMED-CT                    ()                   

2017                   completed                   [address], [id number]  [id number]                   

                                                                    

 

                    Est. Patient - E&M Intermediate                   [id number] 

                  

SNOMED-CT                    ()                   2018                   

completed                               [address], [id number]  [id number]                                            

       

                                                        

 

                    Est. Patient - E&M Intermediate                   [id number] 

                  

SNOMED-CT                    ()                   2018                   

completed                               [address], [id number]  [id number]                                            

       

                                                        

 

                    Est. Patient - E&M Intermediate                   [id number] 

                  

SNOMED-CT                    ()                   2019-01-15                   

completed                               [address], [id number]  [id number]                                            

       

                                                        

 

                    Est. Patient - E&M Intermediate                   [id number] 

                  

SNOMED-CT                    ()                   2019                   

completed                               [address], [id number]  [id number]                                            

       

                                                        

 

                    Est. Patient - E&M Intermediate                   [id number] 

                  

SNOMED-CT                    ()                   2019                   

completed                               [address], [id number]  [id number]                                            

       

                                                        

 

                    Est. Patient - E&M Intermediate                   [id number] 

                  

SNOMED-CT                    ()                   2019                   

completed                               [address], [id number]  [id number]                                            

       

                                                        

 

                    Est. Patient - E&M Intermediate                   [id number] 

                  

SNOMED-CT                    ()                   2020                   

completed                               [address], [id number]  [id number]                                            

       

                                                        

 

                    Est. Patient - E&M Intermediate                   [id number] 

                  

SNOMED-CT                    ()                   2020                   

completed                               [address], [id number]  [id number]                                            

       

                                                        

 

                    Est. Patient - E&M Intermediate                   [id number] 

                  

SNOMED-CT                    ()                   2020                   

completed                               [address], [id number]  [id number]                                            

       

                                                        

 

                    Est. Patient - E&M Intermediate                   [id number] 

                  

SNOMED-CT                    ()                   2019                   

completed                               [address], [id number]  [id number]                                            

       

                                                        

 

                    Est. Patient - E&M Intermediate                   [id number] 

                  

SNOMED-CT                    ()                   2019                   

completed                               [address], [id number]  [id number]                                            

       

                                                        

 

                    Est. Patient - E&M Intermediate                   [id number] 

                  

SNOMED-CT                    ()                   2019                   

completed                               [address], [id number]  [id number]                                            

       

                                                        

 

                    Est. Patient - E&M Intermediate                   [id number] 

                  

SNOMED-CT                    ()                   2019                   

completed                               [address], [id number]  [id number]                                            

       

                                                        

 

                    Est. Patient - E&M Intermediate                   [id number] 

                  

SNOMED-CT                    ()                   2019-10-08                   

completed                               [address], [id number]  [id number]                                            

       

                                                        

 

                    Est. Patient - E&M Intermediate                   [id number] 

                  

SNOMED-CT                    ()                   2019                   

completed                               [address], [id number]  [id number]                                            

       

                                                        

 

                    Est. Patient - E&M Brief                   [id number]        

           SNOMED-CT

                     ()                   2018                   completed

  

                                        [address], 

[id number]  [id number]                                                           

  

             

 

                    Est. Patient - E&M Brief                   [id number]        

           SNOMED-CT

                     ()                   2019                   completed

  

                                        [address], 

[id number]  [id number]                                                           

  

             

 

                    Est. Patient - E&M Brief                   [id number]        

           SNOMED-CT

                     ()                   2020                   completed

  

                                        [address], 

[id number]  [id number]                                                           

  

             

 

                    Est. Patient - E&M Brief                   [id number]        

           SNOMED-CT

                     ()                   2020                   completed

  

                                        [address], 

[id number]  [id number]                                                           

  

             

 

                    Est. Patient - E&M Brief                   [id number]        

           SNOMED-CT

                     ()                   2020                   completed

  

                                        [address], 

[id number]  [id number]                                                           

  

             

 

                    Est. Patient - E&M Brief                   [id number]        

           SNOMED-CT

                     ()                   2020                   completed

  

                                        [address], 

[id number]  [id number]                                                           

  

             

 

                    Est. Patient - E&M Brief                   [id number]        

           SNOMED-CT

                     ()                   2020                   completed

  

                                        [address], [place], 

[address]  [id number]                                                           

  

             

 

                    Est. Patient - E&M Brief                   [id number]        

           SNOMED-CT

                     ()                   2021                   completed

  

                                        [address], 

[id number]  [id number]                                                           

  

             

 

                    Est. Patient - E&M Brief                   [id number]        

           SNOMED-CT

                     ()                   2021-02-15                   completed

  

                                        [address], 

[id number]  [id number]                                                           

  

             

 

                    Est. Patient - E&M Expanded                   [id number]     

              

SNOMED-CT                    ()                   2018                   

completed                               [address], [id number]  [id number]                                            

       

                                                        

 

                    Est. Patient - E&M Expanded                   [id number]     

              

SNOMED-CT                    ()                   2018                   

completed                               [address], [id number]  [id number]                                            

       

                                                        

 

                    Est. Patient - E&M Expanded                   [id number]     

              

SNOMED-CT                    ()                   2018                   

completed                               [address], [id number]  [id number]                                            

       

                                                        

 

                    Est. Patient - E&M Expanded                   [id number]     

              

SNOMED-CT                    ()                   2018                   

completed                               [address], [id number]  [id number]                                            

       

                                                        

 

                    Est. Patient - E&M Expanded                   [id number]     

              

SNOMED-CT                    ()                   2018                   

completed                               [address], [id number]  [id number]                                            

       

                                                        

 

                    Est. Patient - E&M Expanded                   [id number]     

              

SNOMED-CT                    ()                   2018                   

completed                               [address], [id number]  [id number]                                            

       

                                                        

 

                    Est. Patient - E&M Expanded                   [id number]     

              

SNOMED-CT                    ()                   2018                   

completed                               [address], [id number]  [id number]                                            

       

                                                        

 

                    Est. Patient - E&M Expanded                   [id number]     

              

SNOMED-CT                    ()                   2021                   

completed                               [address], [id number]  [id number]                                            

       

                                                        

 

                    Est. Patient - E&M Expanded                   [id number]     

              

SNOMED-CT                    ()                   2021                   

completed                               [address], [id number]  [id number]                                            

       

                                                        

 

                    Est. Patient - E&M Expanded                   [id number]     

              

SNOMED-CT                    ()                   2021                   

completed                               [address], [id number]  [id number]                                            

       

                                                        

 

                    Est. Patient - E&M Expanded                   [id number]     

              

SNOMED-CT                    ()                   2021                   

completed                               [address], [id number]  [id number]                                            

       

                                                        

 

                    Individual Psychotherapy                   [id number]         

          SNOMED-CT 

                     ()                   2018                   completed

   

                                        [address], 

[id number]  [id number]                                                           

  

             

 

                    Individual Psychotherapy                   [id number]         

          SNOMED-CT 

                     ()                   2020                   completed

   

                                        [address], 

[id number]  [id number]                                                           

  

             

 

                    Individual Psychotherapy                   [id number]         

          SNOMED-CT 

                     ()                   2021                   completed

   

                                        [address], 

[id number]  [id number]                                                           

  

             

 

                    Individual Psychotherapy                   [id number]         

          SNOMED-CT 

                     ()                   2021-02-10                   completed

   

                                        [address], 

468172527  [id number]                                                           

  

             

 

                    Individual Psychotherapy                   [id number]         

          SNOMED-CT 

                     ()                   2017                   completed

   

                                        [address], 

[id number]  [id number]                                                           

  

             

 

                    Individual Psychotherapy                   [id number]         

          SNOMED-CT 

                     ()                   2017                   completed

   

                                        [address], [place], 

[id number]  [id number]                                                           

  

             

 

                    Individual Psychotherapy                   [id number]         

          SNOMED-CT 

                     ()                   2017                   completed

   

                                        [address], 

[id number]  [id number]                                                           

  

             

 

                    Individual Psychotherapy                   [id number]         

          SNOMED-CT 

                     ()                   2017-10-16                   completed

   

                                        [address], 

[id number]  [id number]                                                           

  

             

 

                    Individual Psychotherapy                   [id number]         

          SNOMED-CT 

                     ()                   2017-10-31                   completed

   

                                        [address], 

[id number]  [id number]                                                           

  

             

 

                    Individual Psychotherapy                   [id number]         

          SNOMED-CT 

                     ()                   2017                   completed

   

                                        [address], 

[id number]  [id number]                                                           

  

             

 

                    Individual Psychotherapy                   [id number]         

          SNOMED-CT 

                     ()                   2021                   completed

   

                                        [address], 

[id number]  [id number]                                                           

  

             

 

                    Individual Psychotherapy                   [id number]         

          SNOMED-CT 

                     ()                   2021                   completed

   

                                        [address], 

[id number]  [id number]                                                           

  

             

 

                    Individual Psychotherapy                   [id number]         

          SNOMED-CT 

                     ()                   2021                   completed

   

                                        [address], 

[id number]  [id number]                                                           

  

             

 

                    Individual Psychotherapy                   [id number]         

          SNOMED-CT 

                     ()                   2021                   completed

   

                                        [address], 

[id number]  [id number]                                                           

  

             

 

                    Individual Psychotherapy                   [id number]         

          SNOMED-CT 

                     ()                   2021                   completed

   

                                        [address], 

[id number]  [id number]                                                           

  

             

 

                    Individual Psychotherapy                   [id number]         

          SNOMED-CT 

                     ()                   2021                   completed

   

                                        [address], 

[id number]  [id number]                                                           

  

             

 

                    Individual Psychotherapy                   [id number]         

          SNOMED-CT 

                     ()                   2021                   completed

   

                                        [address], 

[id number]  [id number]                                                           

  

             

 

                    Individual Psychotherapy                   [id number]         

          SNOMED-CT 

                     ()                   2021                   completed

   

                                        [address], 

[id number]  [id number]                                                           

  

             

 

                    Individual Psychotherapy                   [id number]         

          SNOMED-CT 

                     ()                   2021                   completed

   

                                        [address], 

[id number]  [id number]                                                           

  

             

 

                    Individual Psychotherapy                   [id number]         

          SNOMED-CT 

                     ()                   2021                   completed

   

                                        [address], 

[id number]  [id number]                                                           

  

             

 

                    Individual Psychotherapy                   [id number]         

          SNOMED-CT 

                     ()                   2021                   completed

   

                                        [address], 

[id number]  [id number]                                                           

  

             

 

                    Individual Psychotherapy                   [id number]         

          SNOMED-CT 

                     ()                   2021                   completed

   

                                        [address], 

[id number]  [id number]                                                           

  

             

 

                    Individual Psychotherapy                   [id number]         

          SNOMED-CT 

                     ()                   2021                   completed

   

                                        [address], 

[id number]  [id number]                                                           

  

             

 

                    Individual Psychotherapy                   [id number]         

          SNOMED-CT 

                     ()                   2021                   completed

   

                                        [address], 

[id number]  [id number]                                                           

  

             

 

                    Individual Psychotherapy                   [id number]         

          SNOMED-CT 

                     ()                   2020-10-08                   completed

   

                                        [address], 

[id number]  [id number]                                                           

  

             

 

                    Individual Psychotherapy                   [id number]         

          SNOMED-CT 

                     ()                   2020-10-29                   completed

   

                                        [address], 

[id number]  [id number]                                                           

  

             

 

                    Individual Psychotherapy                   [id number]         

          SNOMED-CT 

                     ()                   2020                   completed

   

                                        [address], 

[id number]  [id number]                                                           

  

             

 

                    Individual Psychotherapy                   [id number]         

          SNOMED-CT 

                     ()                   2020                   completed

   

                                        [address], 

[id number]  [id number]                                                           

  

             

 

                    Individual Psychotherapy                   [id number]         

          SNOMED-CT 

                     ()                   2020                   completed

   

                                        [address], 

[id number]  [id number]                                                           

  

             

 

                    Individual Psychotherapy                   [id number]         

          SNOMED-CT 

                     ()                   2020                   completed

   

                                        [address], 

[id number]  [id number]                                                           

  

             

 

                    Individual Psychotherapy                   [id number]         

          SNOMED-CT 

                     ()                   2020                   completed

   

                                        [address], 

[id number]  [id number]                                                           

  

             

 

                    Individual Psychotherapy                   [id number]         

          SNOMED-CT 

                     ()                   2020                   completed

   

                                        [address], 

[id number]  [id number]                                                           

  

             

 

                    Individual Psychotherapy                   [id number]         

          SNOMED-CT 

                     ()                   2020                   completed

   

                                        [address], 

[id number]  [id number]                                                           

  

             

 

                    Individual Psychotherapy                   [id number]         

          SNOMED-CT 

                     ()                   2020                   completed

   

                                        [address], 

[id number]  [id number]                                                           

  

             

 

                    Individual Psychotherapy                   [id number]         

          SNOMED-CT 

                     ()                   2020                   completed

   

                                        [address], 

[id number]  [id number]                                                           

  

             

 

                    Individual Psychotherapy                   [id number]         

          SNOMED-CT 

                     ()                   2020                   completed

   

                                        [address], 

[id number]  [id number]                                                           

  

             

 

                    Individual Psychotherapy                   [id number]         

          SNOMED-CT 

                     ()                   2019                   completed

   

                                        [address], [place], 

[id number]  [id number]                                                           

  

             

 

                    Individual Psychotherapy                   [id number]         

          SNOMED-CT 

                     ()                   2019-10-22                   completed

   

                                        [address], 

[id number]  [id number]                                                           

  

             

 

                    Individual Psychotherapy                   [id number]         

          SNOMED-CT 

                     ()                   2019                   completed

   

                                        [address], 

[id number]  [id number]                                                           

  

             

 

                    Individual Psychotherapy                   [id number]         

          SNOMED-CT 

                     ()                   2020                   completed

   

                                        [address], 

[id number]  [id number]                                                           

  

             

 

                    Individual Psychotherapy                   [id number]         

          SNOMED-CT 

                     ()                   2020                   completed

   

                                        [address], 

[id number]  [id number]                                                           

  

             

 

                    Individual Psychotherapy                   [id number]         

          SNOMED-CT 

                     ()                   2020                   completed

   

                                        [address], 

[id number]  [id number]                                                           

  

             

 

                    Individual Psychotherapy                   [id number]         

          SNOMED-CT 

                     ()                   2019                   completed

   

                                        [address],

 [id number]  [id number]   

                                                                            

 

                    Individual Psychotherapy                   [id number]         

          SNOMED-CT 

                     ()                   2019-06-10                   completed

   

                                        [address], 

[id number]  [id number]                                                           

  

             

 

                    Individual Psychotherapy                   [id number]         

          SNOMED-CT 

                     ()                   2019                   completed

   

                                        [address], 

[id number]  [id number]                                                           

  

             

 

                    Individual Psychotherapy                   [id number]         

          SNOMED-CT 

                     ()                   2019                   completed

   

                                        [address], 

[id number]  [id number]                                                           

  

             

 

                    Individual Psychotherapy                   [id number]         

          SNOMED-CT 

                     ()                   2019                   completed

   

                                        [address], 

[id number]  [id number]                                                           

  

             

 

                    Individual Psychotherapy                   [id number]         

          SNOMED-CT 

                     ()                   2019                   completed

   

                                        [address], 

[id number]  [id number]                                                           

  

             

 

                    Individual Psychotherapy                   [id number]         

          SNOMED-CT 

                     ()                   2019                   completed

   

                                        [address], 

[id number]  [id number]                                                           

  

             

 

                    Individual Psychotherapy                   [id number]         

          SNOMED-CT 

                     ()                   2019                   completed

   

                                        [address], 

[id number]  [id number]                                                           

  

             

 

                    Individual Psychotherapy                   [id number]         

          SNOMED-CT 

                     ()                   2019                   completed

   

                                        [address], 

[id number]  [id number]                                                           

  

             

 

                    Individual Psychotherapy                   [id number]         

          SNOMED-CT 

                     ()                   2019-03-15                   completed

   

                                        [address], 

[id number]  [id number]                                                           

  

             

 

                    Individual Psychotherapy                   [id number]         

          SNOMED-CT 

                     ()                   2019-04-15                   completed

   

                                        [address], 

[id number]  [id number]                                                           

  

             

 

                    Individual Psychotherapy                   [id number]         

          SNOMED-CT 

                     ()                   2019                   completed

   

                                        [address], 

[id number]  [id number]                                                           

  

             

 

                    Individual Psychotherapy                   [id number]         

          SNOMED-CT 

                     ()                   2018                   completed

   

                                        [address], 

[id number]  [id number]                                                           

  

             

 

                    Individual Psychotherapy                   [id number]         

          SNOMED-CT 

                     ()                   2018                   completed

   

                                        [address], 

[id number]  [id number]                                                           

  

             

 

                    Individual Psychotherapy                   [id number]         

          SNOMED-CT 

                     ()                   2018-10-16                   completed

   

                                        [address], 

[id number]  [id number]                                                           

  

             

 

                    Individual Psychotherapy                   [id number]         

          SNOMED-CT 

                     ()                   2018                   completed

   

                                        [address], [place], 

[id number]  [id number]                                                           

  

             

 

                    Individual Psychotherapy                   [id number]         

          SNOMED-CT 

                     ()                   2018-12-10                   completed

   

                                        [address], 

[id number]  [id number]                                                           

  

             

 

                    Individual Psychotherapy                   [id number]         

          SNOMED-CT 

                     ()                   2018                   completed

   

                                        [address], 

[id number]  [id number]                                                           

  

             

 

                    Individual Psychotherapy                   [id number]         

          SNOMED-CT 

                     ()                   2018                   completed

   

                                        [address], 

[id number]  [id number]                                                           

  

             

 

                    Individual Psychotherapy                   [id number]         

          SNOMED-CT 

                     ()                   2018                   completed

   

                                        [address], 

[id number]  [id number]                                                           

  

             

 

                    Individual Psychotherapy                   [id number]         

          SNOMED-CT 

                     ()                   2018                   completed

   

                                        [address], 

[id number]  [id number]                                                           

  

             

 

                    Individual Psychotherapy                   [id number]         

          SNOMED-CT 

                     ()                   2018                   completed

   

                                        [address], 

[id number]  [id number]                                                           

  

             

 

                    Individual Psychotherapy                   [id number]         

          SNOMED-CT 

                     ()                   2018                   completed

   

                                        [address], 

[id number]  [id number]                                                           

  

             

 

                    Individual Psychotherapy                   [id number]         

          SNOMED-CT 

                     ()                   2018                   completed

   

                                        [address], 

[id number]  [id number]                                                           

  

             

 

                    Individual Psychotherapy                   [id number]         

          SNOMED-CT 

                     ()                   2017                   completed

   

                                        [address], 

[id number]  [id number]                                                           

  

             

 

                    Individual Psychotherapy                   [id number]         

          SNOMED-CT 

                     ()                   2017                   completed

   

                                        [address], 

[id number]  [id number]                                                           

  

             

 

                    Individual Psychotherapy                   [id number]         

          SNOMED-CT 

                     ()                   2018                   completed

   

                                        [address], 

[id number]  [id number]                                                           

  

             

 

                    Individual Psychotherapy                   [id number]         

          SNOMED-CT 

                     ()                   2018                   completed

   

                                        [address], 

[id number]  [id number]                                                           

  

             

 

                    Individual Psychotherapy                   [id number]         

          SNOMED-CT 

                     ()                   2018                   completed

   

                                        [address], 

[id number]  [id number]                                                           

  

             

 

                    Individual Psychotherapy                   [id number]         

          SNOMED-CT 

                     ()                   2018                   completed

   

                                        [address], 

[id number]  [id number]                                                           

  

             

 

                          Psychiatric Diagnostic Evaluation without medical serv

ices                   

[id number]                   SNOMED-CT                    ()                   

2017                   completed                   [address], [id number]  [id number]                   

                                                                    

 

                                                      SNOMED-CT                 

   ()           

                    2021                   completed                   [address], [id number]  [phone]           
                                                                            

 

                                                      SNOMED-CT                 

   ()           

                    2017                   completed                   [address], [id number]  [phone]           
                                                                            

 

                                                      SNOMED-CT                 

   ()           

                    2017                   completed                   [address], [id number]  [id number]           
                                                                            

 

                                                      SNOMED-CT                 

   ()           

                    2017                   completed                   [address], [id number]  [id number]           
                                                                            

 

                                                      SNOMED-CT                 

   ()           

                    2017                   completed                   [address], [id number]  [id number]           
                                                                            

 

                                                      SNOMED-CT                 

   ()           

                    2017                   completed                   [address], [id number]  [id number]           
                                                                            

 

                                                      SNOMED-CT                 

   ()           

                    2017-10-16                   completed                   [facility]

C [address], [id number]  [phone]           
                                                                            

 

                                                      SNOMED-CT                 

   ()           

                    2017                   completed                   [facility]

C [address], [id number]  [id number]           
                                                                            

 

                                                      SNOMED-CT                 

   ()           

                    2017                   completed                   W

C [address], [id number]  [id number]           
                                                                            

 

                                                      SNOMED-CT                 

   ()           

                    2017                   completed                   W

C [address], [id number]  [phone]           
                                                                            

 

                                                      SNOMED-CT                 

   ()           

                    2017                   completed                   W

C [address], [id number]  [phone]           
                                                                            

 

                                                      SNOMED-CT                 

   ()           

                    2017-10-31                   completed                   W

C [address], [id number]  [phone]           
                                                                            

 

                                                      SNOMED-CT                 

   ()           

                    2021                   completed                   W

C [address], [id number]  [phone]           
                                                                            

 

                                                      SNOMED-CT                 

   ()           

                    2021                   completed                   W

C [address], [id number]  [phone]           
                                                                            

 

                                                      SNOMED-CT                 

   ()           

                    2021                   completed                   W

C [address], [id number]  [id number]           
                                                                            

 

                                                      SNOMED-CT                 

   ()           

                    2021                   completed                   W

C [address], [id number]  [phone]           
                                                                            

 

                                                      SNOMED-CT                 

   ()           

                    2021-02-10                   completed                   W

C [address], [id number]  [id number]           
                                                                            

 

                                                      SNOMED-CT                 

   ()           

                    2021                   completed                   [facility]

C [address], [id number]  [phone]           
                                                                            

 

                                                      SNOMED-CT                 

   ()           

                    2021                   completed                   [facility]

C [address], [id number]  [phone]           
                                                                            

 

                                                      SNOMED-CT                 

   ()           

                    2021                   completed                   [facility]

C [address], [id number]  [phone]           
                                                                            

 

                                                      SNOMED-CT                 

   ()           

                    2018                   completed                   [facility]

C [address], [id number]  [phone]           
                                                                            

 

                                                      SNOMED-CT                 

   ()           

                    2018                   completed                   [facility]

C [address], [id number]  [phone]           
                                                                            

 

                                                      SNOMED-CT                 

   ()           

                    2018                   completed                   [facility]

C [address], [id number]  [phone]           
                                                                            



                                                                                
                                                                                
                                                                                
                                                                                
                                                                                
                                                                                
                                                                                
                                                                                
                                                                                
                                                                                
                                                                                
                                                                                
                                                                                
                                                                                
                                                                                
                                                                                
                                                                                
                                                                                
                                                                                
                                                                                
                                                                                
                                                                                
                                                                                
                                                                                
                                                                                
                                                                     



Encounters/Encounter Diagnoses

                      



                    Encounter Name                   Encounter Code             

      Diagnosis Code

                          Diagnosis Name                   Diagnosis CodeSystem 

        

                          Date of Diagnosis                   Service Delivery L

ocation          

     

 

                          Telehealth Physchotherapy 30 Minutes with Patient     

              [address]       

                          [id number]                   Recurrent depressive disor

dennise, current 

episode moderate                   SNOMED-CT                   2021       

                                        [facility]  [address] [place] [address], 

[id number]                  



                                                                                
                                 



Vital Signs

                      



                Code                   CodeSystem                   Vitals      

             

Date                                    Value                

 

                8867-4                   [facility]                   Heart Rate     

              

2020                              101 /min                

 

                    8480-6                   [facility]                   Blood Press

ure-Systolic        

                          2020                   90 mm[HG]                

 

                27839-0                   [facility]                   Weight        

           

2020                              234 [lb_av]                

 

                82424-9                   [facility]                   BMI           

        

2020                              41.45 (lb/in2)                

 

                8302-2                   [facility]                   Height         

          

2020                              63 [in_i]                

 

                    8462-4                   [facility]                   Blood Press

ure-Diastolic       

                          2020                   130 mm[HG]               

 



                                                                                
              



Social History

                      



                    Element Description                   Description           

        Start Date  

                    End Date                   Code                   CodeSystem

   

                                        AdditionalInfo                

 

                    SexAssignedAtBirth                   Female                 

  1970        

                                        F                   AdministrativeGender

          

                                                        



                                                                                
    



Hospital Discharge Instructions

                                    * 



                                                                                
                                    



Reason For Referral

                      





                                                                                
    



Medical Equipment

                                    *     FDA



                                                                                
                



Assessments

                                    * 



                                                                                
      



Goals Section

                      



                          Goals                     Planned DateTime            

    

 

                                        Kath will report that her level of dep

ression(irritability) is a 3 or less on 

a scale of 1 low and 10 high most days during the treatment period(90 days).    
                                        2017

## 2021-10-16 NOTE — CCD
Continuity of Care Document (CCD)

                             Created on: 10/11/2021



Kath Ken

External Reference #: [id number]

: 1970

Sex: Female



Demographics





                          Address                   [address] [place] [address]  [address]

 

                          Home Phone                [phone]

 

                          Preferred Language        Unknown

 

                          Marital Status            Unknown

 

                          Rastafari Affiliation     Unknown

 

                          Race                      White

 

                          Ethnic Group              Not  or 





Author





                          Author                    Kath JAMISON M.D.

 

                          Organization              Unknown

 

                          Address                   [address] [place] [address]  [address]



 

                          Phone                     [phone]







Care Team Providers





                    Care Team Member Name Role                Phone

 

                    Yamil Vences M.D. AUTM                [phone]







Problems





                    Active Problems     Provider            Date

 

                    Ischemic stroke     Colleen Jamison M.D. Onset: 2017







Social History





                Type            Date            Description     Comments

 

                Birth Sex                       Unknown          

 

                Tobacco Use     Start: Unknown End: Unknown Patient is a former 

smoker  







Allergies and adverse reactions





             Active Allergies Criticality  Reaction | Severity Comments     Date

 

             Pioglitazone Unable to assess criticality              pedal edema 

 2019

 

                                        Inactive Allergies

 

             NKDA         Unable to assess criticality                          

 2017







Medications





           Active Medications SIG        Qnty       Indications Ordering Provide

r Date

 

                          Diazepam                     5mg Tablets              

     take 1 tab 30-60 

minutes prior to mri. 1uli Jamison M.D. 2019

 

                          Baclofen                     10mg Tablets             

      Take One Tablet By 

Mouth @7Am and Take One Tablet By Mouth @1PM and Take One Tablet By Mouth @7PM 

90uli Jamison M.D. 2017

 

                          Nortriptyline HCL                     25mg Capsules   

                Take One 

Capsule By Mouth @11PM 30sanjiv Jamison M.D. 

 

                          Clopidogrel Bisulfate                     75mg Tablets

                   Take 

One Tablet By Mouth Once Daily 30uli Jamison M.D. 2017

 

                          Atorvastatin Calcium                     80mg Tablets 

                  Take One

Tablet By Mouth AT Bedtime 30uli Jamison M.D.

 2017







Immunizations





                                        Description

 

                                        No Information Available







Vital Signs





                Date            Vital           Result          Comment

 

                2021  6:16am BP Systolic     130 mmHg         

 

                    BP Diastolic        80 mmHg              

 

                    Heart Rate          80 /min              

 

                    Respiratory Rate    20 /min              

 

                2021  8:50am BP Systolic     110 mmHg         

 

                    BP Diastolic        80 mmHg              

 

                    Heart Rate          76 /min              

 

                    Respiratory Rate    16 /min              







Results





                                        Description

 

                                        No Information Available







Procedures





                Date            Code            Description     Status

 

                10/11/2021      10153           Injection For Nerve Block, Other

 Peripheral Nerve Or Branch 

Completed

 

                10/11/2021      32813           Inj, Anesth Agent, Trigeminal Co

mpleted

 

                    10/11/2021          62755               Injection Single Or 

Multiple Trigger Points Three Or More 

Muscles                                 Completed

 

                2021      02994           Office/Outpatient Established Mo

d MDM 30-39 Min Completed

 

                2021      34004           Injection For Nerve Block, Other

 Peripheral Nerve Or Branch 

Completed

 

                2021      17787           Inj, Anesth Agent, Trigeminal Co

mpleted

 

                    2021          26809               Injection Single Or 

Multiple Trigger Points Three Or More 

Muscles                                 Completed

 

                2021      07163           Office/Outpatient Established Mo

d MDM 30-39 Min Completed

 

                2021      66122           Injection For Nerve Block, Other

 Peripheral Nerve Or Branch 

Completed

 

                2021      23741           Inj, Anesth Agent, Trigeminal Co

mpleted

 

                    2021          37730               Injection Single Or 

Multiple Trigger Points Three Or More 

Muscles                                 Completed

 

                2021      76888           Injection For Nerve Block, Other

 Peripheral Nerve Or Branch 

Completed

 

                2021      21708           Inj, Anesth Agent, Trigeminal Co

mpleted

 

                    2021          48262               Injection Single Or 

Multiple Trigger Points Three Or More 

Muscles                                 Completed







Medical Devices





                                        Description

 

                                        No Information Available







Encounters





           Type       Date       Location   Provider   Dx         Diagnosis

 

             Office Visit 2021 11:15a Main office - [place] JHONNY Hernandez.A.-CChelle 

G43.719                                 Chronic migraine w/o aura, intractable, 

w/o stat migr

 

                          M54.81                    Occipital neuralgia

 

                          G44.82                    Headache associated with sex

ual activity

 

                          I63.09                    Cerebral infarction due to t

hrombosis of precerebral artery

 

                          M54.2                     Cervicalgia

 

                          G56.02                    Carpal tunnel syndrome, left

 upper limb

 

                          F45.8                     Other somatoform disorders

 

             Office Visit 2021  1:45p Main office - [place] JHONNY Hernandez.KELLY.-C. 

M54.81                                  Occipital neuralgia

 

                          G43.719                   Chronic migraine w/o aura, i

ntractable, w/o stat migr

 

                          G44.82                    Headache associated with sex

ual activity

 

                          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

ilateral temporomandibular joint

 

                          I63.09                    Cerebral infarction due to t

hrombosis of precerebral artery

 

                          M54.2                     Cervicalgia

 

                          G56.02                    Carpal tunnel syndrome, left

 upper limb







Assessments





                Date            Code            Description     Provider

 

                10/11/2021      M54.81          Occipital neuralgia Colleen francois M.D.

 

                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

 

                10/11/2021      G50.0           Trigeminal neuralgia Colleen arriaza M.D.

 

                2021      G43.719         Chronic migraine without aura, i

ntractable, without status m 

JHONNY Ferrara.A.-C.

 

                2021      M54.81          Occipital neuralgia JHONNY Hernandez.A.-C.

 

                2021      G44.82          Headache associated with sexual 

activity JHONNY Ferrara.A.-C.

 

                    2021          I63.09              Cerebral infarction 

due to thrombosis of other precerebral 

artery                                  Chelsea Archer P.A.-C.

 

                2021      M54.2           Cervicalgia     Chelsea Archer

 P.A.-C.

 

                2021      G56.02          Carpal tunnel syndrome, left upp

er limb JHONNY Ferrara.A.-C.

 

                2021      F45.8           Other somatoform disorders JHONNY Ferrara.A.-C.

 

                2021      G50.0           Trigeminal neuralgia Colleen arriaza M.D.

 

                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

 

                2021      M54.81          Occipital neuralgia Colleen francois M.D.

 

                2021      M54.81          Occipital neuralgia JHONNY Hernandez.A.-C.

 

                2021      G43.719         Chronic migraine without aura, i

ntractable, without status m 

JHONNY Ferrara.A.-C.

 

                2021      G44.82          Headache associated with sexual 

activity JESSICA Ferrara-C.

 

                2021      M26.633         Articular disc disorder of bilat

eral temporomandibular joint 

RAYMOND FerraraA.-C.

 

                    2021          I63.09              Cerebral infarction 

due to thrombosis of other precerebral 

artery                                  JESSICA Ferrara-C.

 

                2021      M54.2           Cervicalgia     RAYMOND FerraraA.-C.

 

                2021      G56.02          Carpal tunnel syndrome, left upp

er limb JESSICA Ferrara-ALEXX

 

                2021      M54.81          Occipital neuralgia Colleen francois M.D.

 

                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

 

                2021      G50.0           Trigeminal neuralgia Colleen arriaza M.D.

 

                2021      M54.81          Occipital neuralgia Colleen francois M.D.

 

                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

 

                2021      G50.0           Trigeminal neuralgia Colleen arriaza M.D.







Plan of Treatment

Future Appointment(s):* 11/15/2021  3:35 pm - Colleen Jamison M.D. at [facility]

* 2021 10:30 am - Chelsea Archer P.A.-C. at [facility]

2021 - Chelsea Archer P.A.-C.* G43.719 Chronic migraine without aura, 
  intractable, without status m* Comments:* Improved.





* M54.81 Occipital neuralgia* Comments:* Continue nerve blocks.





* G44.82 Headache associated with sexual activity* Comments:* Not recurrent.





* I63.09 Cerebral infarction due to thrombosis of other precerebral artery* 
  Comments:* She continues Plavix and statin.





* M54.2 Cervicalgia* Comments:* Controlled with ROM exercises and stretching.





* G56.02 Carpal tunnel syndrome, left upper limb* Comments:* Follow up with Dr Daily.





* F45.8 Other somatoform disorders* Comments:* She will monitor for teeth 
  clenching.



* Follow up:* 3 months









Functional Status





                                        Description

 

                                        No Information Available







Mental Status





                                        Description

 

                                        No Information Available







Referrals





                                        Description

 

                                        No Information Available

## 2021-10-16 NOTE — CCD
Continuity of Care Document (CCD)

                             Created on: 10/11/2021



Kath Ken

External Reference #: [id number]

: 1970

Sex: Female



Demographics





                          Address                   [address] [place] [address]  [address]

 

                          Home Phone                [phone]

 

                          Preferred Language        Unknown

 

                          Marital Status            Unknown

 

                          Evangelical Affiliation     Unknown

 

                          Race                      White

 

                          Ethnic Group              Not  or 





Author





                          Author                    Kath JAMISON M.D.

 

                          Organization              Unknown

 

                          Address                   [address] [place] [address]  [address]



 

                          Phone                     [phone]







Care Team Providers





                    Care Team Member Name Role                Phone

 

                    Yamil Vences M.D. AUTM                [phone]







Problems





                    Active Problems     Provider            Date

 

                    Ischemic stroke     Colleen Jamison M.D. Onset: 2017







Social History





                Type            Date            Description     Comments

 

                Birth Sex                       Unknown          

 

                Tobacco Use     Start: Unknown End: Unknown Patient is a former 

smoker  







Allergies and adverse reactions





             Active Allergies Criticality  Reaction | Severity Comments     Date

 

             Pioglitazone Unable to assess criticality              pedal edema 

 2019

 

                                        Inactive Allergies

 

             NKDA         Unable to assess criticality                          

 2017







Medications





           Active Medications SIG        Qnty       Indications Ordering Provide

r Date

 

                          Diazepam                     5mg Tablets              

     take 1 tab 30-60 

minutes prior to mri. 1uli Jamison M.D. 2019

 

                          Baclofen                     10mg Tablets             

      Take One Tablet By 

Mouth @7Am and Take One Tablet By Mouth @1PM and Take One Tablet By Mouth @7PM 

90uli Jamison M.D. 2017

 

                          Nortriptyline HCL                     25mg Capsules   

                Take One 

Capsule By Mouth @11PM 30sanjiv Jamison M.D. 

 

                          Clopidogrel Bisulfate                     75mg Tablets

                   Take 

One Tablet By Mouth Once Daily 30uli Jamison M.D. 2017

 

                          Atorvastatin Calcium                     80mg Tablets 

                  Take One

Tablet By Mouth AT Bedtime 30uli Jamison M.D.

 2017







Immunizations





                                        Description

 

                                        No Information Available







Vital Signs





                Date            Vital           Result          Comment

 

                2021  6:16am BP Systolic     130 mmHg         

 

                    BP Diastolic        80 mmHg              

 

                    Heart Rate          80 /min              

 

                    Respiratory Rate    20 /min              

 

                2021  8:50am BP Systolic     110 mmHg         

 

                    BP Diastolic        80 mmHg              

 

                    Heart Rate          76 /min              

 

                    Respiratory Rate    16 /min              







Results





                                        Description

 

                                        No Information Available







Procedures





                Date            Code            Description     Status

 

                10/11/2021      33722           Injection For Nerve Block, Other

 Peripheral Nerve Or Branch 

Completed

 

                10/11/2021      45329           Inj, Anesth Agent, Trigeminal Co

mpleted

 

                    10/11/2021          56880               Injection Single Or 

Multiple Trigger Points Three Or More 

Muscles                                 Completed

 

                2021      99928           Office/Outpatient Established Mo

d MDM 30-39 Min Completed

 

                2021      74870           Injection For Nerve Block, Other

 Peripheral Nerve Or Branch 

Completed

 

                2021      14394           Inj, Anesth Agent, Trigeminal Co

mpleted

 

                    2021          73925               Injection Single Or 

Multiple Trigger Points Three Or More 

Muscles                                 Completed

 

                2021      41848           Office/Outpatient Established Mo

d MDM 30-39 Min Completed

 

                2021      60688           Injection For Nerve Block, Other

 Peripheral Nerve Or Branch 

Completed

 

                2021      05650           Inj, Anesth Agent, Trigeminal Co

mpleted

 

                    2021          86177               Injection Single Or 

Multiple Trigger Points Three Or More 

Muscles                                 Completed

 

                2021      38244           Injection For Nerve Block, Other

 Peripheral Nerve Or Branch 

Completed

 

                2021      63994           Inj, Anesth Agent, Trigeminal Co

mpleted

 

                    2021          18300               Injection Single Or 

Multiple Trigger Points Three Or More 

Muscles                                 Completed







Medical Devices





                                        Description

 

                                        No Information Available







Encounters





           Type       Date       Location   Provider   Dx         Diagnosis

 

             Office Visit 2021 11:15a Main office - [place] JHONNY Hernandez.A.-CChelle 

G43.719                                 Chronic migraine w/o aura, intractable, 

w/o stat migr

 

                          M54.81                    Occipital neuralgia

 

                          G44.82                    Headache associated with sex

ual activity

 

                          I63.09                    Cerebral infarction due to t

hrombosis of precerebral artery

 

                          M54.2                     Cervicalgia

 

                          G56.02                    Carpal tunnel syndrome, left

 upper limb

 

                          F45.8                     Other somatoform disorders

 

             Office Visit 2021  1:45p Main office - [place] JHONNY Hernandez.KELLY.-C. 

M54.81                                  Occipital neuralgia

 

                          G43.719                   Chronic migraine w/o aura, i

ntractable, w/o stat migr

 

                          G44.82                    Headache associated with sex

ual activity

 

                          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

ilateral temporomandibular joint

 

                          I63.09                    Cerebral infarction due to t

hrombosis of precerebral artery

 

                          M54.2                     Cervicalgia

 

                          G56.02                    Carpal tunnel syndrome, left

 upper limb







Assessments





                Date            Code            Description     Provider

 

                10/11/2021      M54.81          Occipital neuralgia Colleen francois M.D.

 

                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

 

                10/11/2021      G50.0           Trigeminal neuralgia Colleen arriaza M.D.

 

                2021      G43.719         Chronic migraine without aura, i

ntractable, without status m 

JHONNY Ferrara.A.-C.

 

                2021      M54.81          Occipital neuralgia JHONNY Hernandez.A.-C.

 

                2021      G44.82          Headache associated with sexual 

activity JHONNY eFrrara.A.-C.

 

                    2021          I63.09              Cerebral infarction 

due to thrombosis of other precerebral 

artery                                  Chelsea Archer P.A.-C.

 

                2021      M54.2           Cervicalgia     Chelsea Archer

 P.A.-C.

 

                2021      G56.02          Carpal tunnel syndrome, left upp

er limb JHONNY Ferrara.A.-C.

 

                2021      F45.8           Other somatoform disorders JHONNY Ferrara.A.-C.

 

                2021      G50.0           Trigeminal neuralgia Colleen arriaza M.D.

 

                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

 

                2021      M54.81          Occipital neuralgia Colleen francois M.D.

 

                2021      M54.81          Occipital neuralgia JHONNY Hernandez.A.-C.

 

                2021      G43.719         Chronic migraine without aura, i

ntractable, without status m 

JHONNY Ferrara.A.-C.

 

                2021      G44.82          Headache associated with sexual 

activity JESSICA Ferrara-C.

 

                2021      M26.633         Articular disc disorder of bilat

eral temporomandibular joint 

RAYMOND FerraraA.-C.

 

                    2021          I63.09              Cerebral infarction 

due to thrombosis of other precerebral 

artery                                  JESSICA Ferrara-C.

 

                2021      M54.2           Cervicalgia     RAYMOND FerraraA.-C.

 

                2021      G56.02          Carpal tunnel syndrome, left upp

er limb JESSICA Ferrara-ALEXX

 

                2021      M54.81          Occipital neuralgia Colleen francois M.D.

 

                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

 

                2021      G50.0           Trigeminal neuralgia Colleen arriaza M.D.

 

                2021      M54.81          Occipital neuralgia Colleen francois M.D.

 

                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

 

                2021      G50.0           Trigeminal neuralgia Colleen arriaza M.D.







Plan of Treatment

Future Appointment(s):* 11/15/2021  3:35 pm - Colleen Jamison M.D. at [facility]

* 2021 10:30 am - Chelsea Archer P.A.-C. at [facility]

2021 - Chelsea Archer P.A.-C.* G43.719 Chronic migraine without aura, 
  intractable, without status m* Comments:* Improved.





* M54.81 Occipital neuralgia* Comments:* Continue nerve blocks.





* G44.82 Headache associated with sexual activity* Comments:* Not recurrent.





* I63.09 Cerebral infarction due to thrombosis of other precerebral artery* 
  Comments:* She continues Plavix and statin.





* M54.2 Cervicalgia* Comments:* Controlled with ROM exercises and stretching.





* G56.02 Carpal tunnel syndrome, left upper limb* Comments:* Follow up with Dr Daily.





* F45.8 Other somatoform disorders* Comments:* She will monitor for teeth 
  clenching.



* Follow up:* 3 months









Functional Status





                                        Description

 

                                        No Information Available







Mental Status





                                        Description

 

                                        No Information Available







Referrals





                                        Description

 

                                        No Information Available

## 2021-10-16 NOTE — CCD
Continuity of Care Document (CCD)

                             Created on: 2021



Kath Ken

External Reference #: [id number]

: 1970

Sex: Female



Demographics





                          Address                   [address] [place] [address]  [address]

 

                          Home Phone                [phone]

 

                          Preferred Language        Unknown

 

                          Marital Status            Unknown

 

                          Islam Affiliation     Unknown

 

                          Race                      White

 

                          Ethnic Group              Not  or 





Author





                          Author                    [facility]

 

                          Organization              Unknown

 

                          Address                   [address] [place] [address]  [address]



 

                          Phone                     [phone]







Care Team Providers





                    Care Team Member Name Role                Phone

 

                    Yamil Vences M.D. AUTM                [phone]







Problems





                    Active Problems     Provider            Date

 

                    Ischemic stroke     Colleen Noyola M.D. Onset: 2017







Social History





                Type            Date            Description     Comments

 

                Birth Sex                       Unknown          

 

                Tobacco Use     Start: Unknown End: Unknown Patient is a former 

smoker  







Allergies, Adverse Reactions, Alerts





             Active Allergies Criticality  Reaction | Severity Comments     Date

 

             Pioglitazone Unable to assess criticality              pedal edema 

 2019

 

                                        Inactive Allergies

 

             NKDA         Unable to assess criticality                          

 2017







Medications





           Active Medications SIG        Qnty       Indications Ordering Provide

r Date

 

                          Diazepam                     5mg Tablets              

     take 1 tab 30-60 

minutes prior to mri. 1uli Noyola M.D. 2019

 

                          Baclofen                     10mg Tablets             

      Take One Tablet By 

Mouth @7Am and Take One Tablet By Mouth @1PM and Take One Tablet By Mouth @7PM 

90uli Noyola M.D. 2017

 

                          Nortriptyline HCL                     25mg Capsules   

                Take One 

Capsule By Mouth @11PM 30sanjiv Noyola M.D. 

 

                          Clopidogrel Bisulfate                     75mg Tablets

                   Take 

One Tablet By Mouth Once Daily 30uli Noyola M.D. 2017

 

                          Atorvastatin Calcium                     80mg Tablets 

                  Take One

Tablet By Mouth AT Bedtime 30uli Noyola M.D.

 2017







Immunizations





                                        Description

 

                                        No Information Available







Vital Signs





                Date            Vital           Result          Comment

 

                2021  8:50am BP Systolic     110 mmHg         

 

                    BP Diastolic        80 mmHg              

 

                    Heart Rate          76 /min              

 

                    Respiratory Rate    16 /min              

 

                2019  7:16am BP Systolic     118 mmHg         

 

                    BP Diastolic        80 mmHg              

 

                    Heart Rate          76 /min              

 

                    Respiratory Rate    16 /min              







Results





                                        Description

 

                                        No Information Available







Procedures





                Date            Code            Description     Status

 

                2021      06116           Injection For Nerve Block, Other

 Peripheral Nerve Or Branch 

Completed

 

                2021      99275           Inj, Anesth Agent, Trigeminal Co

mpleted

 

                    2021          24881               Injection Single Or 

Multiple Trigger Points Three Or More 

Muscles                                 Completed

 

                2021      82748           Office/Outpatient Established Mo

d MDM 30-39 Min Completed

 

                2021      88640           Injection For Nerve Block, Other

 Peripheral Nerve Or Branch 

Completed

 

                2021      07502           Inj, Anesth Agent, Trigeminal Co

mpleted

 

                    2021          68600               Injection Single Or 

Multiple Trigger Points Three Or More 

Muscles                                 Completed

 

                2021      00272           Injection For Nerve Block, Other

 Peripheral Nerve Or Branch 

Completed

 

                2021      77857           Inj, Anesth Agent, Trigeminal Co

mpleted

 

                    2021          18069               Injection Single Or 

Multiple Trigger Points Three Or More 

Muscles                                 Completed







Medical Devices





                                        Description

 

                                        No Information Available







Encounters





           Type       Date       Location   Provider   Dx         Diagnosis

 

             Office Visit 2021  1:45p Main office - [place] 

M54.81                                  Occipital neuralgia

 

                          G43.719                   Chronic migraine w/o aura, i

ntractable, w/o stat migr

 

                          G44.82                    Headache associated with sex

ual activity

 

                          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

ilateral temporomandibular joint

 

                          I63.09                    Cerebral infarction due to t

hrombosis of precerebral artery

 

                          M54.2                     Cervicalgia

 

                          G56.02                    Carpal tunnel syndrome, left

 upper limb







Assessments





                Date            Code            Description     Provider

 

                2021      G43.719         Chronic migraine without aura, i

ntractable, without status m 

[facility]

 

                2021      M54.81          Occipital neuralgia JESSICA Hernandez-CChelle

 

                2021      G44.82          Headache associated with sexual 

activity SHILPA FerraraCChelle

 

                    2021          I63.09              Cerebral infarction 

due to thrombosis of other precerebral 

artery                                  [facility]

 

                2021      M26.633         Articular disc disorder of bilat

eral temporomandibular joint 

[facility]

 

                2021      M54.2           Cervicalgia     [facility],

 [facility]

 

                2021      G56.02          Carpal tunnel syndrome, left upp

er limb [facility] 

[facility]

 

                2021      G50.0           Trigeminal neuralgia Colleen arriaza M.D.

 

                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

 

                2021      M54.81          Occipital neuralgia Colleen francois M.D.

 

                2021      M54.81          Occipital neuralgia [facility], [facility]

 

                2021      G43.719         Chronic migraine without aura, i

ntractable, without status m 

[facility]

 

                2021      G44.82          Headache associated with sexual 

activity [facility] 

[facility]

 

                2021      M26.633         Articular disc disorder of bilat

eral temporomandibular joint 

[facility], [facility]

 

                    2021          I63.09              Cerebral infarction 

due to thrombosis of other precerebral 

artery                                  [facility]

 

                2021      M54.2           Cervicalgia     [facility]

 [facility]

 

                2021      G56.02          Carpal tunnel syndrome, left upp

er limb [facility] 

[facility]

 

                2021      M54.81          Occipital neuralgia Colleen francois M.D.

 

                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

 

                2021      G50.0           Trigeminal neuralgia Colleen arriaza M.D.

 

                2021      M54.81          Occipital neuralgia Colleen francois M.D.

 

                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

 

                2021      G50.0           Trigeminal neuralgia Colleen arriaza M.D.







Plan of Treatment





No Information Available



Functional Status





                                        Description

 

                                        No Information Available







Mental Status





                                        Description

 

                                        No Information Available







Referrals





                                        Description

 

                                        No Information Available

## 2021-10-16 NOTE — CCD
Summarization Of Episode

                             Created on: 10/16/2021



VELMA BECK

External Reference #: [id number]

: 1970

Sex: Undifferentiated



Demographics





                          Address                   [address] [place] [address]  [address]

 

                          Home Phone                [phone]

 

                          Preferred Language        English

 

                          Marital Status            Unknown

 

                          Zoroastrianism Affiliation     Unknown

 

                          Race                      Unknown

 

                          Ethnic Group              Not  or 





Author





                          Author                    [facility]

 

                          Organization              [facility]

 

                          Address                   Unknown

 

                          Phone                     Unavailable







Support





                Name            Relationship    Address         Phone

 

                DISABLED        Next Of Kin     Unknown         Unavailable

 

                    GRUPO,  MASOUD        Next Of Kin         [address] [place]  [address] [phone]

 

                    Onur MAYOma MCKEE   Next Of Kin         [address] [place], [place]  [phone]

 

                CONTACTS,  NO   Next Of Kin     Unknown         Unavailable

 

                    CARFRLUCÍA            Next Of Kin         [address] [place], [place]  [id number] [phone]

 

                    LINDA TALAMANTES    Next Of Kin         [address] [place], [place]  [id number] [phone]

 

                 [employer] Next Of Kin     Unknown         Unavailable

 

                KUN PEREIRA  Next Of Kin     Unknown         [phone]

 

                UE              Next Of Kin     Unknown         Unavailable

 

                    JAS ASKEW    Next Of Kin         [address], [place]  [phone]







Care Team Providers





                    Care Team Member Name Role                Phone

 

                    Trickey, J Chelsea PA  Unavailable         Unavailable

 

                    Trickey, J Chelsea PA  Unavailable         Unavailable

 

                    Trickey, J Chelsea PA  Unavailable         Unavailable

 

                    Trickey, J Chelsea PA  Unavailable         Unavailable

 

                    Trickey, J Chelsea PA  Unavailable         Unavailable

 

                    Trickey, J Chelsea PA  Unavailable         Unavailable

 

                    Trickey, J Chelsea PA  Unavailable         Unavailable

 

                    Trickey, J Chelsea PA  Unavailable         Unavailable

 

                    Trickey, J Chelsea PA  Unavailable         Unavailable

 

                    Trickey, J Chelsea PA  Unavailable         Unavailable

 

                    Trickey, J Chelsea PA  Unavailable         Unavailable

 

                    Trickey, J Chelsea PA  Unavailable         Unavailable

 

                    Trickey, J Chelsea PA  Unavailable         Unavailable

 

                    Trickey, J Chelsea PA  Unavailable         Unavailable

 

                    Trickey, J Chelsea PA  Unavailable         Unavailable

 

                    Trickey, J Chelsea PA  Unavailable         Unavailable

 

                    Trickey, J Chelsea PA  Unavailable         Unavailable

 

                    Trickey, J Chelsea PA  Unavailable         Unavailable

 

                    Trickey, J Chelsea PA  Unavailable         Unavailable

 

                    Trickey, J Chelsea PA  Unavailable         Unavailable

 

                    Trickey, J Chelsea PA  Unavailable         Unavailable

 

                    Trickey, J Chelsea PA  Unavailable         Unavailable

 

                    Trickey, J Chelsea PA  Unavailable         Unavailable

 

                    Trickey, J Chelsea PA  Unavailable         Unavailable

 

                    Trickey, J Chelsea PA  Unavailable         Unavailable

 

                    Trickey, J Chelsea PA  Unavailable         Unavailable

 

                    Trickey, J Chelsea PA  Unavailable         Unavailable

 

                    Trickey, J Chelsea PA  Unavailable         Unavailable

 

                    Trickey, J Chelsea PA  Unavailable         Unavailable

 

                    Trickey, J Chelsea PA  Unavailable         Unavailable

 

                    Trickey, J Chelsea PA  Unavailable         Unavailable

 

                    Trickey, J Chelsea PA  Unavailable         Unavailable

 

                    Trickey, J Chelsea PA  Unavailable         Unavailable

 

                    Trickey, J Chelsea PA  Unavailable         Unavailable

 

                    Trickey, J Chelsea PA  Unavailable         Unavailable

 

                    Trickey, J Chelsea PA  Unavailable         Unavailable

 

                    Trickey, J Chelsea PA  Unavailable         Unavailable

 

                    Trickey, J Chelsea PA  Unavailable         Unavailable

 

                    Trickey, J Chelsea PA  Unavailable         Unavailable

 

                    Trickey, J Chelsea PA  Unavailable         Unavailable

 

                    Trickey, J Chelsea PA  Unavailable         Unavailable

 

                    Trickey, J Chelsea PA  Unavailable         Unavailable

 

                    Trickey, J Chelsea PA  Unavailable         Unavailable

 

                    Trickey, J Chelsea PA  Unavailable         Unavailable

 

                    Trickey, J Chelsea PA  Unavailable         Unavailable

 

                    Trickey, J Chelsea PA  Unavailable         Unavailable

 

                    Trickey, J Chelsea PA  Unavailable         Unavailable

 

                    Trickey, J Chelsea PA  Unavailable         Unavailable

 

                    Trickey, J Chelsea PA  Unavailable         Unavailable



                                  



Re-disclosure Warning

          The records that you are about to access may contain information from 
federally-assisted alcohol or drug abuse programs. If such information is 
present, then the following federally mandated warning applies: This information
has been disclosed to you from records protected by federal confidentiality 
rules (42 CFR part 2). The federal rules prohibit you from making any further 
disclosure of this information unless further disclosure is expressly permitted 
by the written consent of the person to whom it pertains or as otherwise 
permitted by 42 CFR part 2. A general authorization for the release of medical 
or other information is NOT sufficient for this purpose. The Federal rules 
restrict any use of the information to criminally investigate or prosecute any 
alcohol or drug abuse patient.The records that you are about to access may 
contain highly sensitive health information, the redisclosure of which is 
protected by Article 27-F of the [facility] Public Health law. If you 
continue you may have access to information: Regarding HIV / AIDS; Provided by 
facilities licensed or operated by the [facility] Office of Mental Health; 
or Provided by the [facility] Office for People With Developmental 
Disabilities. If such information is present, then the following [place] 
mandated warning applies: This information has been disclosed to you from 
confidential records which are protected by state law. State law prohibits you 
from making any further disclosure of this information without the specific 
written consent of the person to whom it pertains, or as otherwise permitted by 
law. Any unauthorized further disclosure in violation of state law may result in
a fine or detention sentence or both. A general authorization for the release of 
medical or other information is NOT sufficient authorization for further disc
losure.                                                                         
    



Family History

          



             Family Member Name Family Member Gender Family Member Status Date o

f Status 

Description                             Data Source(s)

 

           Unknown    Unknown    Problem                          [facility] ([facility], )



                                                                                
       



Encounters

          



           Encounter  Providers  Location   Date       Indications Data Source(s

)

 

                Outpatient      Attender: Chelsea ALW Main office - [facility]

n 2021 11:15:00

AM EDT                                              [facility] ([facility] DANIELLE Malin)

 

                Telehealth Physchotherapy 30 Minutes with Patient               

  [facility] 

2021 12:00:00 AM EDT                           [facility] ([facility]

[facility] 

[facility])

 

                Telehealth Physchotherapy 30 Minutes with Patient               

  [facility] 

2021 12:00:00 AM EDT                           [facility] ([facility]

[facility] 

[facility])

 

           Outpatient            [facility] 2021 12:00:00 AM

 EDT            [facility] 

([facility])

 

                Telehealth Physchotherapy 30 Minutes with Patient               

  [facility] 

2021 12:00:00 AM EDT                           [facility] ([facility]

[facility] 

Services)

 

                Telehealth Physchotherapy 30 Minutes with Patient               

  [facility] 

2021 12:00:00 AM EDT                           [facility] ([facility]

ans[facility] Living 

Services)

 

                Telehealth Physchotherapy 30 Minutes with Patient               

  [facility] 

2021 12:00:00 AM EDT                           [facility] ([facility]

[facility] Living 

Services)

 

                Telehealth Physchotherapy 30 Minutes with Patient               

  [facility] 

2021 12:00:00 AM EDT                           [facility] ([facility]

ans[facility] Living 

Services)

 

                Outpatient      Attender: Chelsea LAW [facility] office - [facility]

n 2021 01:45:00

PM EDT                                              [facility] ([facility] Burt bagley )

 

                Telehealth Physchotherapy 30 Minutes with Patient               

  [facility] 

2021 12:00:00 AM EDT                           [facility] ([facility])

 

                Telehealth Physchotherapy 30 Minutes with Patient               

  [facility] 

2021 12:00:00 AM EDT                           [facility] ([facility])

 

           Outpatient            [facility] 2021 12:00:00 AM

 EDT            [facility] 

([facility])

 

                Outpatient      Attender: Chelsea LAW Main office - [facility]

n 2021 11:15:00

AM EDT                                              [facility]

 

                Outpatient      Attender: Chelsea LAW Main office - [place]

n 2020 08:45:00

AM EST                                              [facility] ([facility])



                                                                                
                                                                                
                                                        



Immunizations

          



             Vaccine      Date         Status       Description  Data Source(s)

 

             COVID-19 VACCINE Moderna 2021 12:00:00 AM EDT completed      

           [facility]

 

                                        Vaccine Series Complete: YESThis Data wa

s Submitted to [facility] Via [employer]. 

 

             COVID-19 VACCINE Moderna 2021 12:00:00 AM EDT completed      

           [facility]

 

                                        Vaccine Series Complete: NOThis Data was

 Submitted to [facility] Via [employer]. 



                                                                                
                 



Medications

          



          Medication Brand Name Start Date Product Form Dose      Route     Admi

nistrative 

Instructions Pharmacy Instructions Status     Indications Reaction   Description

 Data 

Source(s)

 

                    24 HR venlafaxine 150 MG Extended Release Oral Capsule [Effe

xor] Effexor XR          

2021 12:00:00 AM EDT        150 mg oral                 active            

   Effexor XR [facility] 

([facility])

 

                    24 HR venlafaxine 75 MG Extended Release Oral Capsule [Effex

or] Effexor XR          

2021 12:00:00 AM EDT        75 mg  oral                 active            

   Effexor XR [facility] 

([facility])

 

                    24 HR venlafaxine 75 MG Extended Release Oral Capsule [Effex

or] Effexor XR          

2021 12:00:00 AM EDT        75 mg  oral                 active            

   Effexor XR [facility] 

([facility])

 

                    24 HR venlafaxine 150 MG Extended Release Oral Capsule [Effe

xor] Effexor XR          

2021 12:00:00 AM EDT        150 mg oral                 active            

   Effexor XR [facility] 

([facility])

 

                    24 HR venlafaxine 150 MG Extended Release Oral Capsule [Effe

xor] Effexor XR          

2021 12:00:00 AM EDT        150 mg oral                 active            

   Effexor XR [facility] 

([facility])

 

                    24 HR venlafaxine 75 MG Extended Release Oral Capsule [Effex

or] Effexor XR          

2021 12:00:00 AM EDT        75 mg  oral                 active            

   Effexor XR [facility] 

([facility])

 

                    24 HR venlafaxine 75 MG Extended Release Oral Capsule [Effex

or] Effexor XR          

2021 12:00:00 AM EDT        75 mg  oral                 active            

   Effexor XR [facility] 

([facility])

 

                    24 HR venlafaxine 75 MG Extended Release Oral Capsule [Effex

or] Effexor XR          

2021 12:00:00 AM EDT        75 mg  oral                 active            

   Effexor XR [facility] 

([facility])

 

                buspirone hydrochloride 10 MG Oral Tablet buspirone       2021 12:00:00 AM EDT  

        10 mg   oral                    active                  buspirone [facility]

[facility] ([facility] 

[facility])

 

                buspirone hydrochloride 10 MG Oral Tablet buspirone       2021 12:00:00 AM EDT  

        10 mg   oral                    active                  buspirone [facility]

[facility]ose ([facility] 

[facility])

 

                buspirone hydrochloride 10 MG Oral Tablet buspirone       2021 12:00:00 AM EDT  

        10 mg   oral                    active                  buspirone [facility]

[facility] ([facility] 

[facility])

 

                    24 HR venlafaxine 150 MG Extended Release Oral Capsule [Effe

xor] Effexor XR          

2021 12:00:00 AM EDT        150 mg oral                 active            

   Effexor XR [facility] 

([facility])

 

                buspirone hydrochloride 10 MG Oral Tablet buspirone       2021 12:00:00 AM EDT  

        10 mg   oral                    active                  buspirone [facility]

[facility] ([facility] 

[facility])

 

                    24 HR venlafaxine 150 MG Extended Release Oral Capsule [Effe

xor] Effexor XR          

2021 12:00:00 AM EDT        150 mg oral                 active            

   Effexor XR [facility] 

([facility])

 

                    24 HR venlafaxine 75 MG Extended Release Oral Capsule [Effex

or] Effexor XR          

2021 12:00:00 AM EDT        75 mg  oral                 active            

   Effexor XR [facility] 

([facility])

 

                buspirone hydrochloride 10 MG Oral Tablet buspirone       2021 12:00:00 AM EDT  

        10 mg   oral                    active                  [facility]one [facility]

[facility] ([facility] 

[facility])

 

                    24 HR venlafaxine 75 MG Extended Release Oral Capsule [Effex

or] Effexor XR          

2021 12:00:00 AM EDT        75 mg  oral                 active            

   Effexor XR [facility] 

([facility])

 

                    24 HR venlafaxine 150 MG Extended Release Oral Capsule [Effe

xor] Effexor XR          

2021 12:00:00 AM EDT        150 mg oral                 active            

   Effexor XR [facility] 

([facility])

 

                buspirone hydrochloride 10 MG Oral Tablet buspirone       2021 12:00:00 AM EDT  

        10 mg   oral                    active                  [facility]

[facility] ([facility] 

[facility])

 

                buspirone hydrochloride 10 MG Oral Tablet buspirone       2021 12:00:00 AM EDT  

        10 mg   oral                    active                  buspirone [facility]

[facility] ([facility] 

[facility])

 

                    24 HR venlafaxine 150 MG Extended Release Oral Capsule [Effe

xor] Effexor XR          

2021 12:00:00 AM EDT        150 mg oral                 active            

   Effexor XR [facility] 

([facility])

 

                buspirone hydrochloride 10 MG Oral Tablet buspirone       2021 12:00:00 AM EDT  

        10 mg   oral                    active                  bus[facility]one [facility]

[facility] ([facility] 

[facility])

 

                    24 HR venlafaxine 75 MG Extended Release Oral Capsule [Effex

or] Effexor XR          

2021 12:00:00 AM EDT        75 mg  oral                 active            

   Effexor XR [facility] 

([facility])

 

                    24 HR venlafaxine 150 MG Extended Release Oral Capsule [Effe

xor] Effexor XR          

2021 12:00:00 AM EDT        150 mg oral                 active            

   Effexor XR [facility] 

([facility])

 

                buspirone hydrochloride 10 MG Oral Tablet buspirone       2021 12:00:00 AM EDT  

        10 mg   oral                    active                  buspirone [facility]

[facility] ([facility] 

[facility])

 

                    24 HR venlafaxine 150 MG Extended Release Oral Capsule [Effe

xor] Effexor XR          

2021 12:00:00 AM EDT        150 mg oral                 active            

   Effexor XR [facility] 

([facility])

 

                    24 HR venlafaxine 75 MG Extended Release Oral Capsule [Effex

or] Effexor XR          

2021 12:00:00 AM EDT        75 mg  oral                 active            

   Effexor XR [facility] 

([facility])

 

                buspirone hydrochloride 10 MG Oral Tablet buspirone       2021 12:00:00 AM EDT  

        10 mg   oral                    active                  buspirone [facility]

[facility]ose ([facility] 

[facility])

 

                    24 HR venlafaxine 150 MG Extended Release Oral Capsule [Effe

xor] Effexor XR          

2021 12:00:00 AM EDT        150 mg oral                 active            

   Effexor XR [facility] 

([facility])

 

                    24 HR venlafaxine 75 MG Extended Release Oral Capsule [Effex

or] Effexor XR          

2021 12:00:00 AM EDT        75 mg  oral                 active            

   Effexor XR [facility] 

([facility])

 

                    24 HR venlafaxine 75 MG Extended Release Oral Capsule [Effex

or] Effexor XR          

2021 12:00:00 AM EDT        75 mg  oral                 completed         

      Effexor XR [facility] 

([facility])

 

                    24 HR venlafaxine 75 MG Extended Release Oral Capsule [Effex

or] Effexor XR          

2021 12:00:00 AM EDT        75 mg  oral                 completed         

      Effexor XR [facility] 

([facility])

 

                    24 HR venlafaxine 75 MG Extended Release Oral Capsule [Effex

or] Effexor XR          

2021 12:00:00 AM EDT        75 mg  oral                 completed         

      Effexor XR [facility] 

([facility])

 

                    24 HR venlafaxine 75 MG Extended Release Oral Capsule [Effex

or] Effexor XR          

2021 12:00:00 AM EDT        75 mg  oral                 completed         

      Effexor XR [facility] 

([facility])

 

                    24 HR venlafaxine 75 MG Extended Release Oral Capsule [Effex

or] Effexor XR          

2021 12:00:00 AM EDT        75 mg  oral                 completed         

      Effexor XR [facility] 

([facility])

 

                    24 HR venlafaxine 75 MG Extended Release Oral Capsule [Effex

or] Effexor XR          

2021 12:00:00 AM EDT        75 mg  oral                 completed         

      Effexor XR [facility] 

([facility])

 

                    24 HR venlafaxine 75 MG Extended Release Oral Capsule [Effex

or] Effexor XR          

2021 12:00:00 AM EDT        75 mg  oral                 completed         

      Effexor XR [facility] 

([facility])

 

                    24 HR venlafaxine 75 MG Extended Release Oral Capsule [Effex

or] Effexor XR          

2021 12:00:00 AM EDT        75 mg  oral                 completed         

      Effexor XR [facility] 

([facility])

 

                    24 HR venlafaxine 75 MG Extended Release Oral Capsule [Effex

or] Effexor XR          

2021 12:00:00 AM EDT        75 mg  oral                 completed         

      Effexor XR [facility] 

([facility])

 

                    24 HR venlafaxine 75 MG Extended Release Oral Capsule [Effex

or] Effexor XR          

2021 12:00:00 AM EDT        75 mg  oral                 completed         

      Effexor XR [facility] 

([facility])

 

             Mirtazapine 15 MG Oral Tablet [Remeron] Remeron      2021 12:

00:00 AM EDT              15 

mg      oral                    active                  Remeron [facility] ([facility])

 

             Mirtazapine 15 MG Oral Tablet [Remeron] Remeron      2021 12:

00:00 AM EDT              15 

mg      oral                    active                  Remeron [facility] ([facility] [facility] 

[facility])

 

             Mirtazapine 15 MG Oral Tablet [Remeron] Remeron      2021 12:

00:00 AM EDT              15 

mg      oral                    active                  Remeron [employer] ([facility] 

Services)

 

             Mirtazapine 15 MG Oral Tablet [Remeron] Remeron      2021 12:

00:00 AM EDT              15 

mg      oral                    active                  Remeron [employer] ([facility] 

[facility])

 

             Mirtazapine 15 MG Oral Tablet [Remeron] Remeron      2021 12:

00:00 AM EDT              15 

mg      oral                    active                  Remeron [employer] ([facility] 

[facility])

 

             Mirtazapine 15 MG Oral Tablet [Remeron] Remeron      2021 12:

00:00 AM EDT              15 

mg      oral                    active                  Remeron [employer] ([facility] 

[facility])

 

             Mirtazapine 15 MG Oral Tablet [Remeron] Remeron      2021 12:

00:00 AM EDT              15 

mg      oral                    active                  Remeron [employer] ([facility] 

[facility])

 

             Mirtazapine 15 MG Oral Tablet [Remeron] Remeron      2021 12:

00:00 AM EDT              15 

mg      oral                    active                  Remeron [employer] ([facility] 

[facility])

 

             Mirtazapine 15 MG Oral Tablet [Remeron] Remeron      2021 12:

00:00 AM EDT              15 

mg      oral                    active                  Remeron [employer] ([facility] 

[facility])

 

             Mirtazapine 15 MG Oral Tablet [Remeron] Remeron      2021 12:

00:00 AM EDT              15 

mg      oral                    active                  Remeron [employer] ([facility] 

[facility])

 

                    24 HR venlafaxine 150 MG Extended Release Oral Capsule [Effe

xor] Effexor XR          

2021 12:00:00 AM EST        150 mg oral                 completed         

      Effexor XR [employer] 

([facility])

 

                    24 HR venlafaxine 150 MG Extended Release Oral Capsule [Effe

xor] Effexor XR          

2021 12:00:00 AM EST        150 mg oral                 completed         

      Effexor XR [employer] 

([facility])

 

                    24 HR venlafaxine 150 MG Extended Release Oral Capsule [Effe

xor] Effexor XR          

2021 12:00:00 AM EST        150 mg oral                 completed         

      Effexor XR [employer] 

([facility])

 

                    24 HR venlafaxine 150 MG Extended Release Oral Capsule [Effe

xor] Effexor XR          

2021 12:00:00 AM EST        150 mg oral                 completed         

      Effexor XR [employer] 

([facility])

 

                    24 HR venlafaxine 150 MG Extended Release Oral Capsule [Effe

xor] Effexor XR          

2021 12:00:00 AM EST        150 mg oral                 completed         

      Effexor XR [employer] 

([facility])

 

                    24 HR venlafaxine 150 MG Extended Release Oral Capsule [Effe

xor] Effexor XR          

2021 12:00:00 AM EST        150 mg oral                 completed         

      Effexor XR [employer] 

([facility])

 

                    24 HR venlafaxine 150 MG Extended Release Oral Capsule [Effe

xor] Effexor XR          

2021 12:00:00 AM EST        150 mg oral                 completed         

      Effexor XR [employer] 

([facility])

 

                    24 HR venlafaxine 150 MG Extended Release Oral Capsule [Effe

xor] Effexor XR          

2021 12:00:00 AM EST        150 mg oral                 completed         

      Effexor XR [employer] 

([facility])

 

                    24 HR venlafaxine 150 MG Extended Release Oral Capsule [Effe

xor] Effexor XR          

2021 12:00:00 AM EST        150 mg oral                 completed         

      Effexor XR [employer] 

([facility])

 

                    24 HR venlafaxine 150 MG Extended Release Oral Capsule [Effe

xor] Effexor XR          

2021 12:00:00 AM EST        150 mg oral                 completed         

      Effexor XR [employer] 

([facility])

 

                    24 HR venlafaxine 75 MG Extended Release Oral Capsule [Effex

or] Effexor XR          

02/15/2021 12:00:00 AM EST        75 mg  oral                 completed         

      Effexor XR [employer] 

([facility])

 

                    24 HR venlafaxine 75 MG Extended Release Oral Capsule [Effex

or] Effexor XR          

02/15/2021 12:00:00 AM EST        75 mg  oral                 completed         

      Effexor XR [employer] 

([facility])

 

                    24 HR venlafaxine 75 MG Extended Release Oral Capsule [Effex

or] Effexor XR          

02/15/2021 12:00:00 AM EST        75 mg  oral                 completed         

      Effexor XR [employer] 

([facility])

 

                    24 HR venlafaxine 75 MG Extended Release Oral Capsule [Effex

or] Effexor XR          

02/15/2021 12:00:00 AM EST        75 mg  oral                 completed         

      Effexor XR [employer] 

([facility])

 

                    24 HR venlafaxine 75 MG Extended Release Oral Capsule [Effex

or] Effexor XR          

02/15/2021 12:00:00 AM EST        75 mg  oral                 completed         

      Effexor XR [employer] 

([facility])

 

                    24 HR venlafaxine 75 MG Extended Release Oral Capsule [Effex

or] Effexor XR          

02/15/2021 12:00:00 AM EST        75 mg  oral                 completed         

      Effexor XR [employer] 

([facility])

 

                    24 HR venlafaxine 75 MG Extended Release Oral Capsule [Effex

or] Effexor XR          

02/15/2021 12:00:00 AM EST        75 mg  oral                 completed         

      Effexor XR [employer] 

([facility])

 

                    24 HR venlafaxine 75 MG Extended Release Oral Capsule [Effex

or] Effexor XR          

02/15/2021 12:00:00 AM EST        75 mg  oral                 completed         

      Effexor XR [employer] 

([facility])

 

                    24 HR venlafaxine 75 MG Extended Release Oral Capsule [Effex

or] Effexor XR          

02/15/2021 12:00:00 AM EST        75 mg  oral                 completed         

      Effexor XR [employer] 

([facility])

 

                    24 HR venlafaxine 75 MG Extended Release Oral Capsule [Effex

or] Effexor XR          

02/15/2021 12:00:00 AM EST        75 mg  oral                 completed         

      Effexor XR [employer] 

([facility])

 

             Mirtazapine 15 MG Oral Tablet [Remeron] Remeron      2021 12:

00:00 AM EST              15 

mg      oral                    completed                 Remeron [employer] ([facility] 

Services)

 

             Mirtazapine 15 MG Oral Tablet [Remeron] Remeron      2021 12:

00:00 AM EST              15 

mg      oral                    completed                 Remeron [employer] ([facility] 

Services)

 

             Mirtazapine 15 MG Oral Tablet [Remeron] Remeron      2021 12:

00:00 AM EST              15 

mg      oral                    completed                 Remeron [employer] ([facility] 

Services)

 

             Mirtazapine 15 MG Oral Tablet [Remeron] Remeron      2021 12:

00:00 AM EST              15 

mg      oral                    completed                 Remeron [employer] ([facility] 

Services)

 

             Mirtazapine 15 MG Oral Tablet [Remeron] Remeron      2021 12:

00:00 AM EST              15 

mg      oral                    completed                 Remeron [employer] ([facility] 

[facility])

 

             Mirtazapine 15 MG Oral Tablet [Remeron] Remeron      2021 12:

00:00 AM EST              15 

mg      oral                    completed                 Remeron [employer] ([facility] 

[facility])

 

             Mirtazapine 15 MG Oral Tablet [Remeron] Remeron      2021 12:

00:00 AM EST              15 

mg      oral                    completed                 Remeron [employer] ([facility] 

[facility])

 

             Mirtazapine 15 MG Oral Tablet [Remeron] Remeron      2021 12:

00:00 AM EST              15 

mg      oral                    completed                 Remeron [employer] ([facility] 

[facility])

 

             Mirtazapine 15 MG Oral Tablet [Remeron] Remeron      2021 12:

00:00 AM EST              15 

mg      oral                    completed                 Remeron [employer] ([facility] 

[facility])

 

             Mirtazapine 15 MG Oral Tablet [Remeron] Remeron      2021 12:

00:00 AM EST              15 

mg      oral                    completed                 Remeron [employer] ([facility] 

[facility])

 

                    Hydroxyzine Hydrochloride 25 MG Oral Tablet hydroxyzine HCl 

    2021 12:00:00 

AM EST        25 mg  oral                 active               hydroxyzine HCl T

[facility] 

Transitional Living [facility])

 

                    Hydroxyzine Hydrochloride 25 MG Oral Tablet hydroxyzine HCl 

    2021 12:00:00 

AM EST        25 mg  oral                 active               hydroxyzine HCl T

[facility] ([facility] 

Transitional [facility])

 

                    Hydroxyzine Hydrochloride 25 MG Oral Tablet hydroxyzine HCl 

    2021 12:00:00 

AM EST        25 mg  oral                 active               hydroxyzine HCl T

[facility] 

[facility])

 

                    Hydroxyzine Hydrochloride 25 MG Oral Tablet hydroxyzine HCl 

    2021 12:00:00 

AM EST        25 mg  oral                 active               hydroxyzine HCl T

[facility] 

[facility])

 

                    Hydroxyzine Hydrochloride 25 MG Oral Tablet hydroxyzine HCl 

    2021 12:00:00 

AM EST        25 mg  oral                 active               hydroxyzine HCl T

[facility] ([facility])

 

                    Hydroxyzine Hydrochloride 25 MG Oral Tablet hydroxyzine HCl 

    2021 12:00:00 

AM EST        25 mg  oral                 active               hydroxyzine HCl T

[facility])

 

                    Hydroxyzine Hydrochloride 25 MG Oral Tablet hydroxyzine HCl 

    2021 12:00:00 

AM EST        25 mg  oral                 active               hydroxyzine HCl T

[facility] ([facility])

 

                    Hydroxyzine Hydrochloride 25 MG Oral Tablet hydroxyzine HCl 

    2021 12:00:00 

AM EST        25 mg  oral                 active               hydroxyzine HCl T

[facility] ([facility])

 

                    Hydroxyzine Hydrochloride 25 MG Oral Tablet hydroxyzine HCl 

    2021 12:00:00 

AM EST        25 mg  oral                 active               hydroxyzine HCl T

[facility] ([facility])

 

                    Hydroxyzine Hydrochloride 25 MG Oral Tablet hydroxyzine HCl 

    2021 12:00:00 

AM EST        25 mg  oral                 active               hydroxyzine HCl T

[facility] ([facility])

 

                    24 HR venlafaxine 75 MG Extended Release Oral Capsule [Effex

or] Effexor XR          

2021 12:00:00 AM EST        75 mg  oral                 completed         

      Effexor XR [employer] 

([facility])

 

                    24 HR venlafaxine 75 MG Extended Release Oral Capsule [Effex

or] Effexor XR          

2021 12:00:00 AM EST        75 mg  oral                 completed         

      Effexor XR [employer] 

([facility])

 

                    24 HR venlafaxine 75 MG Extended Release Oral Capsule [Effex

or] Effexor XR          

2021 12:00:00 AM EST        75 mg  oral                 completed         

      Effexor XR [employer] 

([facility])

 

                    24 HR venlafaxine 75 MG Extended Release Oral Capsule [Effex

or] Effexor XR          

2021 12:00:00 AM EST        75 mg  oral                 completed         

      Effexor XR [employer] 

([facility])

 

                    24 HR venlafaxine 75 MG Extended Release Oral Capsule [Effex

or] Effexor XR          

2021 12:00:00 AM EST        75 mg  oral                 completed         

      Effexor XR [employer] 

([facility])

 

                    24 HR venlafaxine 75 MG Extended Release Oral Capsule [Effex

or] Effexor XR          

2021 12:00:00 AM EST        75 mg  oral                 completed         

      Effexor XR [employer] 

([facility])

 

                    24 HR venlafaxine 75 MG Extended Release Oral Capsule [Effex

or] Effexor XR          

2021 12:00:00 AM EST        75 mg  oral                 completed         

      Effexor XR [employer] 

([facility])

 

                    24 HR venlafaxine 75 MG Extended Release Oral Capsule [Effex

or] Effexor XR          

2021 12:00:00 AM EST        75 mg  oral                 completed         

      Effexor XR [employer] 

([facility])

 

                    24 HR venlafaxine 75 MG Extended Release Oral Capsule [Effex

or] Effexor XR          

2021 12:00:00 AM EST        75 mg  oral                 completed         

      Effexor XR [employer] 

([facility])

 

                    24 HR venlafaxine 75 MG Extended Release Oral Capsule [Effex

or] Effexor XR          

2021 12:00:00 AM EST        75 mg  oral                 completed         

      Effexor XR [employer] 

([facility])

 

                    24 HR venlafaxine 37.5 MG Extended Release Oral Capsule [Eff

exor] Effexor XR          

2020 12:00:00 AM EST        37.5 mg oral                 completed        

       Effexor XR [employer]

 ([facility])

 

                    24 HR venlafaxine 37.5 MG Extended Release Oral Capsule [Eff

exor] Effexor XR          

2020 12:00:00 AM EST        37.5 mg oral                 completed        

       Effexor XR [employer]

 ([facility])

 

                    24 HR venlafaxine 150 MG Extended Release Oral Capsule [Effe

xor] Effexor XR          

2020 12:00:00 AM EST        150 mg oral                 completed         

      Effexor XR [employer] 

([facility])

 

                          24 HR Bupropion Hydrochloride 300 MG Extended Release 

Oral Tablet [Wellbutrin] 

Wellbutrin XL 2020 12:00:00 AM EST        300 mg oral                 acti

ve               Wellbutrin

 XL                                     [employer] ([facility]

vin Services)

 

                    24 HR venlafaxine 150 MG Extended Release Oral Capsule [Effe

xor] Effexor XR          

2020 12:00:00 AM EST        150 mg oral                 completed         

      Effexor XR [employer] 

([facility])

 

                    24 HR venlafaxine 37.5 MG Extended Release Oral Capsule [Eff

exor] Effexor XR          

2020 12:00:00 AM EST        37.5 mg oral                 completed        

       Effexor XR [employer]

 ([facility])

 

                    24 HR venlafaxine 150 MG Extended Release Oral Capsule [Effe

xor] Effexor XR          

2020 12:00:00 AM EST        150 mg oral                 completed         

      Effexor XR [employer] 

([facility])

 

                    24 HR venlafaxine 150 MG Extended Release Oral Capsule [Effe

xor] Effexor XR          

2020 12:00:00 AM EST        150 mg oral                 completed         

      Effexor XR [employer] 

([facility])

 

                          24 HR Bupropion Hydrochloride 300 MG Extended Release 

Oral Tablet [Wellbutrin] 

Wellbutrin XL 2020 12:00:00 AM EST        300 mg oral                 acti

ve               Wellbutrin

 XL                                     [employer] ([facility]

ving Services)

 

                          24 HR Bupropion Hydrochloride 300 MG Extended Release 

Oral Tablet [Wellbutrin] 

Wellbutrin XL 2020 12:00:00 AM EST        300 mg oral                 acti

ve               Wellbutrin

 XL                                     [employer] ([facility]

[facility] Services)

 

                          24 HR Bupropion Hydrochloride 300 MG Extended Release 

Oral Tablet [Wellbutrin] 

Wellbutrin XL 2020 12:00:00 AM EST        300 mg oral                 acti

ve               Wellbutrin

 XL                                     [employer] ([facility]

ving Services)

 

                    24 HR venlafaxine 150 MG Extended Release Oral Capsule [Effe

xor] Effexor XR          

2020 12:00:00 AM EST        150 mg oral                 completed         

      Effexor XR [employer] 

([facility])

 

                    24 HR venlafaxine 37.5 MG Extended Release Oral Capsule [Eff

exor] Effexor XR          

2020 12:00:00 AM EST        37.5 mg oral                 completed        

       Effexor XR [employer]

 ([facility])

 

                    24 HR venlafaxine 150 MG Extended Release Oral Capsule [Effe

xor] Effexor XR          

2020 12:00:00 AM EST        150 mg oral                 completed         

      Effexor XR [employer] 

([facility])

 

                    24 HR venlafaxine 150 MG Extended Release Oral Capsule [Effe

xor] Effexor XR          

2020 12:00:00 AM EST        150 mg oral                 completed         

      Effexor XR [employer] 

([facility])

 

                    24 HR venlafaxine 37.5 MG Extended Release Oral Capsule [Eff

exor] Effexor XR          

2020 12:00:00 AM EST        37.5 mg oral                 completed        

       Effexor XR [employer]

 ([facility])

 

                          24 HR Bupropion Hydrochloride 300 MG Extended Release 

Oral Tablet [Wellbutrin] 

Wellbutrin XL 2020 12:00:00 AM EST        300 mg oral                 acti

ve               Wellbutrin

 XL                                     [employer] ([facility])

 

                    24 HR venlafaxine 37.5 MG Extended Release Oral Capsule [Eff

exor] Effexor XR          

2020 12:00:00 AM EST        37.5 mg oral                 completed        

       Effexor XR [employer]

 ([facility])

 

                    24 HR venlafaxine 150 MG Extended Release Oral Capsule [Effe

xor] Effexor XR          

2020 12:00:00 AM EST        150 mg oral                 completed         

      Effexor XR [employer] 

([facility])

 

                    24 HR venlafaxine 150 MG Extended Release Oral Capsule [Effe

xor] Effexor XR          

2020 12:00:00 AM EST        150 mg oral                 completed         

      Effexor XR [employer] 

([facility])

 

                          24 HR Bupropion Hydrochloride 300 MG Extended Release 

Oral Tablet [Wellbutrin] 

Wellbutrin XL 2020 12:00:00 AM EST        300 mg oral                 acti

ve               Wellbutrin

 XL                                     [employer] ([facility]

vin Services)

 

                    24 HR venlafaxine 37.5 MG Extended Release Oral Capsule [Eff

exor] Effexor XR          

2020 12:00:00 AM EST        37.5 mg oral                 completed        

       Effexor XR [employer]

 ([facility])

 

                          24 HR Bupropion Hydrochloride 300 MG Extended Release 

Oral Tablet [Wellbutrin] 

Wellbutrin XL 2020 12:00:00 AM EST        300 mg oral                 acti

ve               Wellbutrin

 XL                                     [employer] ([facility]

[facility] Services)

 

                          24 HR Bupropion Hydrochloride 300 MG Extended Release 

Oral Tablet [Wellbutrin] 

Wellbutrin XL 2020 12:00:00 AM EST        300 mg oral                 acti

ve               Wellbutrin

 XL                                     [employer] ([facility]

[facility] Services)

 

                    24 HR venlafaxine 37.5 MG Extended Release Oral Capsule [Eff

exor] Effexor XR          

2020 12:00:00 AM EST        37.5 mg oral                 completed        

       Effexor XR [employer]

 ([facility])

 

                          24 HR Bupropion Hydrochloride 300 MG Extended Release 

Oral Tablet [Wellbutrin] 

Wellbutrin XL 2020 12:00:00 AM EST        300 mg oral                 acti

ve               Wellbutrin

 XL                                     [employer] ([facility]

[facility] Services)

 

                    24 HR venlafaxine 37.5 MG Extended Release Oral Capsule [Eff

exor] Effexor XR          

2020 12:00:00 AM EST        37.5 mg oral                 completed        

       Effexor XR [employer]

 ([facility])

 

                    24 HR venlafaxine 150 MG Extended Release Oral Capsule [Effe

xor] Effexor XR          

2020 12:00:00 AM EST        150 mg oral                 completed         

      Effexor XR [employer] 

([facility])

 

                          24 HR Bupropion Hydrochloride 300 MG Extended Release 

Oral Tablet [Wellbutrin] 

Wellbutrin XL 2020 12:00:00 AM EST        300 mg oral                 acti

ve               Wellbutrin

 XL                                     [employer] ([facility]

ving Services)

 

                    24 HR venlafaxine 37.5 MG Extended Release Oral Capsule [Eff

exor] Effexor XR          

2020 12:00:00 AM EST        37.5 mg oral                 completed        

       Effexor XR [employer]

 ([facility])

 

                    24 HR venlafaxine 37.5 MG Extended Release Oral Capsule [Eff

exor] Effexor XR          

2020 12:00:00 AM EST        37.5 mg oral                 completed        

       Effexor XR [employer]

 ([facility])

 

                    24 HR venlafaxine 150 MG Extended Release Oral Capsule [Effe

xor] Effexor XR          

2020 12:00:00 AM EST        150 mg oral                 completed         

      Effexor XR [employer] 

([facility])

 

                    24 HR venlafaxine 37.5 MG Extended Release Oral Capsule [Eff

exor] Effexor XR          

2020 12:00:00 AM EST        37.5 mg oral                 completed        

       Effexor XR [employer]

 ([facility])

 

                    24 HR venlafaxine 150 MG Extended Release Oral Capsule [Effe

xor] Effexor XR          

2020 12:00:00 AM EST        150 mg oral                 completed         

      Effexor XR [employer] 

([facility])

 

                    24 HR venlafaxine 37.5 MG Extended Release Oral Capsule [Eff

exor] Effexor XR          

2020 12:00:00 AM EST        37.5 mg oral                 completed        

       Effexor XR [employer]

 ([facility])

 

                    24 HR venlafaxine 150 MG Extended Release Oral Capsule [Effe

xor] Effexor XR          

2020 12:00:00 AM EST        150 mg oral                 completed         

      Effexor XR [employer] 

([facility])

 

                    24 HR venlafaxine 37.5 MG Extended Release Oral Capsule [Eff

exor] Effexor XR          

2020 12:00:00 AM EST        37.5 mg oral                 completed        

       Effexor XR [employer]

 ([facility])

 

                    24 HR venlafaxine 150 MG Extended Release Oral Capsule [Effe

xor] Effexor XR          

2020 12:00:00 AM EST        150 mg oral                 completed         

      Effexor XR [employer] 

([facility])

 

             Mirtazapine 15 MG Oral Tablet [Remeron] Remeron      2020 12:

00:00 AM EST              15 

mg      oral                    completed                 Remeron [employer] ([facility])

 

                    24 HR venlafaxine 150 MG Extended Release Oral Capsule [Effe

xor] Effexor XR          

2020 12:00:00 AM EST        150 mg oral                 completed         

      Effexor XR [employer] 

([facility])

 

             Mirtazapine 15 MG Oral Tablet [Remeron] Remeron      2020 12:

00:00 AM EST              15 

mg      oral                    completed                 Remeron [employer] ([facility])

 

                    24 HR venlafaxine 150 MG Extended Release Oral Capsule [Effe

xor] Effexor XR          

2020 12:00:00 AM EST        150 mg oral                 completed         

      Effexor XR [employer] 

([facility])

 

             Mirtazapine 15 MG Oral Tablet [Remeron] Remeron      2020 12:

00:00 AM EST              15 

mg      oral                    completed                 Remeron [employer] ([facility])

 

                    24 HR venlafaxine 37.5 MG Extended Release Oral Capsule [Eff

exor] Effexor XR          

2020 12:00:00 AM EST        37.5 mg oral                 completed        

       Effexor XR [employer]

 ([facility])

 

             Mirtazapine 15 MG Oral Tablet [Remeron] Remeron      2020 12:

00:00 AM EST              15 

mg      oral                    completed                 Remeron [employer] ([facility])

 

                    24 HR venlafaxine 150 MG Extended Release Oral Capsule [Effe

xor] Effexor XR          

2020 12:00:00 AM EST        150 mg oral                 completed         

      Effexor XR [employer] 

([facility])

 

             Mirtazapine 15 MG Oral Tablet [Remeron] Remeron      2020 12:

00:00 AM EST              15 

mg      oral                    completed                 Remeron [employer] ([facility])

 

                    24 HR venlafaxine 37.5 MG Extended Release Oral Capsule [Eff

exor] Effexor XR          

2020 12:00:00 AM EST        37.5 mg oral                 completed        

       Effexor XR [employer]

 ([facility])

 

                    24 HR venlafaxine 37.5 MG Extended Release Oral Capsule [Eff

exor] Effexor XR          

2020 12:00:00 AM EST        37.5 mg oral                 completed        

       Effexor XR [employer]

 ([facility])

 

             Mirtazapine 15 MG Oral Tablet [Remeron] Remeron      2020 12:

00:00 AM EST              15 

mg      oral                    completed                 Remeron [employer] ([facility])

 

                    24 HR venlafaxine 150 MG Extended Release Oral Capsule [Effe

xor] Effexor XR          

2020 12:00:00 AM EST        150 mg oral                 completed         

      Effexor XR [employer] 

([facility])

 

                    24 HR venlafaxine 37.5 MG Extended Release Oral Capsule [Eff

exor] Effexor XR          

2020 12:00:00 AM EST        37.5 mg oral                 completed        

       Effexor XR [employer]

 ([facility])

 

             Mirtazapine 15 MG Oral Tablet [Remeron] Remeron      2020 12:

00:00 AM EST              15 

mg      oral                    completed                 Remeron [employer] ([facility])

 

                    24 HR venlafaxine 150 MG Extended Release Oral Capsule [Effe

xor] Effexor XR          

2020 12:00:00 AM EST        150 mg oral                 completed         

      Effexor XR [employer] 

([facility])

 

             Mirtazapine 15 MG Oral Tablet [Remeron] Remeron      2020 12:

00:00 AM EST              15 

mg      oral                    completed                 Remeron [employer] ([facility])

 

                    24 HR venlafaxine 150 MG Extended Release Oral Capsule [Effe

xor] Effexor XR          

2020 12:00:00 AM EST        150 mg oral                 completed         

      Effexor XR [employer] 

([facility])

 

             Mirtazapine 15 MG Oral Tablet [Remeron] Remeron      2020 12:

00:00 AM EST              15 

mg      oral                    completed                 Remeron [employer] ([facility])

 

                    24 HR venlafaxine 37.5 MG Extended Release Oral Capsule [Eff

exor] Effexor XR          

2020 12:00:00 AM EST        37.5 mg oral                 completed        

       Effexor XR [employer]

 ([facility])

 

             Mirtazapine 15 MG Oral Tablet [Remeron] Remeron      2020 12:

00:00 AM EST              15 

mg      oral                    completed                 Remeron [employer] ([facility])

 

                    24 HR venlafaxine 37.5 MG Extended Release Oral Capsule [Eff

exor] Effexor XR          

2020 12:00:00 AM EST        37.5 mg oral                 completed        

       Effexor XR [employer]

 ([facility])

 

                    Hydroxyzine Hydrochloride 25 MG Oral Tablet hydroxyzine HCl 

    2020 12:00:00 

AM EST        25 mg  oral                 completed               hydroxyzine HC

l [employer] ([facility])

 

                    Hydroxyzine Hydrochloride 25 MG Oral Tablet hydroxyzine HCl 

    2020 12:00:00 

AM EST        25 mg  oral                 completed               hydroxyzine HC

l [employer] ([facility])

 

                    Hydroxyzine Hydrochloride 25 MG Oral Tablet hydroxyzine HCl 

    2020 12:00:00 

AM EST        25 mg  oral                 completed               [facility]ine HC

[facility]

 

                    Hydroxyzine Hydrochloride 25 MG Oral Tablet hydroxyzine HCl 

    2020 12:00:00 

AM EST        25 mg  oral                 completed               hydroxyzine HC

[facility]

 

                    Hydroxyzine Hydrochloride 25 MG Oral Tablet hydroxyzine HCl 

    2020 12:00:00 

AM EST        25 mg  oral                 completed               hydroxyzine HC

[facility]

 

                    Hydroxyzine Hydrochloride 25 MG Oral Tablet hydroxyzine HCl 

    2020 12:00:00 

AM EST        25 mg  oral                 completed               hydroxyzine HC

l [facility] ([facility])

 

                    Hydroxyzine Hydrochloride 25 MG Oral Tablet hydroxyzine HCl 

    2020 12:00:00 

AM EST        25 mg  oral                 completed               hydroxyzine HC

l [facility] ([facility])

 

                    Hydroxyzine Hydrochloride 25 MG Oral Tablet hydroxyzine HCl 

    2020 12:00:00 

AM EST        25 mg  oral                 completed               hydroxyzine HC

l [facility] ([facility])

 

                    Hydroxyzine Hydrochloride 25 MG Oral Tablet hydroxyzine HCl 

    2020 12:00:00 

AM EST        25 mg  oral                 completed               hydroxyzine HC

l [facility] ([facility])

 

                    Hydroxyzine Hydrochloride 25 MG Oral Tablet hydroxyzine HCl 

    2020 12:00:00 

AM EST        25 mg  oral                 completed               hydroxyzine HC

l [facility] ([facility])

 

                    24 HR venlafaxine 37.5 MG Extended Release Oral Capsule [Eff

exor] Effexor XR          

10/27/2020 12:00:00 AM EDT        37.5 mg oral                 completed        

       Effexor XR [employer]

 ([facility])

 

                    24 HR venlafaxine 150 MG Extended Release Oral Capsule [Effe

xor] Effexor XR          

10/27/2020 12:00:00 AM EDT        150 mg oral                 completed         

      Effexor XR [employer] 

([facility])

 

                    24 HR venlafaxine 150 MG Extended Release Oral Capsule [Effe

xor] Effexor XR          

10/27/2020 12:00:00 AM EDT        150 mg oral                 completed         

      Effexor XR [employer] 

([facility])

 

                    24 HR venlafaxine 37.5 MG Extended Release Oral Capsule [Eff

exor] Effexor XR          

10/27/2020 12:00:00 AM EDT        37.5 mg oral                 completed        

       Effexor XR [employer]

 ([facility])

 

                    24 HR venlafaxine 150 MG Extended Release Oral Capsule [Effe

xor] Effexor XR          

10/27/2020 12:00:00 AM EDT        150 mg oral                 completed         

      Effexor XR [employer] 

([facility])

 

                    24 HR venlafaxine 37.5 MG Extended Release Oral Capsule [Eff

exor] Effexor XR          

10/27/2020 12:00:00 AM EDT        37.5 mg oral                 completed        

       Effexor XR [employer]

 ([facility])

 

                    24 HR venlafaxine 37.5 MG Extended Release Oral Capsule [Eff

exor] Effexor XR          

10/27/2020 12:00:00 AM EDT        37.5 mg oral                 completed        

       Effexor XR [employer]

 ([facility])

 

                    24 HR venlafaxine 150 MG Extended Release Oral Capsule [Effe

xor] Effexor XR          

10/27/2020 12:00:00 AM EDT        150 mg oral                 completed         

      Effexor XR [employer] 

([facility])

 

                    24 HR venlafaxine 37.5 MG Extended Release Oral Capsule [Eff

exor] Effexor XR          

10/27/2020 12:00:00 AM EDT        37.5 mg oral                 completed        

       Effexor XR [employer]

 ([facility])

 

                    24 HR venlafaxine 150 MG Extended Release Oral Capsule [Effe

xor] Effexor XR          

10/27/2020 12:00:00 AM EDT        150 mg oral                 completed         

      Effexor XR [employer] 

([facility])

 

                    24 HR venlafaxine 37.5 MG Extended Release Oral Capsule [Eff

exor] Effexor XR          

10/27/2020 12:00:00 AM EDT        37.5 mg oral                 completed        

       Effexor XR [employer]

 ([facility])

 

                    24 HR venlafaxine 37.5 MG Extended Release Oral Capsule [Eff

exor] Effexor XR          

10/27/2020 12:00:00 AM EDT        37.5 mg oral                 completed        

       Effexor XR [employer]

 ([facility])

 

                    24 HR venlafaxine 150 MG Extended Release Oral Capsule [Effe

xor] Effexor XR          

10/27/2020 12:00:00 AM EDT        150 mg oral                 completed         

      Effexor XR [employer] 

([facility])

 

                    24 HR venlafaxine 150 MG Extended Release Oral Capsule [Effe

xor] Effexor XR          

10/27/2020 12:00:00 AM EDT        150 mg oral                 completed         

      Effexor XR [employer] 

([facility])

 

                    24 HR venlafaxine 37.5 MG Extended Release Oral Capsule [Eff

exor] Effexor XR          

10/27/2020 12:00:00 AM EDT        37.5 mg oral                 completed        

       [facility]ffexor XR [employer]

 ([facility])

 

                    24 HR venlafaxine 150 MG Extended Release Oral Capsule [Effe

xor] Effexor XR          

10/27/2020 12:00:00 AM EDT        150 mg oral                 completed         

      Effexor XR [employer] 

([facility])

 

                    24 HR venlafaxine 37.5 MG Extended Release Oral Capsule [Eff

exor] Effexor XR          

10/27/2020 12:00:00 AM EDT        37.5 mg oral                 completed        

       Effexor XR [employer]

 ([facility])

 

                    24 HR venlafaxine 37.5 MG Extended Release Oral Capsule [Eff

exor] Effexor XR          

10/27/2020 12:00:00 AM EDT        37.5 mg oral                 completed        

       [facility]ffexor XR [employer]

 ([facility])

 

                    24 HR venlafaxine 150 MG Extended Release Oral Capsule [Effe

xor] Effexor XR          

10/27/2020 12:00:00 AM EDT        150 mg oral                 completed         

      Effexor XR [employer] 

([facility])

 

                    24 HR venlafaxine 150 MG Extended Release Oral Capsule [Effe

xor] Effexor XR          

10/27/2020 12:00:00 AM EDT        150 mg oral                 completed         

      Effexor XR [facility] 

([facility])

 

                Trazodone Hydrochloride 50 MG Oral Tablet trazodone       2020 12:00:00 AM EDT  

        50 mg   oral                    completed                 trazodone [facility] ([facility] 

[facility])

 

                Trazodone Hydrochloride 50 MG Oral Tablet trazodone       2020 12:00:00 AM EDT  

        50 mg   oral                    completed                 t[facility]odone [facility]

[facility] ([facility])

 

                Trazodone Hydrochloride 50 MG Oral Tablet trazodone       2020 12:00:00 AM EDT  

        50 mg   oral                    completed                 trazodone [facility]

[facility] ([facility])

 

                Trazodone Hydrochloride 50 MG Oral Tablet trazodone       2020 12:00:00 AM EDT  

        50 mg   oral                    completed                 trazodone [facility]

[facility] ([facility])

 

                Trazodone Hydrochloride 50 MG Oral Tablet trazodone       2020 12:00:00 AM EDT  

        50 mg   oral                    completed                 trazodone [facility]

[facility] ([facility] 

[facility])

 

                Trazodone Hydrochloride 50 MG Oral Tablet trazodone       2020 12:00:00 AM EDT  

        50 mg   oral                    completed                 trazodone [facility]

[facility] ([facility] 

[facility])

 

                Trazodone Hydrochloride 50 MG Oral Tablet trazodone       2020 12:00:00 AM EDT  

        50 mg   oral                    completed                 trazodone [facility]

[facility] ([facility] 

[facility])

 

                Trazodone Hydrochloride 50 MG Oral Tablet trazodone       2020 12:00:00 AM EDT  

        50 mg   oral                    completed                 trazodone [facility]

[facility] ([facility] 

[facility])

 

                Trazodone Hydrochloride 50 MG Oral Tablet trazodone       2020 12:00:00 AM EDT  

        50 mg   oral                    completed                 trazodone [facility]

[facility] ([facility] 

[facility])

 

                Trazodone Hydrochloride 50 MG Oral Tablet trazodone       2020 12:00:00 AM EDT  

        50 mg   oral                    completed                 trazodone [facility]

[facility] ([facility])

 

                    24 HR venlafaxine 150 MG Extended Release Oral Capsule venla

faxine         2020 

12:00:00 AM EDT        150 mg oral                 completed               venla

faxine [employer] ([facility])

 

                    24 HR venlafaxine 150 MG Extended Release Oral Capsule venla

faxine         2020 

12:00:00 AM EDT        150 mg oral                 completed               venla

faxine [employer] ([facility])

 

                    24 HR venlafaxine 37.5 MG Extended Release Oral Capsule venl

afaxine         2020 

12:00:00 AM EDT        37.5 mg oral                 completed               venl

afaxine [employer] ([facility])

 

                    24 HR venlafaxine 37.5 MG Extended Release Oral Capsule venl

afaxine         2020 

12:00:00 AM EDT        37.5 mg oral                 completed               venl

afaxine [employer] ([facility])

 

                    24 HR venlafaxine 150 MG Extended Release Oral Capsule venla

faxine         2020 

12:00:00 AM EDT        150 mg oral                 completed               venla

faxine [employer] ([facility])

 

                    24 HR venlafaxine 37.5 MG Extended Release Oral Capsule venl

afaxine         2020 

12:00:00 AM EDT        37.5 mg oral                 completed               venl

afaxine [employer] ([facility])

 

                    24 HR venlafaxine 37.5 MG Extended Release Oral Capsule venl

afaxine         2020 

12:00:00 AM EDT        37.5 mg oral                 completed               venl

afaxine [employer] ([facility])

 

                    24 HR venlafaxine 37.5 MG Extended Release Oral Capsule venl

afaxine         2020 

12:00:00 AM EDT        37.5 mg oral                 completed               venl

afaxine [employer] ([facility])

 

                    24 HR venlafaxine 150 MG Extended Release Oral Capsule venla

faxine         2020 

12:00:00 AM EDT        150 mg oral                 completed               venla

faxine [employer] ([facility])

 

                    24 HR venlafaxine 37.5 MG Extended Release Oral Capsule venl

afaxine         2020 

12:00:00 AM EDT        37.5 mg oral                 completed               venl

afaxine [employer] ([facility])

 

                    24 HR venlafaxine 150 MG Extended Release Oral Capsule venla

faxine         2020 

12:00:00 AM EDT        150 mg oral                 completed               venla

faxine [employer] ([facility])

 

                    24 HR venlafaxine 37.5 MG Extended Release Oral Capsule venl

afaxine         2020 

12:00:00 AM EDT        37.5 mg oral                 completed               venl

afaxine [facility] ([facility]

 

                    24 HR venlafaxine 150 MG Extended Release Oral Capsule venla

faxine         2020 

12:00:00 AM EDT        150 mg oral                 completed               venla

faxine [employer] ([facility])

 

                    24 HR venlafaxine 37.5 MG Extended Release Oral Capsule venl

afaxine         2020 

12:00:00 AM EDT        37.5 mg oral                 completed               venl

afaxine [employer] ([facility])

 

                    24 HR venlafaxine 37.5 MG Extended Release Oral Capsule venl

afaxine         2020 

12:00:00 AM EDT        37.5 mg oral                 completed               venl

afaxine [employer] ([facility])

 

                    24 HR venlafaxine 37.5 MG Extended Release Oral Capsule venl

afaxine         2020 

12:00:00 AM EDT        37.5 mg oral                 completed               venl

afaxine [facility] ([facility])

 

                    24 HR venlafaxine 150 MG Extended Release Oral Capsule venla

faxine         2020 

12:00:00 AM EDT        150 mg oral                 completed               venla

faxine [facility] ([facility])

 

                    24 HR venlafaxine 150 MG Extended Release Oral Capsule venla

faxine         2020 

12:00:00 AM EDT        150 mg oral                 completed               venla

faxine [employer] ([facility])

 

                    24 HR venlafaxine 150 MG Extended Release Oral Capsule venla

faxine         2020 

12:00:00 AM EDT        150 mg oral                 completed               venla

faxine [facility] ([facility])

 

                    24 HR venlafaxine 150 MG Extended Release Oral Capsule venla

faxine         2020 

12:00:00 AM EDT        150 mg oral                 completed               venla

faxine [facility] ([facility])

 

                    24 HR venlafaxine 150 MG Extended Release Oral Capsule venla

faxine         2020 

12:00:00 AM EDT        150 mg oral                 completed               venla

faxine [employer] ([facility])

 

                    24 HR venlafaxine 150 MG Extended Release Oral Capsule venla

faxine         2020 

12:00:00 AM EDT        150 mg oral                 completed               venla

faxine [employer] ([facility])

 

                    24 HR venlafaxine 37.5 MG Extended Release Oral Capsule venl

afaxine         2020 

12:00:00 AM EDT        37.5 mg oral                 completed               venl

afaxine [facility] ([facility])

 

                    24 HR venlafaxine 37.5 MG Extended Release Oral Capsule venl

afaxine         2020 

12:00:00 AM EDT        37.5 mg oral                 completed               venl

afaxine [facility] ([facility])

 

                    24 HR venlafaxine 37.5 MG Extended Release Oral Capsule venl

afaxine         2020 

12:00:00 AM EDT        37.5 mg oral                 completed               venl

afaxine [facility] ([facility])

 

                    24 HR venlafaxine 150 MG Extended Release Oral Capsule venla

faxine         2020 

12:00:00 AM EDT        150 mg oral                 completed               venla

faxine [employer] ([facility])

 

                    24 HR venlafaxine 150 MG Extended Release Oral Capsule venla

faxine         2020 

12:00:00 AM EDT        150 mg oral                 completed               venla

faxine [employer] ([facility])

 

                    24 HR venlafaxine 150 MG Extended Release Oral Capsule venla

faxine         2020 

12:00:00 AM EDT        150 mg oral                 completed               venla

faxine [employer] ([facility])

 

                    24 HR venlafaxine 150 MG Extended Release Oral Capsule venla

faxine         2020 

12:00:00 AM EDT        150 mg oral                 completed               venla

faxine [employer] ([facility])

 

                    24 HR venlafaxine 37.5 MG Extended Release Oral Capsule venl

afaxine         2020 

12:00:00 AM EDT        37.5 mg oral                 completed               venl

afaxine [facility] ([facility])

 

                    24 HR venlafaxine 37.5 MG Extended Release Oral Capsule venl

afaxine         2020 

12:00:00 AM EDT        37.5 mg oral                 completed               venl

afaxine [employer] ([facility])

 

                    24 HR venlafaxine 37.5 MG Extended Release Oral Capsule venl

afaxine         2020 

12:00:00 AM EDT        37.5 mg oral                 completed               venl

afaxine [facility] ([facility])

 

                    24 HR venlafaxine 37.5 MG Extended Release Oral Capsule venl

afaxine         2020 

12:00:00 AM EDT        37.5 mg oral                 completed               venl

afaxine [facility] ([facility])

 

                    24 HR venlafaxine 37.5 MG Extended Release Oral Capsule venl

afaxine         2020 

12:00:00 AM EDT        37.5 mg oral                 completed               venl

afaxine [employer] ([facility])

 

                    24 HR venlafaxine 37.5 MG Extended Release Oral Capsule venl

afaxine         2020 

12:00:00 AM EDT        37.5 mg oral                 completed               venl

afaxine [facility] ([facility])

 

                    24 HR venlafaxine 37.5 MG Extended Release Oral Capsule venl

afaxine         2020 

12:00:00 AM EDT        37.5 mg oral                 completed               venl

afaxine [employer] ([facility])

 

                    24 HR venlafaxine 150 MG Extended Release Oral Capsule venla

faxine         2020 

12:00:00 AM EDT        150 mg oral                 completed               venla

faxine [facility] ([facility])

 

                    24 HR venlafaxine 150 MG Extended Release Oral Capsule venla

faxine         2020 

12:00:00 AM EDT        150 mg oral                 completed               venla

faxine [employer] ([facility])

 

                    24 HR venlafaxine 150 MG Extended Release Oral Capsule venla

faxine         2020 

12:00:00 AM EDT        150 mg oral                 completed               venla

faxine [employer] ([facility])

 

                    24 HR venlafaxine 150 MG Extended Release Oral Capsule venla

faxine         2020 

12:00:00 AM EDT        150 mg oral                 completed               venla

faxine [employer] ([facility])

 

                    Hydroxyzine Hydrochloride 25 MG Oral Tablet hydroxyzine HCl 

    2020 12:00:00 

AM EDT        25 mg  oral                 completed               hydroxyzine HC

l [facility] ([facility])

 

                    Hydroxyzine Hydrochloride 25 MG Oral Tablet hydroxyzine HCl 

    2020 12:00:00 

AM EDT        25 mg  oral                 completed               hydroxyzine HC

l [facility] ([facility])

 

                    Hydroxyzine Hydrochloride 25 MG Oral Tablet hydroxyzine HCl 

    2020 12:00:00 

AM EDT        25 mg  oral                 completed               hydroxyzine HC

l [facility] ([facility])

 

                    Hydroxyzine Hydrochloride 25 MG Oral Tablet hydroxyzine HCl 

    2020 12:00:00 

AM EDT        25 mg  oral                 completed               hydroxyzine HC

l [facility] ([facility])

 

                    Hydroxyzine Hydrochloride 25 MG Oral Tablet hydroxyzine HCl 

    2020 12:00:00 

AM EDT        25 mg  oral                 completed               hydroxyzine HC

l [employer] ([facility])

 

                    Hydroxyzine Hydrochloride 25 MG Oral Tablet hydroxyzine HCl 

    2020 12:00:00 

AM EDT        25 mg  oral                 completed               [facility]

 

                    Hydroxyzine Hydrochloride 25 MG Oral Tablet hydroxyzine HCl 

    2020 12:00:00 

AM EDT        25 mg  oral                 completed               hydroxyzine HC

[facility]

 

                    Hydroxyzine Hydrochloride 25 MG Oral Tablet hydroxyzine HCl 

    2020 12:00:00 

AM EDT        25 mg  oral                 completed               hydroxyzine HC

[facility]

 

                    Hydroxyzine Hydrochloride 25 MG Oral Tablet hydroxyzine HCl 

    2020 12:00:00 

AM EDT        25 mg  oral                 completed               hydroxyzine HC

[facility]

 

                    Hydroxyzine Hydrochloride 25 MG Oral Tablet hydroxyzine HCl 

    2020 12:00:00 

AM EDT        25 mg  oral                 completed               hydroxyzine HC

[facility]

 

                    24 HR Bupropion Hydrochloride 300 MG Extended Release Oral T

ablet bupropion HCl       

2020 12:00:00 AM EDT        300 mg oral                 completed         

      bupropion HCl 

[facility] ([facility])

 

                Trazodone Hydrochloride 50 MG Oral Tablet trazodone       2020 12:00:00 AM EDT  

        50 mg   oral                    completed                 trazodone Christina richard ([facility] 

[facility])

 

                    24 HR Bupropion Hydrochloride 300 MG Extended Release Oral T

ablet bupropion HCl       

2020 12:00:00 AM EDT        300 mg oral                 completed         

      bupropion HCl 

[facility] ([facility])

 

                Trazodone Hydrochloride 50 MG Oral Tablet trazodone       2020 12:00:00 AM EDT  

        50 mg   oral                    completed                 trazodone Christina richard ([facility] 

[facility])

 

                Trazodone Hydrochloride 50 MG Oral Tablet trazodone       2020 12:00:00 AM EDT  

        50 mg   oral                    completed                 trazodone [facility]

[facility]ven ([facility] 

[facility])

 

                    24 HR Bupropion Hydrochloride 300 MG Extended Release Oral T

ablet bupropion HCl       

2020 12:00:00 AM EDT        300 mg oral                 completed         

      bupropion HCl 

[employer] ([facility])

 

                Trazodone Hydrochloride 50 MG Oral Tablet trazodone       2020 12:00:00 AM EDT  

        50 mg   oral                    completed                 trazodone [facility]

[facility]ven ([facility] 

[facility])

 

                    24 HR Bupropion Hydrochloride 300 MG Extended Release Oral T

ablet bupropion HCl       

2020 12:00:00 AM EDT        300 mg oral                 completed         

      bupropion HCl 

[employer] ([facility])

 

                    24 HR Bupropion Hydrochloride 300 MG Extended Release Oral T

ablet bupropion HCl       

2020 12:00:00 AM EDT        300 mg oral                 completed         

      bupropion HCl 

[facility] ([facility])

 

                Trazodone Hydrochloride 50 MG Oral Tablet trazodone       2020 12:00:00 AM EDT  

        50 mg   oral                    completed                 trazodone Christina richrad ([facility] 

[facility])

 

                Trazodone Hydrochloride 50 MG Oral Tablet trazodone       2020 12:00:00 AM EDT  

        50 mg   oral                    completed                 trazodone Christina richard ([facility] 

[facility])

 

                    24 HR Bupropion Hydrochloride 300 MG Extended Release Oral T

ablet bupropion HCl       

2020 12:00:00 AM EDT        300 mg oral                 completed         

      bupropion HCl 

[facility] ([facility])

 

                Trazodone Hydrochloride 50 MG Oral Tablet trazodone       2020 12:00:00 AM EDT  

        50 mg   oral                    completed                 trazodone [facility]

[facility]ven ([facility] 

[facility])

 

                    24 HR Bupropion Hydrochloride 300 MG Extended Release Oral T

ablet bupropion HCl       

2020 12:00:00 AM EDT        300 mg oral                 completed         

      bupropion HCl 

[facility] ([facility])

 

                    24 HR Bupropion Hydrochloride 300 MG Extended Release Oral T

ablet bupropion HCl       

2020 12:00:00 AM EDT        300 mg oral                 completed         

      bupropion HCl 

[facility] ([facility])

 

                    24 HR Bupropion Hydrochloride 300 MG Extended Release Oral T

ablet bupropion HCl       

2020 12:00:00 AM EDT        300 mg oral                 completed         

      bupropion HCl 

[facility] ([facility])

 

                Trazodone Hydrochloride 50 MG Oral Tablet trazodone       2020 12:00:00 AM EDT  

        50 mg   oral                    completed                 trazodone [facility]

[facility]ven ([facility] 

[facility])

 

                Trazodone Hydrochloride 50 MG Oral Tablet trazodone       2020 12:00:00 AM EDT  

        50 mg   oral                    completed                 trazodone [facility]

leven ([facility] 

[facility])

 

                Trazodone Hydrochloride 50 MG Oral Tablet trazodone       2020 12:00:00 AM EDT  

        50 mg   oral                    completed                 trazodone [facility]

[facility]ven ([facility] 

[facility])

 

                    24 HR Bupropion Hydrochloride 300 MG Extended Release Oral T

ablet bupropion HCl       

2020 12:00:00 AM EDT        300 mg oral                 completed         

      bupropion HCl 

[facility] ([facility])

 

                buspirone hydrochloride 15 MG Oral Tablet buspirone       2019 12:00:00 AM EDT  

        15 mg   oral                    completed                 buspirone [facility]

[facility]ven ([facility] 

[facility])

 

                buspirone hydrochloride 15 MG Oral Tablet buspirone       2019 12:00:00 AM EDT  

        15 mg   oral                    completed                 buspirone [facility]

[facility]ven ([facility] 

[facility])

 

                buspirone hydrochloride 15 MG Oral Tablet buspirone       2019 12:00:00 AM EDT  

        15 mg   oral                    completed                 buspirone [facility]

leven ([facility] 

[facility])

 

                buspirone hydrochloride 15 MG Oral Tablet buspirone       2019 12:00:00 AM EDT  

        15 mg   oral                    completed                 buspirone [facility]

[facility]ven ([facility] 

[facility])

 

                buspirone hydrochloride 15 MG Oral Tablet buspirone       2019 12:00:00 AM EDT  

        15 mg   oral                    completed                 buspirone [facility]

[facility]ven ([facility])

 

                buspirone hydrochloride 15 MG Oral Tablet buspirone       2019 12:00:00 AM EDT  

        15 mg   oral                    completed                 buspirone [facility]

[facility]ven ([facility] 

[facility])

 

                buspirone hydrochloride 15 MG Oral Tablet buspirone       2019 12:00:00 AM EDT  

        15 mg   oral                    completed                 buspirone [facility]

[facility]ven ([facility] 

[facility])

 

                buspirone hydrochloride 15 MG Oral Tablet buspirone       2019 12:00:00 AM EDT  

        15 mg   oral                    completed                 buspirone [facility]

[facility]ven ([facility] 

[facility])

 

                buspirone hydrochloride 15 MG Oral Tablet buspirone       2019 12:00:00 AM EDT  

        15 mg   oral                    completed                 buspirone [facility]

[facility]ven ([facility] 

[facility])

 

                buspirone hydrochloride 15 MG Oral Tablet buspirone       2019 12:00:00 AM EDT  

        15 mg   oral                    completed                 buspirone [facility]

leven ([facility] 

[facility])



                                                                                
                                                                                
                                                                                
                                                                                
                                                                                
                                                                                
                                                                                
                                                                                
                                                                                
                                                                                
                                                                                
                                                                                
                                                                                
                                                                                
                                                                                
                                                                                
                                                                                
                                                                                
                                                                                
                                                                                
                                                                                
                                                                                
                                                                                
                                                                                
                                                                                
                                                                                
                                                                                
                                                                                
                                                                                
                                                                                
                                                                                
                                                                                
                                                                                
                                                                                
                                                                                
                                                          



Insurance Providers

          



             Payer name   Policy type / Coverage type Policy ID    Covered party

 ID Covered 

party's relationship to davis Policy [place]             Plan Information

 

          [facility] BECK PLAN           [id number]           SP            

      [id number]

 

          [employer]  I         [id number]           Self                [id number]

[id number]

 

           [employer] [id number]

867.0 Self       

                                        [id number]

 

          Managed [employer] P         [id number]           S                

   [id number]

 

           [employer] [id number]

867.0 Self       

                                        [id number]

 

           [employer] [id number]

867.0 Self       

                                        [id number]

 

                [employer]    Commercial      [id number]     

[id number] Self                              

      [id number]

 

           [employer] [id number]

867.0 Self       

                                        [id number]

 

          [employer]   I         [id number]           Self                [id number]

 

          [facility]           [id number]           SP             

     [id number]

 

          [facility] [employer]         [id number]           S      

             [id number]

 

                [employer] [employer] Commercial      [id number]     

[id number] Self                                    7

[id number]

 

                [employer] Commercial      [id number]     

[id number] Self                                    7

[id number]

 

                [employer] [employer] Commercial      [id number]     

[id number] Self                                    7

[id number]

 

          [employer] [place] O         [id number] S                   74

[id number]

 

          [facility] PLAN [facility]           [id number]           SP           

       [id number]

 

          MEDICAID            [id number]            SP                  [id number]

 

          [place]           [id number]            SP                  [id number]

[id number]

 

                              [id number]                               [id number]

[id number]

 

          [facility]           [id number]           SP             

     [id number]

 

          [employer]             [id number]           SP                  [id number]

[id number]

 

          [employer]              [id number]            SP                  [id number]

 

          [facility]           [id number]           SP             

     [id number]

 

          Medicaid  S         [id number]            S                   [id number]

 

          D [employer] Medicare Dental O         [id number]           S   

                [id number]

 

          [facility] [employer] P         [id number]           S      

             [id number]

 

          Medicare  O         [id number]           S                   [id number]

[id number]

 

                [employer] [facility] Commercial      [id number]       

[id number] Self                                    

[id number]

 

                [employer]      [id number]     

[id number] Self                                    [id number]

[id number]



                                                                                
                                                                                
                                                                                
                                                                                
                                                



Problems, Conditions, and Diagnoses

          



           Code       Display Name Description Problem Type Effective Dates Data

 Source(s)

 

             [id number]     Generalized anxiety disorder Generalized anxiety disor

dennise Condition    

2021 12:00:00 AM EST              [employer] ([facility]

ving 

Services)

 

             [id number]     Generalized anxiety disorder Generalized anxiety disor

dennise Condition    

2021 12:00:00 AM EST              [employer] ([facility]

ving 

Services)

 

             [id number]     Generalized anxiety disorder Generalized anxiety disor

dennise Condition    

2021 12:00:00 AM EST              [employer] ([facility] Li

ving 

Services)

 

             [id number]     Generalized anxiety disorder Generalized anxiety disor

dennise Condition    

2021 12:00:00 AM EST              [employer] ([facility]

ving 

Services)

 

             [id number]     Generalized anxiety disorder Generalized anxiety disor

dennise Condition    

2021 12:00:00 AM EST              [employer] ([facility] Li

ving 

Services)

 

             [id number]     Generalized anxiety disorder Generalized anxiety disor

dennise Condition    

2021 12:00:00 AM EST              [employer] ([facility] Li

ving 

Services)

 

             [id number]     Generalized anxiety disorder Generalized anxiety disor

dennise Condition    

2021 12:00:00 AM EST              [employer] ([facility] Li

ving 

Services)

 

             [id number]     Generalized anxiety disorder Generalized anxiety disor

dennise Condition    

2021 12:00:00 AM EST              [employer] ([facility] Li

ving 

Services)

 

             [id number]     Generalized anxiety disorder Generalized anxiety disor

dennise Condition    

2021 12:00:00 AM EST              [employer] ([facility] Li

ving 

Services)

 

             [id number]     Generalized anxiety disorder Generalized anxiety disor

dennise Condition    

2021 12:00:00 AM EST              [employer] ([facility] Li

ving 

Services)



                                                                                
                                                                                
                            



Surgeries/Procedures

          



             Procedure    Description  Date         Indications  Data Source(s)

 

             INJECTION SINGLE/MLT TRIGGER POINT 3/> MUSCLES              10/11/2

021 12:00:00 AM EDT              

[facility] ([facility] )

 

             Inj, Anesth Agent, Trigeminal              10/11/2021 12:00:00 AM E

DT              [facility] ([facility] )

 

             INJECTION ANES OTHER PERIPHERAL NERVE/BRANCH              10/11/202

1 12:00:00 AM EDT              

[facility] ([facility], )

 

             OFFICE OUTPATIENT VISIT 25 MINUTES              2021 12:00:00

 AM EDT              [facility] ([facility] )

 

             Individual psychotherapy (regime/therapy)              2021 1

2:00:00 AM EDT              

[employer] ([facility])

 

             Individual psychotherapy (regime/therapy)              2021 1

2:00:00 AM EDT              

[facility] ([facility])

 

             Individual psychotherapy (regime/therapy)              2021 1

2:00:00 AM EDT              

[facility] ([facility])

 

             Individual psychotherapy (regime/therapy)              2021 1

2:00:00 AM EDT              

[facility] ([facility])

 

             Individual psychotherapy (regime/therapy)              2021 1

2:00:00 AM EDT              

[facility] ([facility])

 

             Individual psychotherapy (regime/therapy)              2021 1

2:00:00 AM EDT              

[facility] ([facility])

 

                    Evaluation AND/OR management - established patient (procedur

e)                     2021 

12:00:00 AM EDT                                     [facility]

 

                    Evaluation AND/OR management - established patient (procedur

e)                     2021 

12:00:00 AM EDT                                     [facility]

 

                    Evaluation AND/OR management - established patient (procedur

e)                     2021 

12:00:00 AM EDT                                     [facility] ([facility])

 

             Individual psychotherapy (regime/therapy)              2021 1

2:00:00 AM EDT              

[facility] ([facility])

 

             Individual psychotherapy (regime/therapy)              2021 1

2:00:00 AM EDT              

[facility] ([facility])

 

             Individual psychotherapy (regime/therapy)              2021 1

2:00:00 AM EDT              

[facility])

 

             Individual psychotherapy (regime/therapy)              2021 1

2:00:00 AM EDT              

[facility] ([facility])

 

             Individual psychotherapy (regime/therapy)              2021 1

2:00:00 AM EDT              

[facility] ([facility])

 

             Individual psychotherapy (regime/therapy)              2021 1

2:00:00 AM EDT              

[facility] ([facility])

 

             Individual psychotherapy (regime/therapy)              2021 1

2:00:00 AM EDT              

[employer] ([facility])

 

             Individual psychotherapy (regime/therapy)              2021 1

2:00:00 AM EDT              

[facility] ([facility])

 

             INJECTION SINGLE/MLT TRIGGER POINT 3/> MUSCLES              

021 12:00:00 AM EDT              

[facility] ([facility], )

 

             Inj, Anesth Agent, Trigeminal              2021 12:00:00 AM E

DT              [facility] ([facility], )

 

             INJECTION ANES OTHER PERIPHERAL NERVE/BRANCH               12:00:00 AM EDT              

[facility] ([facility], )

 

             Individual psychotherapy (regime/therapy)              2021 1

2:00:00 AM EDT              

[facility])

 

             Individual psychotherapy (regime/therapy)              2021 1

2:00:00 AM EDT              

[facility] ([facility])

 

             Individual psychotherapy (regime/therapy)              2021 1

2:00:00 AM EDT              

[facility])

 

             Individual psychotherapy (regime/therapy)              2021 1

2:00:00 AM EDT              

[facility] ([facility])

 

             Individual psychotherapy (regime/therapy)              2021 1

2:00:00 AM EDT              

[facility] ([facility])

 

             Individual psychotherapy (regime/therapy)              2021 1

2:00:00 AM EDT              

[facility] ([facility])

 

             Individual psychotherapy (regime/therapy)              2021 1

2:00:00 AM EDT              

[facility])

 

             Individual psychotherapy (regime/therapy)              2021 1

2:00:00 AM EDT              

[facility] ([facility])

 

             Individual psychotherapy (regime/therapy)              2021 1

2:00:00 AM EDT              

[facility] ([facility])

 

             Individual psychotherapy (regime/therapy)              2021 1

2:00:00 AM EDT              

[facility] ([facility])

 

             Individual psychotherapy (regime/therapy)              2021 1

2:00:00 AM EDT              

[facility] ([facility])

 

             Individual psychotherapy (regime/therapy)              2021 1

2:00:00 AM EDT              

[employer] ([facility])

 

             Individual psychotherapy (regime/therapy)              2021 1

2:00:00 AM EDT              

[employer] ([facility])

 

             Individual psychotherapy (regime/therapy)              2021 1

2:00:00 AM EDT              

[employer] ([facility])

 

             Individual psychotherapy (regime/therapy)              2021 1

2:00:00 AM EDT              

[employer] ([facility])

 

             Individual psychotherapy (regime/therapy)              2021 1

2:00:00 AM EDT              

[employer] ([facility])

 

             Individual psychotherapy (regime/therapy)              2021 1

2:00:00 AM EDT              

[employer] ([facility])

 

             Individual psychotherapy (regime/therapy)              2021 1

2:00:00 AM EDT              

[employer] ([facility])

 

             Individual psychotherapy (regime/therapy)              2021 1

2:00:00 AM EDT              

[employer] ([facility])

 

             Individual psychotherapy (regime/therapy)              2021 1

2:00:00 AM EDT              

[employer] ([facility])

 

             Individual psychotherapy (regime/therapy)              2021 1

2:00:00 AM EDT              

[employer] ([facility])

 

             Individual psychotherapy (regime/therapy)              2021 1

2:00:00 AM EDT              

[employer] ([facility])

 

             Individual psychotherapy (regime/therapy)              2021 1

2:00:00 AM EDT              

[employer] ([facility])

 

             Individual psychotherapy (regime/therapy)              2021 1

2:00:00 AM EDT              

[employer] ([facility])

 

             Individual psychotherapy (regime/therapy)              2021 1

2:00:00 AM EDT              

[employer] ([facility])

 

             Individual psychotherapy (regime/therapy)              2021 1

2:00:00 AM EDT              

[employer] ([facility])

 

             Individual psychotherapy (regime/therapy)              2021 1

2:00:00 AM EDT              

[employer] ([facility])

 

             Individual psychotherapy (regime/therapy)              2021 1

2:00:00 AM EDT              

[facility] ([facility])

 

             Individual psychotherapy (regime/therapy)              2021 1

2:00:00 AM EDT              

[facility] ([facility])

 

             Individual psychotherapy (regime/therapy)              2021 1

2:00:00 AM EDT              

[facility] ([facility])

 

             Individual psychotherapy (regime/therapy)              2021 1

2:00:00 AM EDT              

[facility])

 

             Individual psychotherapy (regime/therapy)              2021 1

2:00:00 AM EDT              

[facility] ([facility])

 

             Individual psychotherapy (regime/therapy)              2021 1

2:00:00 AM EDT              

[facility])

 

             Individual psychotherapy (regime/therapy)              2021 1

2:00:00 AM EDT              

[facility])

 

             Individual psychotherapy (regime/therapy)              2021 1

2:00:00 AM EDT              

[facility] ([facility])

 

             Individual psychotherapy (regime/therapy)              2021 1

2:00:00 AM EDT              

[facility])

 

             OFFICE OUTPATIENT VISIT 25 MINUTES              2021 12:00:00

 AM EDT              [facility] ([facility] )

 

             INJECTION SINGLE/MLT TRIGGER POINT 3/> MUSCLES              

021 12:00:00 AM EDT              

[facility] ([facility] )

 

             Inj, Anesth Agent, Trigeminal              2021 12:00:00 AM E

DT              [facility] ([facility])

 

             INJECTION ANES OTHER PERIPHERAL NERVE/BRANCH               12:00:00 AM EDT              

[facility] ([facility] )

 

             Individual psychotherapy (regime/therapy)              2021 1

2:00:00 AM EDT              

[facility])

 

             Individual psychotherapy (regime/therapy)              2021 1

2:00:00 AM EDT              

[facility])

 

             Individual psychotherapy (regime/therapy)              2021 1

2:00:00 AM EDT              

[facility]

 

             Individual psychotherapy (regime/therapy)              2021 1

2:00:00 AM EDT              

[employer] ([facility])

 

             Individual psychotherapy (regime/therapy)              2021 1

2:00:00 AM EDT              

[employer] ([facility])

 

             Individual psychotherapy (regime/therapy)              2021 1

2:00:00 AM EDT              

[facility]

 

             Individual psychotherapy (regime/therapy)              2021 1

2:00:00 AM EDT              

[facility])

 

             Individual psychotherapy (regime/therapy)              2021 1

2:00:00 AM EDT              

[facility])

 

             Individual psychotherapy (regime/therapy)              2021 1

2:00:00 AM EDT              

[facility])

 

             Individual psychotherapy (regime/therapy)              2021 1

2:00:00 AM EDT              

[facility] ([facility])

 

             Individual psychotherapy (regime/therapy)              2021 1

2:00:00 AM EDT              

[facility] ([facility])

 

             Individual psychotherapy (regime/therapy)              2021 1

2:00:00 AM EDT              

[facility]

 

             Individual psychotherapy (regime/therapy)              2021 1

2:00:00 AM EDT              

[facility] ([facility])

 

             Individual psychotherapy (regime/therapy)              2021 1

2:00:00 AM EDT              

[facility]

 

             Individual psychotherapy (regime/therapy)              2021 1

2:00:00 AM EDT              

[facility])

 

             Individual psychotherapy (regime/therapy)              2021 1

2:00:00 AM EDT              

[facility])

 

             Individual psychotherapy (regime/therapy)              2021 1

2:00:00 AM EDT              

[facility]

 

             Individual psychotherapy (regime/therapy)              2021 1

2:00:00 AM EDT              

[facility] ([facility])

 

             Individual psychotherapy (regime/therapy)              2021 1

2:00:00 AM EDT              

[facility])

 

             Individual psychotherapy (regime/therapy)              2021 1

2:00:00 AM EDT              

[facility])

 

             Individual psychotherapy (regime/therapy)              2021 1

2:00:00 AM EDT              

[facility] ([facility])

 

             Individual psychotherapy (regime/therapy)              2021 1

2:00:00 AM EDT              

[facility])

 

             Individual psychotherapy (regime/therapy)              2021 1

2:00:00 AM EDT              

[facility] ([facility])

 

             Individual psychotherapy (regime/therapy)              2021 1

2:00:00 AM EDT              

[facility] ([facility])

 

             Individual psychotherapy (regime/therapy)              2021 1

2:00:00 AM EDT              

[facility] ([facility])

 

             Individual psychotherapy (regime/therapy)              2021 1

2:00:00 AM EDT              

[facility] ([facility])

 

             Individual psychotherapy (regime/therapy)              2021 1

2:00:00 AM EDT              

[facility]

 

             Individual psychotherapy (regime/therapy)              2021 1

2:00:00 AM EDT              

[facility] ([facility])

 

             Individual psychotherapy (regime/therapy)              2021 1

2:00:00 AM EDT              

[facility] ([facility])

 

             Individual psychotherapy (regime/therapy)              2021 1

2:00:00 AM EDT              

[facility] ([facility])

 

                    Evaluation AND/OR management - established patient (procedur

e)                     2021 

12:00:00 AM EDT                                     [facility]

[facility])

 

                    Evaluation AND/OR management - established patient (procedur

e)                     2021 

12:00:00 AM EDT                                     [facility] ([facility]

[facility])

 

                    Evaluation AND/OR management - established patient (procedur

e)                     2021 

12:00:00 AM EDT                                     [facility]

[facility])

 

                    Evaluation AND/OR management - established patient (procedur

e)                     2021 

12:00:00 AM EDT                                     [employer] ([facility]

[facility])

 

                    Evaluation AND/OR management - established patient (procedur

e)                     2021 

12:00:00 AM EDT                                     [employer] ([facility]

[facility])

 

                    Evaluation AND/OR management - established patient (procedur

e)                     2021 

12:00:00 AM EDT                                     [employer] ([facility]

[facility] Services)

 

                    Evaluation AND/OR management - established patient (procedur

e)                     2021 

12:00:00 AM EDT                                     [employer] ([facility]

ns[facility])

 

                    Evaluation AND/OR management - established patient (procedur

e)                     2021 

12:00:00 AM EDT                                     [employer] ([facility]

ns[facility] Living Services)

 

             Individual psychotherapy (regime/therapy)              2021 1

2:00:00 AM EDT              

[facility])

 

             Individual psychotherapy (regime/therapy)              2021 1

2:00:00 AM EDT              

[facility]

 

             Individual psychotherapy (regime/therapy)              2021 1

2:00:00 AM EDT              

[facility]

 

             Individual psychotherapy (regime/therapy)              2021 1

2:00:00 AM EDT              

[facility] ([facility])

 

             Individual psychotherapy (regime/therapy)              2021 1

2:00:00 AM EDT              

[facility]

 

             Individual psychotherapy (regime/therapy)              2021 1

2:00:00 AM EDT              

[facility]

 

             Individual psychotherapy (regime/therapy)              2021 1

2:00:00 AM EDT              

[employer] ([facility])

 

             Individual psychotherapy (regime/therapy)              2021 1

2:00:00 AM EDT              

[employer] ([facility])

 

             Individual psychotherapy (regime/therapy)              2021 1

2:00:00 AM EDT              

[facility] Living [facility])

 

             Individual psychotherapy (regime/therapy)              2021 1

2:00:00 AM EDT              

[employer] ([facility])

 

             Individual psychotherapy (regime/therapy)              2021 1

2:00:00 AM EDT              

[employer] ([facility])

 

             Individual psychotherapy (regime/therapy)              2021 1

2:00:00 AM EDT              

[facility])

 

             Individual psychotherapy (regime/therapy)              2021 1

2:00:00 AM EDT              

[employer] ([facility])

 

             Individual psychotherapy (regime/therapy)              2021 1

2:00:00 AM EDT              

[facility])

 

             Individual psychotherapy (regime/therapy)              2021 1

2:00:00 AM EDT              

[employer] ([facility])

 

             Individual psychotherapy (regime/therapy)              2021 1

2:00:00 AM EDT              

[facility] ([facility])

 

             Individual psychotherapy (regime/therapy)              2021 1

2:00:00 AM EDT              

[facility] ([facility])

 

             Individual psychotherapy (regime/therapy)              2021 1

2:00:00 AM EDT              

[facility])

 

             Individual psychotherapy (regime/therapy)              2021 1

2:00:00 AM EDT              

[facility])

 

             Individual psychotherapy (regime/therapy)              2021 1

2:00:00 AM EDT              

[facility] ([facility])

 

             Individual psychotherapy (regime/therapy)              2021 1

2:00:00 AM EDT              

[facility])

 

             Individual psychotherapy (regime/therapy)              2021 1

2:00:00 AM EDT              

[facility])

 

             Individual psychotherapy (regime/therapy)              2021 1

2:00:00 AM EDT              

[facility])

 

             Individual psychotherapy (regime/therapy)              2021 1

2:00:00 AM EDT              

[facility])

 

             Individual psychotherapy (regime/therapy)              2021 1

2:00:00 AM EDT              

[facility] ([facility])

 

             Individual psychotherapy (regime/therapy)              2021 1

2:00:00 AM EDT              

[facility]

 

             Individual psychotherapy (regime/therapy)              2021 1

2:00:00 AM EDT              

[employer] ([facility])

 

             Individual psychotherapy (regime/therapy)              2021 1

2:00:00 AM EDT              

[employer] ([facility])

 

             Individual psychotherapy (regime/therapy)              2021 1

2:00:00 AM EDT              

[employer] ([facility])

 

             Individual psychotherapy (regime/therapy)              2021 1

2:00:00 AM EDT              

[employer] ([facility])

 

             Individual psychotherapy (regime/therapy)              2021 1

2:00:00 AM EDT              

[facility])

 

             Individual psychotherapy (regime/therapy)              2021 1

2:00:00 AM EDT              

[facility] ([facility])

 

             Individual psychotherapy (regime/therapy)              2021 1

2:00:00 AM EDT              

[facility] ([facility])

 

             Individual psychotherapy (regime/therapy)              2021 1

2:00:00 AM EDT              

[facility] ([facility])

 

             Individual psychotherapy (regime/therapy)              2021 1

2:00:00 AM EDT              

[facility])

 

             Individual psychotherapy (regime/therapy)              2021 1

2:00:00 AM EDT              

[facility] ([facility])

 

             Individual psychotherapy (regime/therapy)              2021 1

2:00:00 AM EDT              

[facility] ([facility])

 

                    Evaluation AND/OR management - established patient (procedur

e)                     2021 

12:00:00 AM EDT                                     [employer] ([facility]

nsitional Living Services)

 

             Individual psychotherapy (regime/therapy)              2021 1

2:00:00 AM EDT              

[facility]

 

             Individual psychotherapy (regime/therapy)              2021 1

2:00:00 AM EDT              

[facility] ([facility])

 

                    Evaluation AND/OR management - established patient (procedur

e)                     2021 

12:00:00 AM EDT                                     [facility] ([facility])

 

             Individual psychotherapy (regime/therapy)              2021 1

2:00:00 AM EDT              

[employer] ([facility])

 

             Individual psychotherapy (regime/therapy)              2021 1

2:00:00 AM EDT              

[employer] ([facility])

 

                    Evaluation AND/OR management - established patient (procedur

e)                     2021 

12:00:00 AM EDT                                     [facility]

 

             Individual psychotherapy (regime/therapy)              2021 1

2:00:00 AM EDT              

[employer] ([facility])

 

             Individual psychotherapy (regime/therapy)              2021 1

2:00:00 AM EDT              

[facility] ([facility])

 

                    Evaluation AND/OR management - established patient (procedur

e)                     2021 

12:00:00 AM EDT                                     [facility] ([facility])

 

             Individual psychotherapy (regime/therapy)              2021 1

2:00:00 AM EDT              

[facility] ([facility])

 

             Individual psychotherapy (regime/therapy)              2021 1

2:00:00 AM EDT              

[facility] ([facility])

 

                    Evaluation AND/OR management - established patient (procedur

e)                     2021 

12:00:00 AM EDT                                     [facility] ([facility])

 

             Individual psychotherapy (regime/therapy)              2021 1

2:00:00 AM EDT              

[facility]

 

             Individual psychotherapy (regime/therapy)              2021 1

2:00:00 AM EDT              

[facility])

 

                    Evaluation AND/OR management - established patient (procedur

e)                     2021 

12:00:00 AM EDT                                     [facility] ([facility])

 

             Individual psychotherapy (regime/therapy)              2021 1

2:00:00 AM EDT              

[facility])

 

             Individual psychotherapy (regime/therapy)              2021 1

2:00:00 AM EDT              

[facility] ([facility])

 

                    Evaluation AND/OR management - established patient (procedur

e)                     2021 

12:00:00 AM EDT                                     [facility] [facility]

 

             Individual psychotherapy (regime/therapy)              2021 1

2:00:00 AM EDT              

[employer] ([facility])

 

             Individual psychotherapy (regime/therapy)              2021 1

2:00:00 AM EDT              

[employer] ([facility])

 

                    Evaluation AND/OR management - established patient (procedur

e)                     2021 

12:00:00 AM EDT                                     [facility]

 

             Individual psychotherapy (regime/therapy)              2021 1

2:00:00 AM EDT              

[employer] ([facility])

 

             Individual psychotherapy (regime/therapy)              2021 1

2:00:00 AM EDT              

[employer] ([facility])

 

                    Evaluation AND/OR management - established patient (procedur

e)                     2021 

12:00:00 AM EDT                                     [employer] ([facility])

 

             Individual psychotherapy (regime/therapy)              2021 1

2:00:00 AM EDT              

[employer] ([facility])

 

             Individual psychotherapy (regime/therapy)              2021 1

2:00:00 AM EDT              

[employer] ([facility])

 

                    Evaluation AND/OR management - established patient (procedur

e)                     2021 

12:00:00 AM EDT                                     [employer] ([facility])

 

             Individual psychotherapy (regime/therapy)              2021 1

2:00:00 AM EDT              

[employer] ([facility])

 

             Individual psychotherapy (regime/therapy)              2021 1

2:00:00 AM EDT              

[employer] ([facility])

 

             Individual psychotherapy (regime/therapy)              2021 1

2:00:00 AM EDT              

[employer] ([facility])

 

             Individual psychotherapy (regime/therapy)              2021 1

2:00:00 AM EDT              

[employer] ([facility])

 

             Individual psychotherapy (regime/therapy)              2021 1

2:00:00 AM EDT              

[employer] ([facility])

 

             Individual psychotherapy (regime/therapy)              2021 1

2:00:00 AM EDT              

[employer] ([facility])

 

             Individual psychotherapy (regime/therapy)              2021 1

2:00:00 AM EDT              

[employer] ([facility])

 

             Individual psychotherapy (regime/therapy)              2021 1

2:00:00 AM EDT              

[employer] ([facility])

 

             Individual psychotherapy (regime/therapy)              2021 1

2:00:00 AM EDT              

[employer] ([facility])

 

             Individual psychotherapy (regime/therapy)              2021 1

2:00:00 AM EDT              

[employer] ([facility])

 

             Individual psychotherapy (regime/therapy)              2021 1

2:00:00 AM EDT              

[employer] ([facility])

 

             Individual psychotherapy (regime/therapy)              2021 1

2:00:00 AM EDT              

[employer] ([facility])

 

             Individual psychotherapy (regime/therapy)              2021 1

2:00:00 AM EDT              

[employer] ([facility])

 

             Individual psychotherapy (regime/therapy)              2021 1

2:00:00 AM EDT              

[employer] ([facility])

 

             Individual psychotherapy (regime/therapy)              2021 1

2:00:00 AM EDT              

[employer] ([facility])

 

             Individual psychotherapy (regime/therapy)              2021 1

2:00:00 AM EDT              

[employer] ([facility])

 

             Individual psychotherapy (regime/therapy)              2021 1

2:00:00 AM EDT              

[employer] ([facility])

 

             Individual psychotherapy (regime/therapy)              2021 1

2:00:00 AM EDT              

[employer] ([facility])

 

             Individual psychotherapy (regime/therapy)              2021 1

2:00:00 AM EDT              

[employer] ([facility])

 

             Individual psychotherapy (regime/therapy)              2021 1

2:00:00 AM EDT              

[employer] ([facility])

 

             Individual psychotherapy (regime/therapy)              2021 1

2:00:00 AM EDT              

[employer] ([facility])

 

             INJECTION SINGLE/MLT TRIGGER POINT 3/> MUSCLES              

021 12:00:00 AM EDT              

[facility] ([facility] )

 

             Inj, Anesth Agent, Trigeminal              2021 12:00:00 AM E

DT              [facility] ([facility] )

 

             INJECTION ANES OTHER PERIPHERAL NERVE/BRANCH               12:00:00 AM EDT              

[facility] ([facility] )

 

             Individual psychotherapy (regime/therapy)              2021 1

2:00:00 AM EDT              

[facility] ([facility])

 

             Individual psychotherapy (regime/therapy)              2021 1

2:00:00 AM EDT              

[facility] ([facility])

 

             Individual psychotherapy (regime/therapy)              2021 1

2:00:00 AM EDT              

[facility] ([facility])

 

             Individual psychotherapy (regime/therapy)              2021 1

2:00:00 AM EDT              

[facility] ([facility])

 

             Individual psychotherapy (regime/therapy)              2021 1

2:00:00 AM EDT              

[facility] ([facility])

 

             Individual psychotherapy (regime/therapy)              2021 1

2:00:00 AM EDT              

[facility] ([facility])

 

             Individual psychotherapy (regime/therapy)              2021 1

2:00:00 AM EDT              

[facility] ([facility])

 

             Individual psychotherapy (regime/therapy)              2021 1

2:00:00 AM EDT              

[facility] ([facility])

 

             Individual psychotherapy (regime/therapy)              2021 1

2:00:00 AM EDT              

[facility] ([facility])

 

             Individual psychotherapy (regime/therapy)              2021 1

2:00:00 AM EDT              

[facility] ([facility])

 

             Individual psychotherapy (regime/therapy)              2021 1

2:00:00 AM EDT              

[facility] ([facility])

 

             Individual psychotherapy (regime/therapy)              2021 1

2:00:00 AM EDT              

[facility] ([facility])

 

             Individual psychotherapy (regime/therapy)              2021 1

2:00:00 AM EDT              

[facility] ([facility])

 

             Individual psychotherapy (regime/therapy)              2021 1

2:00:00 AM EDT              

[facility] ([facility])

 

             Individual psychotherapy (regime/therapy)              2021 1

2:00:00 AM EDT              

[facility] ([facility])

 

             Individual psychotherapy (regime/therapy)              2021 1

2:00:00 AM EDT              

[facility] ([facility])

 

             Individual psychotherapy (regime/therapy)              2021 1

2:00:00 AM EDT              

[facility] ([facility])

 

             Individual psychotherapy (regime/therapy)              2021 1

2:00:00 AM EDT              

[facility] ([facility])

 

             Individual psychotherapy (regime/therapy)              2021 1

2:00:00 AM EDT              

[facility] ([facility])

 

             Individual psychotherapy (regime/therapy)              2021 1

2:00:00 AM EDT              

[facility] ([facility])

 

             OFFICE OUTPATIENT VISIT 15 MINUTES              2021 12:00:00

 AM EDT              [facility] ([facility])

 

                    Evaluation AND/OR management - established patient (procedur

e)                     2021 

12:00:00 AM EDT                                     [facility]

[facility])

 

                    Evaluation AND/OR management - established patient (procedur

e)                     2021 

12:00:00 AM EDT                                     [facility] ([facility]

[facility])

 

                    Evaluation AND/OR management - established patient (procedur

e)                     2021 

12:00:00 AM EDT                                     [facility] ([facility]

[facility])

 

                    Evaluation AND/OR management - established patient (procedur

e)                     2021 

12:00:00 AM EDT                                     [employer] ([facility]

[facility]itional [facility])

 

                    Evaluation AND/OR management - established patient (procedur

e)                     2021 

12:00:00 AM EDT                                     [facility]

[facility])

 

                    Evaluation AND/OR management - established patient (procedur

e)                     2021 

12:00:00 AM EDT                                     [facility]

[facility])

 

                    Evaluation AND/OR management - established patient (procedur

e)                     2021 

12:00:00 AM EDT                                     [employer] ([facility]

nsitional Living Services)

 

                    Evaluation AND/OR management - established patient (procedur

e)                     2021 

12:00:00 AM EDT                                     [employer] ([facility]

nsitional Living Services)

 

                    Evaluation AND/OR management - established patient (procedur

e)                     2021 

12:00:00 AM EDT                                     [employer] ([facility]

nsitional Living Services)

 

                    Evaluation AND/OR management - established patient (procedur

e)                     2021 

12:00:00 AM EDT                                     [employer] ([facility]

nsitional Living Services)

 

             Individual psychotherapy (regime/therapy)              2021 1

2:00:00 AM EDT              

[employer] ([facility])

 

             Individual psychotherapy (regime/therapy)              2021 1

2:00:00 AM EDT              

[employer] ([facility])

 

             Individual psychotherapy (regime/therapy)              2021 1

2:00:00 AM EDT              

[employer] ([facility])

 

             Individual psychotherapy (regime/therapy)              2021 1

2:00:00 AM EDT              

[employer] ([facility])

 

             Individual psychotherapy (regime/therapy)              2021 1

2:00:00 AM EDT              

[employer] ([facility])

 

             Individual psychotherapy (regime/therapy)              2021 1

2:00:00 AM EDT              

[employer] ([facility])

 

             Individual psychotherapy (regime/therapy)              2021 1

2:00:00 AM EDT              

[employer] ([facility])

 

             Individual psychotherapy (regime/therapy)              2021 1

2:00:00 AM EDT              

[employer] ([facility])

 

             Individual psychotherapy (regime/therapy)              2021 1

2:00:00 AM EDT              

[employer] ([facility])

 

             Individual psychotherapy (regime/therapy)              2021 1

2:00:00 AM EDT              

[employer] ([facility])

 

             Individual psychotherapy (regime/therapy)              2021 1

2:00:00 AM EDT              

[facility])

 

             Individual psychotherapy (regime/therapy)              2021 1

2:00:00 AM EDT              

[facility])

 

             Individual psychotherapy (regime/therapy)              2021 1

2:00:00 AM EDT              

[facility] [place] ([facility])

 

             Individual psychotherapy (regime/therapy)              2021 1

2:00:00 AM EDT              

[facility])

 

             Individual psychotherapy (regime/therapy)              2021 1

2:00:00 AM EDT              

[facility])

 

             Individual psychotherapy (regime/therapy)              2021 1

2:00:00 AM EDT              

[facility] [place] ([facility])

 

             Individual psychotherapy (regime/therapy)              2021 1

2:00:00 AM EDT              

[facility])

 

             Individual psychotherapy (regime/therapy)              2021 1

2:00:00 AM EDT              

[facility])

 

             Individual psychotherapy (regime/therapy)              2021 1

2:00:00 AM EDT              

[facility] [place] ([facility])

 

             Individual psychotherapy (regime/therapy)              2021 1

2:00:00 AM EDT              

[facility])

 

             INJECTION SINGLE/MLT TRIGGER POINT 3/> MUSCLES              

021 12:00:00 AM EST              

[facility] ([facility])

 

             Inj, Anesth Agent, Trigeminal              2021 12:00:00 AM E

ST              [facility] ([facility])

 

             INJECTION ANES OTHER PERIPHERAL NERVE/BRANCH               12:00:00 AM EST              

[facility] ([facility])

 

                    Evaluation AND/OR management - established patient (procedur

e)                     02/15/2021 

12:00:00 AM EST                                     [facility] ([employer] [facility])

 

                    Evaluation AND/OR management - established patient (procedur

e)                     02/15/2021 

12:00:00 AM EST                                     [facility] ([employer])

 

                    Evaluation AND/OR management - established patient (procedur

e)                     02/15/2021 

12:00:00 AM EST                                     [facility] ([facility]

 

                    Evaluation AND/OR management - established patient (procedur

e)                     02/15/2021 

12:00:00 AM EST                                     [facility] ([facility]

[facility])

 

                    Evaluation AND/OR management - established patient (procedur

e)                     02/15/2021 

12:00:00 AM EST                                     [facility] ([facility]

[facility])

 

                    Evaluation AND/OR management - established patient (procedur

e)                     02/15/2021 

12:00:00 AM EST                                     [facility] ([facility]

[facility])

 

                    Evaluation AND/OR management - established patient (procedur

e)                     02/15/2021 

12:00:00 AM EST                                     [facility] ([facility]

[facility])

 

                    Evaluation AND/OR management - established patient (procedur

e)                     02/15/2021 

12:00:00 AM EST                                     [facility] ([facility]

[facility])

 

                    Evaluation AND/OR management - established patient (procedur

e)                     02/15/2021 

12:00:00 AM EST                                     [facility] ([facility]

[facility])

 

                    Evaluation AND/OR management - established patient (procedur

e)                     02/15/2021 

12:00:00 AM EST                                     [facility] ([facility]

[facility])

 

             Individual psychotherapy (regime/therapy)              02/10/2021 1

2:00:00 AM EST              

[facility] ([facility])

 

             Individual psychotherapy (regime/therapy)              02/10/2021 1

2:00:00 AM EST              

[facility] ([facility])

 

             Individual psychotherapy (regime/therapy)              02/10/2021 1

2:00:00 AM EST              

[facility] ([facility])

 

             Individual psychotherapy (regime/therapy)              02/10/2021 1

2:00:00 AM EST              

[facility] ([facility])

 

             Individual psychotherapy (regime/therapy)              02/10/2021 1

2:00:00 AM EST              

[facility] ([facility])

 

             Individual psychotherapy (regime/therapy)              02/10/2021 1

2:00:00 AM EST              

[facility] ([facility])

 

             Individual psychotherapy (regime/therapy)              02/10/2021 1

2:00:00 AM EST              

[facility] ([facility])

 

             Individual psychotherapy (regime/therapy)              02/10/2021 1

2:00:00 AM EST              

[facility] ([facility])

 

             Individual psychotherapy (regime/therapy)              02/10/2021 1

2:00:00 AM EST              

[facility] ([facility])

 

             Individual psychotherapy (regime/therapy)              02/10/2021 1

2:00:00 AM EST              

[facility] ([facility])

 

             INJECTION SINGLE/MLT TRIGGER POINT 3/> MUSCLES              

021 12:00:00 AM EST              

[facility] ([facility])

 

             Inj, Anesth Agent, Trigeminal              2021 12:00:00 AM E

ST              [facility] ([facility] )

 

             INJECTION ANES OTHER PERIPHERAL NERVE/BRANCH               12:00:00 AM EST              

[facility] ([facility] )

 

             Individual psychotherapy (regime/therapy)              2021 1

2:00:00 AM EST              

[facility] ([facility])

 

             Individual psychotherapy (regime/therapy)              2021 1

2:00:00 AM EST              

[facility] ([facility])

 

             Individual psychotherapy (regime/therapy)              2021 1

2:00:00 AM EST              

[facility] ([facility])

 

             Individual psychotherapy (regime/therapy)              2021 1

2:00:00 AM EST              

[facility] ([facility])

 

             Individual psychotherapy (regime/therapy)              2021 1

2:00:00 AM EST              

[facility] ([facility])

 

             Individual psychotherapy (regime/therapy)              2021 1

2:00:00 AM EST              

[facility] ([facility])

 

             Individual psychotherapy (regime/therapy)              2021 1

2:00:00 AM EST              

[facility] ([facility])

 

             Individual psychotherapy (regime/therapy)              2021 1

2:00:00 AM EST              

[facility] ([facility])

 

             Individual psychotherapy (regime/therapy)              2021 1

2:00:00 AM EST              

[facility] ([facility])

 

             Individual psychotherapy (regime/therapy)              2021 1

2:00:00 AM EST              

[facility] ([facility])

 

             Individual psychotherapy (regime/therapy)              2021 1

2:00:00 AM EST              

[facility] ([facility])

 

             Individual psychotherapy (regime/therapy)              2021 1

2:00:00 AM EST              

[facility] ([facility])

 

             Individual psychotherapy (regime/therapy)              2021 1

2:00:00 AM EST              

[facility] ([facility])

 

             Individual psychotherapy (regime/therapy)              2021 1

2:00:00 AM EST              

[facility] ([facility])

 

             Individual psychotherapy (regime/therapy)              2021 1

2:00:00 AM EST              

[facility] ([facility])

 

             Individual psychotherapy (regime/therapy)              2021 1

2:00:00 AM EST              

[facility] ([facility])

 

             Individual psychotherapy (regime/therapy)              2021 1

2:00:00 AM EST              

[facility] ([facility])

 

             Individual psychotherapy (regime/therapy)              2021 1

2:00:00 AM EST              

[facility] ([facility])

 

             Individual psychotherapy (regime/therapy)              2021 1

2:00:00 AM EST              

[facility] ([facility])

 

             Individual psychotherapy (regime/therapy)              2021 1

2:00:00 AM EST              

[facility] ([facility])

 

             Individual psychotherapy (regime/therapy)              2021 1

2:00:00 AM EST              

[facility] ([facility])

 

             Individual psychotherapy (regime/therapy)              2021 1

2:00:00 AM EST              

[facility] ([facility])

 

             Individual psychotherapy (regime/therapy)              2021 1

2:00:00 AM EST              

[facility] ([facility])

 

             Individual psychotherapy (regime/therapy)              2021 1

2:00:00 AM EST              

[facility] ([facility])

 

             Individual psychotherapy (regime/therapy)              2021 1

2:00:00 AM EST              

[facility] ([facility])

 

             Individual psychotherapy (regime/therapy)              2021 1

2:00:00 AM EST              

[facility] ([facility])

 

             Individual psychotherapy (regime/therapy)              2021 1

2:00:00 AM EST              

[facility] ([facility])

 

             Individual psychotherapy (regime/therapy)              2021 1

2:00:00 AM EST              

[facility] ([facility])

 

             Individual psychotherapy (regime/therapy)              2021 1

2:00:00 AM EST              

[facility] ([facility])

 

             Individual psychotherapy (regime/therapy)              2021 1

2:00:00 AM EST              

[facility] ([facility])

 

                    Evaluation AND/OR management - established patient (procedur

e)                     2021 

12:00:00 AM EST                                     [facility] ([facility]

[facility])

 

                    Evaluation AND/OR management - established patient (procedur

e)                     2021 

12:00:00 AM EST                                     [facility] ([facility]

[facility])

 

                    Evaluation AND/OR management - established patient (procedur

e)                     2021 

12:00:00 AM EST                                     [facility] ([facility]

[facility])

 

                    Evaluation AND/OR management - established patient (procedur

e)                     2021 

12:00:00 AM EST                                     [facility] ([facility]

[facility])

 

                    Evaluation AND/OR management - established patient (procedur

e)                     2021 

12:00:00 AM EST                                     [facility] ([facility]

[facility])

 

                    Evaluation AND/OR management - established patient (procedur

e)                     2021 

12:00:00 AM EST                                     [facility] ([facility]

[facility])

 

                    Evaluation AND/OR management - established patient (procedur

e)                     2021 

12:00:00 AM EST                                     [facility] ([facility]

[facility])

 

                    Evaluation AND/OR management - established patient (procedur

e)                     2021 

12:00:00 AM EST                                     [facility] ([facility]

[facility])

 

                    Evaluation AND/OR management - established patient (procedur

e)                     2021 

12:00:00 AM EST                                     [facility] ([facility]

[facility])

 

                    Evaluation AND/OR management - established patient (procedur

e)                     2021 

12:00:00 AM EST                                     [facility] ([facility]

[facility])

 

             Individual psychotherapy (regime/therapy)              2020 1

2:00:00 AM EST              

[facility] ([facility])

 

             Individual psychotherapy (regime/therapy)              2020 1

2:00:00 AM EST              

[facility] ([facility])

 

             Individual psychotherapy (regime/therapy)              2020 1

2:00:00 AM EST              

[facility] ([facility])

 

             Individual psychotherapy (regime/therapy)              2020 1

2:00:00 AM EST              

[facility] ([facility])

 

             Individual psychotherapy (regime/therapy)              2020 1

2:00:00 AM EST              

[facility] ([facility])

 

             Individual psychotherapy (regime/therapy)              2020 1

2:00:00 AM EST              

[facility] ([facility])

 

             Individual psychotherapy (regime/therapy)              2020 1

2:00:00 AM EST              

[facility] ([facility])

 

             Individual psychotherapy (regime/therapy)              2020 1

2:00:00 AM EST              

[facility] ([facility])

 

             Individual psychotherapy (regime/therapy)              2020 1

2:00:00 AM EST              

[facility] ([facility])

 

             Individual psychotherapy (regime/therapy)              2020 1

2:00:00 AM EST              

[facility] ([facility])

 

             Individual psychotherapy (regime/therapy)              2020 1

2:00:00 AM EST              

[facility] ([facility])

 

             Individual psychotherapy (regime/therapy)              2020 1

2:00:00 AM EST              

[facility] ([facility])

 

             Individual psychotherapy (regime/therapy)              2020 1

2:00:00 AM EST              

[facility] ([facility])

 

             Individual psychotherapy (regime/therapy)              2020 1

2:00:00 AM EST              

[facility] ([facility])

 

             Individual psychotherapy (regime/therapy)              2020 1

2:00:00 AM EST              

[facility] ([facility])

 

             Individual psychotherapy (regime/therapy)              2020 1

2:00:00 AM EST              

[facility] ([facility])

 

             Individual psychotherapy (regime/therapy)              2020 1

2:00:00 AM EST              

[facility] ([facility])

 

             Individual psychotherapy (regime/therapy)              2020 1

2:00:00 AM EST              

[facility] ([facility])

 

             Individual psychotherapy (regime/therapy)              2020 1

2:00:00 AM EST              

[facility] ([facility])

 

             Individual psychotherapy (regime/therapy)              2020 1

2:00:00 AM EST              

[facility] ([facility])

 

             Individual psychotherapy (regime/therapy)              2020 1

2:00:00 AM EST              

[facility] ([facility])

 

             Individual psychotherapy (regime/therapy)              2020 1

2:00:00 AM EST              

[facility] ([facility])

 

             Individual psychotherapy (regime/therapy)              2020 1

2:00:00 AM EST              

[facility] ([facility])

 

             Individual psychotherapy (regime/therapy)              2020 1

2:00:00 AM EST              

[facility] ([facility])

 

             Individual psychotherapy (regime/therapy)              2020 1

2:00:00 AM EST              

[facility] ([facility])

 

             Individual psychotherapy (regime/therapy)              2020 1

2:00:00 AM EST              

[facility] ([facility])

 

             Individual psychotherapy (regime/therapy)              2020 1

2:00:00 AM EST              

[facility] ([facility])

 

             Individual psychotherapy (regime/therapy)              2020 1

2:00:00 AM EST              

[facility] ([facility])

 

             Individual psychotherapy (regime/therapy)              2020 1

2:00:00 AM EST              

[facility] ([facility])

 

             Individual psychotherapy (regime/therapy)              2020 1

2:00:00 AM EST              

[facility] ([facility])

 

             Individual psychotherapy (regime/therapy)              2020 1

2:00:00 AM EST              

[facility] ([facility])

 

             Individual psychotherapy (regime/therapy)              2020 1

2:00:00 AM EST              

[facility] ([facility])

 

             Individual psychotherapy (regime/therapy)              2020 1

2:00:00 AM EST              

[facility] ([facility])

 

             Individual psychotherapy (regime/therapy)              2020 1

2:00:00 AM EST              

[facility] ([facility])

 

             Individual psychotherapy (regime/therapy)              2020 1

2:00:00 AM EST              

[facility] [place] ([facility])

 

             Individual psychotherapy (regime/therapy)              2020 1

2:00:00 AM EST              

[facility] [place] ([facility])

 

             Individual psychotherapy (regime/therapy)              2020 1

2:00:00 AM EST              

[facility] [place] ([facility])

 

             Individual psychotherapy (regime/therapy)              2020 1

2:00:00 AM EST              

[facility] [place] ([facility])

 

             Individual psychotherapy (regime/therapy)              2020 1

2:00:00 AM EST              

[facility] [place] ([facility])

 

             Individual psychotherapy (regime/therapy)              2020 1

2:00:00 AM EST              

[facility])

 

             INJECTION SINGLE/MLT TRIGGER POINT 3/> MUSCLES              

020 12:00:00 AM EST              

[facility] ([facility])

 

             Inj, Anesth Agent, Trigeminal              2020 12:00:00 AM E

ST              [facility] ([facility])

 

             INJECTION ANES OTHER PERIPHERAL NERVE/BRANCH              

0 12:00:00 AM EST              

[facility] ([facility])

 

             Individual psychotherapy (regime/therapy)              2020 1

2:00:00 AM EST              

[facility])

 

             Individual psychotherapy (regime/therapy)              2020 1

2:00:00 AM EST              

[facility] [place] ([facility])

 

             Individual psychotherapy (regime/therapy)              2020 1

2:00:00 AM EST              

[facility])

 

             Individual psychotherapy (regime/therapy)              2020 1

2:00:00 AM EST              

[facility] ([facility])

 

             Individual psychotherapy (regime/therapy)              2020 1

2:00:00 AM EST              

[facility] ([facility])

 

             Individual psychotherapy (regime/therapy)              2020 1

2:00:00 AM EST              

[facility]

 

             Individual psychotherapy (regime/therapy)              2020 1

2:00:00 AM EST              

[facility])

 

             Individual psychotherapy (regime/therapy)              2020 1

2:00:00 AM EST              

[facility] ([facility])

 

             Individual psychotherapy (regime/therapy)              2020 1

2:00:00 AM EST              

[facility] ([facility])

 

             Individual psychotherapy (regime/therapy)              2020 1

2:00:00 AM EST              

[facility] ([facility])

 

             Individual psychotherapy (regime/therapy)              2020 1

2:00:00 AM EST              

[facility] ([facility])

 

             Individual psychotherapy (regime/therapy)              2020 1

2:00:00 AM EST              

[facility] ([facility])

 

             Individual psychotherapy (regime/therapy)              2020 1

2:00:00 AM EST              

[facility] ([facility])

 

             Individual psychotherapy (regime/therapy)              2020 1

2:00:00 AM EST              

[facility] ([facility])

 

             Individual psychotherapy (regime/therapy)              2020 1

2:00:00 AM EST              

[facility] ([facility])

 

             Individual psychotherapy (regime/therapy)              2020 1

2:00:00 AM EST              

[facility] [facility]

 

             Individual psychotherapy (regime/therapy)              2020 1

2:00:00 AM EST              

[employer] ([facility])

 

             Individual psychotherapy (regime/therapy)              2020 1

2:00:00 AM EST              

[employer] ([facility])

 

             Individual psychotherapy (regime/therapy)              2020 1

2:00:00 AM EST              

[employer] ([facility])

 

             Individual psychotherapy (regime/therapy)              2020 1

2:00:00 AM EST              

[employer] ([facility])

 

             Individual psychotherapy (regime/therapy)              2020 1

2:00:00 AM EST              

[employer] ([facility])

 

             Individual psychotherapy (regime/therapy)              2020 1

2:00:00 AM EST              

[employer] ([facility])

 

             Individual psychotherapy (regime/therapy)              2020 1

2:00:00 AM EST              

[employer] ([facility])

 

             Individual psychotherapy (regime/therapy)              2020 1

2:00:00 AM EST              

[employer] ([facility])

 

             Individual psychotherapy (regime/therapy)              2020 1

2:00:00 AM EST              

[employer] ([facility])

 

             Individual psychotherapy (regime/therapy)              2020 1

2:00:00 AM EST              

[employer] ([facility])

 

             Individual psychotherapy (regime/therapy)              2020 1

2:00:00 AM EST              

[employer] ([facility])

 

             Individual psychotherapy (regime/therapy)              2020 1

2:00:00 AM EST              

[employer] ([facility])

 

             Individual psychotherapy (regime/therapy)              2020 1

2:00:00 AM EST              

[employer] ([facility])

 

             Individual psychotherapy (regime/therapy)              2020 1

2:00:00 AM EST              

[employer] ([facility])

 

             Individual psychotherapy (regime/therapy)              2020 1

2:00:00 AM EST              

[employer] ([facility])

 

             Individual psychotherapy (regime/therapy)              2020 1

2:00:00 AM EST              

[employer] ([facility])

 

             Individual psychotherapy (regime/therapy)              2020 1

2:00:00 AM EST              

[employer] ([facility])

 

             Individual psychotherapy (regime/therapy)              2020 1

2:00:00 AM EST              

[employer] ([facility])

 

             Individual psychotherapy (regime/therapy)              2020 1

2:00:00 AM EST              

[employer] ([facility])

 

             Individual psychotherapy (regime/therapy)              2020 1

2:00:00 AM EST              

[employer] ([facility])

 

             Individual psychotherapy (regime/therapy)              2020 1

2:00:00 AM EST              

[employer] ([facility])

 

             Individual psychotherapy (regime/therapy)              2020 1

2:00:00 AM EST              

[employer] ([facility])

 

             Individual psychotherapy (regime/therapy)              2020 1

2:00:00 AM EST              

[employer] ([facility])

 

             Individual psychotherapy (regime/therapy)              2020 1

2:00:00 AM EST              

[employer] ([facility])

 

                    Evaluation AND/OR management - established patient (procedur

e)                     2020 

12:00:00 AM EST                                     [employer] ([facility])

 

                    Evaluation AND/OR management - established patient (procedur

e)                     2020 

12:00:00 AM EST                                     [employer] ([facility])

 

                    Evaluation AND/OR management - established patient (procedur

e)                     2020 

12:00:00 AM EST                                     [employer] ([facility])

 

                    Evaluation AND/OR management - established patient (procedur

e)                     2020 

12:00:00 AM EST                                     [employer] ([facility])

 

                    Evaluation AND/OR management - established patient (procedur

e)                     2020 

12:00:00 AM EST                                     [employer] ([facility])

 

                    Evaluation AND/OR management - established patient (procedur

e)                     2020 

12:00:00 AM EST                                     [employer] ([facility])

 

                    Evaluation AND/OR management - established patient (procedur

e)                     2020 

12:00:00 AM EST                                     [employer] ([facility])

 

                    Evaluation AND/OR management - established patient (procedur

e)                     2020 

12:00:00 AM EST                                     [employer] ([facility])

 

                    Evaluation AND/OR management - established patient (procedur

e)                     2020 

12:00:00 AM EST                                     [employer] ([facility])

 

                    Evaluation AND/OR management - established patient (procedur

e)                     2020 

12:00:00 AM EST                                     [facility]

 

             INJECTION SINGLE/MLT TRIGGER POINT 3/> MUSCLES              

020 12:00:00 AM EST              

[facility] ([facility])

 

             Inj, Anesth Agent, Trigeminal              2020 12:00:00 AM E

ST              [facility] ([facility])

 

             INJECTION ANES OTHER PERIPHERAL NERVE/BRANCH              

0 12:00:00 AM EST              

[facility] ([facility])

 

             Individual psychotherapy (regime/therapy)              10/29/2020 1

2:00:00 AM EDT              

[facility])

 

             Individual psychotherapy (regime/therapy)              10/29/2020 1

2:00:00 AM EDT              

[employer] ([facility])

 

             Individual psychotherapy (regime/therapy)              10/29/2020 1

2:00:00 AM EDT              

[employer] ([facility])

 

             Individual psychotherapy (regime/therapy)              10/29/2020 1

2:00:00 AM EDT              

[employer] ([facility])

 

             Individual psychotherapy (regime/therapy)              10/29/2020 1

2:00:00 AM EDT              

[employer] ([facility])

 

             Individual psychotherapy (regime/therapy)              10/29/2020 1

2:00:00 AM EDT              

[employer] ([facility])

 

             Individual psychotherapy (regime/therapy)              10/29/2020 1

2:00:00 AM EDT              

[employer] ([facility])

 

             Individual psychotherapy (regime/therapy)              10/29/2020 1

2:00:00 AM EDT              

[employer] ([facility])

 

             Individual psychotherapy (regime/therapy)              10/29/2020 1

2:00:00 AM EDT              

[facility])

 

             Individual psychotherapy (regime/therapy)              10/29/2020 1

2:00:00 AM EDT              

[employer] ([facility])

 

             Individual psychotherapy (regime/therapy)              10/29/2020 1

2:00:00 AM EDT              

[employer] ([facility])

 

             Individual psychotherapy (regime/therapy)              10/29/2020 1

2:00:00 AM EDT              

[employer] ([facility])

 

             Individual psychotherapy (regime/therapy)              10/29/2020 1

2:00:00 AM EDT              

[facility]

 

             Individual psychotherapy (regime/therapy)              10/29/2020 1

2:00:00 AM EDT              

[employer] ([facility])

 

             Individual psychotherapy (regime/therapy)              10/29/2020 1

2:00:00 AM EDT              

[employer] ([facility])

 

             Individual psychotherapy (regime/therapy)              10/29/2020 1

2:00:00 AM EDT              

[employer] ([facility])

 

             Individual psychotherapy (regime/therapy)              10/29/2020 1

2:00:00 AM EDT              

[employer] ([facility])

 

             Individual psychotherapy (regime/therapy)              10/29/2020 1

2:00:00 AM EDT              

[facility]

 

             Individual psychotherapy (regime/therapy)              10/29/2020 1

2:00:00 AM EDT              

[facility] ([facility])

 

             Individual psychotherapy (regime/therapy)              10/29/2020 1

2:00:00 AM EDT              

[facility])

 

             INJECTION SINGLE/MLT TRIGGER POINT 3/> MUSCLES              10/13/2

020 12:00:00 AM EDT              

[facility] ([facility] )

 

             Inj, Anesth Agent, Trigeminal              10/13/2020 12:00:00 AM E

DT              [facility] ([facility] )

 

             INJECTION ANES OTHER PERIPHERAL NERVE/BRANCH              10/13/202

0 12:00:00 AM EDT              

[facility] ([facility] )

 

             Individual psychotherapy (regime/therapy)              10/08/2020 1

2:00:00 AM EDT              

[facility])

 

             Individual psychotherapy (regime/therapy)              10/08/2020 1

2:00:00 AM EDT              

[facility])

 

             Individual psychotherapy (regime/therapy)              10/08/2020 1

2:00:00 AM EDT              

[facility])

 

             Individual psychotherapy (regime/therapy)              10/08/2020 1

2:00:00 AM EDT              

[facility])

 

             Individual psychotherapy (regime/therapy)              10/08/2020 1

2:00:00 AM EDT              

[facility])

 

             Individual psychotherapy (regime/therapy)              10/08/2020 1

2:00:00 AM EDT              

[facility] ([facility])

 

             Individual psychotherapy (regime/therapy)              10/08/2020 1

2:00:00 AM EDT              

[facility])

 

             Individual psychotherapy (regime/therapy)              10/08/2020 1

2:00:00 AM EDT              

[facility] ([facility])

 

             Individual psychotherapy (regime/therapy)              10/08/2020 1

2:00:00 AM EDT              

[facility] ([facility])

 

             Individual psychotherapy (regime/therapy)              10/08/2020 1

2:00:00 AM EDT              

[facility])

 

             Individual psychotherapy (regime/therapy)              10/08/2020 1

2:00:00 AM EDT              

[facility])

 

             Individual psychotherapy (regime/therapy)              10/08/2020 1

2:00:00 AM EDT              

[facility] ([facility])

 

             Individual psychotherapy (regime/therapy)              10/08/2020 1

2:00:00 AM EDT              

[facility] ([facility])

 

             Individual psychotherapy (regime/therapy)              10/08/2020 1

2:00:00 AM EDT              

[facility] ([facility])

 

             Individual psychotherapy (regime/therapy)              10/08/2020 1

2:00:00 AM EDT              

[facility] ([facility])

 

             Individual psychotherapy (regime/therapy)              10/08/2020 1

2:00:00 AM EDT              

[facility] ([facility])

 

             Individual psychotherapy (regime/therapy)              10/08/2020 1

2:00:00 AM EDT              

[facility] ([facility])

 

             Individual psychotherapy (regime/therapy)              10/08/2020 1

2:00:00 AM EDT              

[facility] ([facility])

 

             Individual psychotherapy (regime/therapy)              10/08/2020 1

2:00:00 AM EDT              

[facility] ([facility])

 

             Individual psychotherapy (regime/therapy)              10/08/2020 1

2:00:00 AM EDT              

[facility] ([facility])

 

             Individual psychotherapy (regime/therapy)              2020 1

2:00:00 AM EDT              

[facility] ([facility])

 

                    Evaluation AND/OR management - established patient (procedur

e)                     2020 

12:00:00 AM EDT                                     [facility] ([facility]

[facility])

 

             Individual psychotherapy (regime/therapy)              2020 1

2:00:00 AM EDT              

[facility] ([facility])

 

                    Evaluation AND/OR management - established patient (procedur

e)                     2020 

12:00:00 AM EDT                                     [facility] ([facility]

[facility])

 

             Individual psychotherapy (regime/therapy)              2020 1

2:00:00 AM EDT              

[facility] ([facility])

 

                    Evaluation AND/OR management - established patient (procedur

e)                     2020 

12:00:00 AM EDT                                     [facility] ([facility]

[facility])

 

             Individual psychotherapy (regime/therapy)              2020 1

2:00:00 AM EDT              

[facility] ([facility])

 

                    Evaluation AND/OR management - established patient (procedur

e)                     2020 

12:00:00 AM EDT                                     [facility] ([facility])

 

             Individual psychotherapy (regime/therapy)              2020 1

2:00:00 AM EDT              

[facility] ([facility])

 

                    Evaluation AND/OR management - established patient (procedur

e)                     2020 

12:00:00 AM EDT                                     [facility] ([facility])

 

             Individual psychotherapy (regime/therapy)              2020 1

2:00:00 AM EDT              

[facility] ([facility])

 

                    Evaluation AND/OR management - established patient (procedur

e)                     2020 

12:00:00 AM EDT                                     [facility] ([employer] [facility])

 

             Individual psychotherapy (regime/therapy)              2020 1

2:00:00 AM EDT              

[facility] ([facility])

 

                    Evaluation AND/OR management - established patient (procedur

e)                     2020 

12:00:00 AM EDT                                     [facility] ([employer])

 

             Individual psychotherapy (regime/therapy)              2020 1

2:00:00 AM EDT              

[facility] ([facility])

 

                    Evaluation AND/OR management - established patient (procedur

e)                     2020 

12:00:00 AM EDT                                     [facility] ([employer] [facility])

 

             Individual psychotherapy (regime/therapy)              2020 1

2:00:00 AM EDT              

[facility] ([facility])

 

                    Evaluation AND/OR management - established patient (procedur

e)                     2020 

12:00:00 AM EDT                                     [facility] ([employer])

 

             Individual psychotherapy (regime/therapy)              2020 1

2:00:00 AM EDT              

[facility] ([facility])

 

                    Evaluation AND/OR management - established patient (procedur

e)                     2020 

12:00:00 AM EDT                                     [facility] ([employer] [facility])

 

             INJECTION SINGLE/MLT TRIGGER POINT 3/> MUSCLES              

020 12:00:00 AM EDT              

[facility] ([facility])

 

             Inj, Anesth Agent, Trigeminal              2020 12:00:00 AM E

DT              [facility] ([facility])

 

             INJECTION ANES OTHER PERIPHERAL NERVE/BRANCH              

0 12:00:00 AM EDT              

[facility] ([facility])

 

             Individual psychotherapy (regime/therapy)              2020 1

2:00:00 AM EDT              

[facility] ([facility])

 

             Individual psychotherapy (regime/therapy)              2020 1

2:00:00 AM EDT              

[facility] ([facility])

 

             Individual psychotherapy (regime/therapy)              2020 1

2:00:00 AM EDT              

[facility] ([facility])

 

             Individual psychotherapy (regime/therapy)              2020 1

2:00:00 AM EDT              

[facility] ([facility])

 

             Individual psychotherapy (regime/therapy)              2020 1

2:00:00 AM EDT              

[facility] ([facility])

 

             Individual psychotherapy (regime/therapy)              2020 1

2:00:00 AM EDT              

[facility] ([facility])

 

             Individual psychotherapy (regime/therapy)              2020 1

2:00:00 AM EDT              

[facility])

 

             Individual psychotherapy (regime/therapy)              2020 1

2:00:00 AM EDT              

[facility])

 

             Individual psychotherapy (regime/therapy)              2020 1

2:00:00 AM EDT              

[facility])

 

             Individual psychotherapy (regime/therapy)              2020 1

2:00:00 AM EDT              

[facility])

 

             Individual psychotherapy (regime/therapy)              2020 1

2:00:00 AM EDT              

[facility] ([facility])

 

             Individual psychotherapy (regime/therapy)              2020 1

2:00:00 AM EDT              

[facility]

 

             Individual psychotherapy (regime/therapy)              2020 1

2:00:00 AM EDT              

[facility] ([facility])

 

             Individual psychotherapy (regime/therapy)              2020 1

2:00:00 AM EDT              

[facility] ([facility])

 

             Individual psychotherapy (regime/therapy)              2020 1

2:00:00 AM EDT              

[facility] ([facility])

 

             Individual psychotherapy (regime/therapy)              2020 1

2:00:00 AM EDT              

[facility] ([facility])

 

             Individual psychotherapy (regime/therapy)              2020 1

2:00:00 AM EDT              

[facility] ([facility])

 

             Individual psychotherapy (regime/therapy)              2020 1

2:00:00 AM EDT              

[facility])

 

             Individual psychotherapy (regime/therapy)              2020 1

2:00:00 AM EDT              

[facility] ([facility])

 

             Individual psychotherapy (regime/therapy)              2020 1

2:00:00 AM EDT              

[facility])



                                                                                
                                                                                
                                                                                
                                                                                
                                                                                
                                                                                
                                                                                
                                                                                
                                                                                
                                                                                
                                                                                
                                                                                
                                                                                
                                                                                
                                                                                
                                                                                
                                                                                
                                                                                
                                                                                
                                                                                
                                                                                
                                                                                
                                                                                
                                                                                
                                                                                
                                                                                
                                                                                
                                                                                
                                                                                
                                                                                
                                                                                
                                                                                
                                                                                
                                                                                
                                                                                
                                                                                
                                                                                
                                                                                
                                                                                
                                                                                
                                                                                
                                                                                
                                                                                
                                                                                
                                                                                
                                                                                
                                                                                
                                                                                
                                                                                
                                                                                
                                                                                
                                                                                
                                                                                
                                                                                
                                                                                
                                                                                
                                                                                
                                                                                
                                                                                
                                                                                
                                                                                
                                                                                
                  



Results

          No Information                                                        
                      



Social History

          No Information                                                        
                                



Vital Signs

          



                    ID                  Date                Data Source

 

                    UNK                                      









           Name       Value      Range      Interpretation Code Description Data

 Source(s)

 

           Systolic blood pressure 130 mm[Hg]                       130 mm[Hg] M

[facility]

 

           Diastolic blood pressure 80 mm[Hg]                        80 mm[Hg]  

[facility] ([facility] 

[facility], )

 

           Respiratory rate 20 /min                          20 /min    [facility] (

[facility], )

 

           Heart rate 80 /min                          80 /min    [facility] ([facility], )

 

           Respiratory rate 16 /min                          16 /min    [facility] (

[facility], )

 

           Heart rate 76 /min                          76 /min    [facility] ([facility], )

 

           Diastolic blood pressure 80 mm[Hg]                        80 mm[Hg]  

[facility] ([facility] 

[facility], )

 

           Systolic blood pressure 110 mm[Hg]                       110 mm[Hg] M

[facility]



                                                                                
                  



Patient Treatment Plan of Care

          



             Planned Activity Planned Date Details      Description  Data Source

(s)

 

                          24 HR venlafaxine 150 MG Extended Release Oral Capsule

 [Effexor] 2021 

12:00:00 AM EDT                                             [facility])

 

                          24 HR venlafaxine 75 MG Extended Release Oral Capsule 

[Effexor] 2021 

12:00:00 AM EDT                                             [facility])

 

             buspirone hydrochloride 10 MG Oral Tablet 2021 12:00:00 AM ED

T                           

[facility])

 

             buspirone hydrochloride 10 MG Oral Tablet 2021 12:00:00 AM ED

T                           

[facility] [place] ([facility])

 

                          24 HR venlafaxine 75 MG Extended Release Oral Capsule 

[Effexor] 2021 

12:00:00 AM EDT                                             [facility] ([facility])

 

                          24 HR venlafaxine 150 MG Extended Release Oral Capsule

 [Effexor] 2021 

12:00:00 AM EDT                                             [facility] ([facility])

 

             buspirone hydrochloride 10 MG Oral Tablet 2021 12:00:00 AM ED

T                           

[facility] ([facility])

 

                          24 HR venlafaxine 150 MG Extended Release Oral Capsule

 [Effexor] 2021 

12:00:00 AM EDT                                             [facility]

 

                          24 HR venlafaxine 75 MG Extended Release Oral Capsule 

[Effexor] 2021 

12:00:00 AM EDT                                             [facility]

 

                          24 HR venlafaxine 150 MG Extended Release Oral Capsule

 [Effexor] 2021 

12:00:00 AM EDT                                             [facility] ([facility])

 

                          24 HR venlafaxine 75 MG Extended Release Oral Capsule 

[Effexor] 2021 

12:00:00 AM EDT                                             [facility]

 

             buspirone hydrochloride 10 MG Oral Tablet 2021 12:00:00 AM ED

T                           

[facility])

 

                          24 HR venlafaxine 150 MG Extended Release Oral Capsule

 [Effexor] 2021 

12:00:00 AM EDT                                             [facility]

 

             buspirone hydrochloride 10 MG Oral Tablet 2021 12:00:00 AM ED

T                           

[facility])

 

                          24 HR venlafaxine 75 MG Extended Release Oral Capsule 

[Effexor] 2021 

12:00:00 AM EDT                                             [facility] ([facility])

 

             buspirone hydrochloride 10 MG Oral Tablet 2021 12:00:00 AM ED

T                           

[facility]

 

                          24 HR venlafaxine 150 MG Extended Release Oral Capsule

 [Effexor] 2021 

12:00:00 AM EDT                                             [facility]

 

                          24 HR venlafaxine 75 MG Extended Release Oral Capsule 

[Effexor] 2021 

12:00:00 AM EDT                                             [facility]

 

                          24 HR venlafaxine 75 MG Extended Release Oral Capsule 

[Effexor] 2021 

12:00:00 AM EDT                                             [facility]

 

                          24 HR venlafaxine 75 MG Extended Release Oral Capsule 

[Effexor] 2021 

12:00:00 AM EDT                                             [facility]

 

             buspirone hydrochloride 10 MG Oral Tablet 2021 12:00:00 AM ED

T                           

[facility]

 

                          24 HR venlafaxine 150 MG Extended Release Oral Capsule

 [Effexor] 2021 

12:00:00 AM EDT                                             [facility]

 

                          24 HR venlafaxine 150 MG Extended Release Oral Capsule

 [Effexor] 2021 

12:00:00 AM EDT                                             [facility]

 

                          24 HR venlafaxine 75 MG Extended Release Oral Capsule 

[Effexor] 2021 

12:00:00 AM EDT                                             [facility]

 

                          24 HR venlafaxine 150 MG Extended Release Oral Capsule

 [Effexor] 2021 

12:00:00 AM EDT                                             [facility]

 

             buspirone hydrochloride 10 MG Oral Tablet 2021 12:00:00 AM ED

T                           

[facility])

 

             buspirone hydrochloride 10 MG Oral Tablet 2021 12:00:00 AM ED

T                           

[facility])

 

             buspirone hydrochloride 10 MG Oral Tablet 2021 12:00:00 AM ED

T                           

[facility]

 

                          24 HR venlafaxine 75 MG Extended Release Oral Capsule 

[Effexor] 2021 

12:00:00 AM EDT                                             [facility]

 

                          24 HR venlafaxine 150 MG Extended Release Oral Capsule

 [Effexor] 2021 

12:00:00 AM EDT                                             [facility]

 

                          24 HR venlafaxine 75 MG Extended Release Oral Capsule 

[Effexor] 2021 

12:00:00 AM EDT                                             [facility]

 

                          24 HR venlafaxine 75 MG Extended Release Oral Capsule 

[Effexor] 2021 

12:00:00 AM EDT                                             [facility] ([facility])

 

                          24 HR venlafaxine 75 MG Extended Release Oral Capsule 

[Effexor] 2021 

12:00:00 AM EDT                                             [facility]

 

                          24 HR venlafaxine 75 MG Extended Release Oral Capsule 

[Effexor] 2021 

12:00:00 AM EDT                                             [facility]

 

                          24 HR venlafaxine 75 MG Extended Release Oral Capsule 

[Effexor] 2021 

12:00:00 AM EDT                                             [facility]

 

                          24 HR venlafaxine 75 MG Extended Release Oral Capsule 

[Effexor] 2021 

12:00:00 AM EDT                                             [facility]

 

                          24 HR venlafaxine 75 MG Extended Release Oral Capsule 

[Effexor] 2021 

12:00:00 AM EDT                                             [facility]

 

                          24 HR venlafaxine 75 MG Extended Release Oral Capsule 

[Effexor] 2021 

12:00:00 AM EDT                                             [facility] ([facility])

 

                          24 HR venlafaxine 75 MG Extended Release Oral Capsule 

[Effexor] 2021 

12:00:00 AM EDT                                             [facility] ([facility])

 

                          24 HR venlafaxine 75 MG Extended Release Oral Capsule 

[Effexor] 2021 

12:00:00 AM EDT                                             [facility] ([facility])

 

             Mirtazapine 15 MG Oral Tablet [Remeron] 2021 12:00:00 AM EDT 

                          [facility]

 ([facility])

 

             Mirtazapine 15 MG Oral Tablet [Remeron] 2021 12:00:00 AM EDT 

                          [facility]

 ([facility])

 

             Mirtazapine 15 MG Oral Tablet [Remeron] 2021 12:00:00 AM EDT 

                          [facility]

 ([facility])

 

             Mirtazapine 15 MG Oral Tablet [Remeron] 2021 12:00:00 AM EDT 

                          [facility]

 ([facility])

 

             Mirtazapine 15 MG Oral Tablet [Remeron] 2021 12:00:00 AM EDT 

                          [facility]

 ([facility])

 

             Mirtazapine 15 MG Oral Tablet [Remeron] 2021 12:00:00 AM EDT 

                          [facility]

 ([facility])

 

             Mirtazapine 15 MG Oral Tablet [Remeron] 2021 12:00:00 AM EDT 

                          [facility]

 ([facility])

 

             Mirtazapine 15 MG Oral Tablet [Remeron] 2021 12:00:00 AM EDT 

                          [facility]

 ([facility])

 

             Mirtazapine 15 MG Oral Tablet [Remeron] 2021 12:00:00 AM EDT 

                          [facility]

 ([facility])

 

             Mirtazapine 15 MG Oral Tablet [Remeron] 2021 12:00:00 AM EDT 

                          [facility]

 ([facility])

 

                          24 HR venlafaxine 150 MG Extended Release Oral Capsule

 [Effexor] 2021 

12:00:00 AM EST                                             [facility]

 

                          24 HR venlafaxine 150 MG Extended Release Oral Capsule

 [Effexor] 2021 

12:00:00 AM EST                                             [facility])

 

                          24 HR venlafaxine 150 MG Extended Release Oral Capsule

 [Effexor] 2021 

12:00:00 AM EST                                             [facility] ([facility])

 

                          24 HR venlafaxine 150 MG Extended Release Oral Capsule

 [Effexor] 2021 

12:00:00 AM EST                                             [facility] ([facility])

 

                          24 HR venlafaxine 150 MG Extended Release Oral Capsule

 [Effexor] 2021 

12:00:00 AM EST                                             [facility] ([facility])

 

                          24 HR venlafaxine 150 MG Extended Release Oral Capsule

 [Effexor] 2021 

12:00:00 AM EST                                             [facility] ([facility])

 

                          24 HR venlafaxine 150 MG Extended Release Oral Capsule

 [Effexor] 2021 

12:00:00 AM EST                                             [facility] ([facility])

 

                          24 HR venlafaxine 150 MG Extended Release Oral Capsule

 [Effexor] 2021 

12:00:00 AM EST                                             [facility] ([facility])

 

                          24 HR venlafaxine 150 MG Extended Release Oral Capsule

 [Effexor] 2021 

12:00:00 AM EST                                             [facility] ([facility])

 

                          24 HR venlafaxine 150 MG Extended Release Oral Capsule

 [Effexor] 2021 

12:00:00 AM EST                                             [facility] ([facility])

 

                          24 HR venlafaxine 75 MG Extended Release Oral Capsule 

[Effexor] 02/15/2021 

12:00:00 AM EST                                             [facility] ([facility])

 

                          24 HR venlafaxine 75 MG Extended Release Oral Capsule 

[Effexor] 02/15/2021 

12:00:00 AM EST                                             [employer] ([facility])

 

                          24 HR venlafaxine 75 MG Extended Release Oral Capsule 

[Effexor] 02/15/2021 

12:00:00 AM EST                                             [employer] ([facility])

 

                          24 HR venlafaxine 75 MG Extended Release Oral Capsule 

[Effexor] 02/15/2021 

12:00:00 AM EST                                             [facility] ([facility])

 

                          24 HR venlafaxine 75 MG Extended Release Oral Capsule 

[Effexor] 02/15/2021 

12:00:00 AM EST                                             [facility] ([facility])

 

                          24 HR venlafaxine 75 MG Extended Release Oral Capsule 

[Effexor] 02/15/2021 

12:00:00 AM EST                                             [employer] ([facility])

 

                          24 HR venlafaxine 75 MG Extended Release Oral Capsule 

[Effexor] 02/15/2021 

12:00:00 AM EST                                             [facility] ([facility])

 

                          24 HR venlafaxine 75 MG Extended Release Oral Capsule 

[Effexor] 02/15/2021 

12:00:00 AM EST                                             [facility] ([facility])

 

                          24 HR venlafaxine 75 MG Extended Release Oral Capsule 

[Effexor] 02/15/2021 

12:00:00 AM EST                                             [employer] ([facility])

 

                          24 HR venlafaxine 75 MG Extended Release Oral Capsule 

[Effexor] 02/15/2021 

12:00:00 AM EST                                             [employer] ([facility])

 

             Mirtazapine 15 MG Oral Tablet [Remeron] 2021 12:00:00 AM EST 

                          [employer]

 ([facility])

 

             Mirtazapine 15 MG Oral Tablet [Remeron] 2021 12:00:00 AM EST 

                          [employer]

 ([facility])

 

             Mirtazapine 15 MG Oral Tablet [Remeron] 2021 12:00:00 AM EST 

                          [employer]

 ([facility])

 

             Mirtazapine 15 MG Oral Tablet [Remeron] 2021 12:00:00 AM EST 

                          [facility]

 ([facility])

 

             Mirtazapine 15 MG Oral Tablet [Remeron] 2021 12:00:00 AM EST 

                          [facility]

 ([facility])

 

             Mirtazapine 15 MG Oral Tablet [Remeron] 2021 12:00:00 AM EST 

                          [facility]

 ([facility])

 

             Mirtazapine 15 MG Oral Tablet [Remeron] 2021 12:00:00 AM EST 

                          [facility]

 ([facility])

 

             Mirtazapine 15 MG Oral Tablet [Remeron] 2021 12:00:00 AM EST 

                          [facility]

 ([facility])

 

             Mirtazapine 15 MG Oral Tablet [Remeron] 2021 12:00:00 AM [facility]

 ([facility])

 

             Mirtazapine 15 MG Oral Tablet [Remeron] 2021 12:00:00 AM EST 

                          [facility]

 ([facility])

 

             Hydroxyzine Hydrochloride 25 MG Oral Tablet 2021 12:00:00 AM 

[facility] ([facility])

 

             Hydroxyzine Hydrochloride 25 MG Oral Tablet 2021 12:00:00 AM 

EST                           

[facility] ([facility])

 

             Hydroxyzine Hydrochloride 25 MG Oral Tablet 2021 12:00:00 AM 

[facility] ([facility] [facility])

 

             Hydroxyzine Hydrochloride 25 MG Oral Tablet 2021 12:00:00 AM 

[facility] ([facility])

 

             Hydroxyzine Hydrochloride 25 MG Oral Tablet 2021 12:00:00 AM 

[facility] ([facility])

 

             Hydroxyzine Hydrochloride 25 MG Oral Tablet 2021 12:00:00 AM 

[facility] ([facility])

 

             Hydroxyzine Hydrochloride 25 MG Oral Tablet 2021 12:00:00 AM 

[facility] ([facility])

 

             Hydroxyzine Hydrochloride 25 MG Oral Tablet 2021 12:00:00 AM 

[facility] ([facility])

 

             Hydroxyzine Hydrochloride 25 MG Oral Tablet 2021 12:00:00 AM 

[facility] ([facility])

 

             Hydroxyzine Hydrochloride 25 MG Oral Tablet 2021 12:00:00 AM 

EST                           

[facility] [place] ([facility])

 

                          24 HR venlafaxine 75 MG Extended Release Oral Capsule 

[Effexor] 2021 

12:00:00 AM EST                                             [facility] ([facility])

 

                          24 HR venlafaxine 75 MG Extended Release Oral Capsule 

[Effexor] 2021 

12:00:00 AM EST                                             [facility] ([facility])

 

                          24 HR venlafaxine 75 MG Extended Release Oral Capsule 

[Effexor] 2021 

12:00:00 AM [facility] ([facility])

 

                          24 HR venlafaxine 75 MG Extended Release Oral Capsule 

[Effexor] 2021 

12:00:00 AM EST                                             [facility] ([facility])

 

                          24 HR venlafaxine 75 MG Extended Release Oral Capsule 

[Effexor] 2021 

12:00:00 AM EST                                             [facility] ([facility])

 

                          24 HR venlafaxine 75 MG Extended Release Oral Capsule 

[Effexor] 2021 

12:00:00 AM EST                                             [facility] ([facility])

 

                          24 HR venlafaxine 75 MG Extended Release Oral Capsule 

[Effexor] 2021 

12:00:00 AM EST                                             [facility] ([facility])

 

                          24 HR venlafaxine 75 MG Extended Release Oral Capsule 

[Effexor] 2021 

12:00:00 AM EST                                             [facility] ([facility])

 

                          24 HR venlafaxine 75 MG Extended Release Oral Capsule 

[Effexor] 2021 

12:00:00 AM EST                                             [facility] ([facility])

 

                          24 HR venlafaxine 75 MG Extended Release Oral Capsule 

[Effexor] 2021 

12:00:00 AM EST                                             [facility] ([facility])

 

                          24 HR Bupropion Hydrochloride 300 MG Extended Release 

Oral Tablet [Wellbutrin] 

2020 12:00:00 AM EST                                         [facility]No

[facility]

 

                          24 HR venlafaxine 150 MG Extended Release Oral Capsule

 [Effexor] 2020 

12:00:00 AM EST                                             [facility] ([facility])

 

                          24 HR venlafaxine 37.5 MG Extended Release Oral Capsul

e [Effexor] 2020 

12:00:00 AM EST                                             [facility] ([facility])

 

                          24 HR Bupropion Hydrochloride 300 MG Extended Release 

Oral Tablet [Wellbutrin] 

2020 12:00:00 AM EST                                         [facility] (No

[facility]

 

                          24 HR venlafaxine 150 MG Extended Release Oral Capsule

 [Effexor] 2020 

12:00:00 AM EST                                             [facility] ([facility])

 

                          24 HR venlafaxine 37.5 MG Extended Release Oral Capsul

e [Effexor] 2020 

12:00:00 AM EST                                             [facility] ([facility])

 

                          24 HR venlafaxine 37.5 MG Extended Release Oral Capsul

e [Effexor] 2020 

12:00:00 AM EST                                             [facility] ([facility])

 

                          24 HR venlafaxine 150 MG Extended Release Oral Capsule

 [Effexor] 2020 

12:00:00 AM EST                                             [facility] ([facility])

 

                          24 HR venlafaxine 150 MG Extended Release Oral Capsule

 [Effexor] 2020 

12:00:00 AM EST                                             [facility] ([facility])

 

                          24 HR Bupropion Hydrochloride 300 MG Extended Release 

Oral Tablet [Wellbutrin] 

2020 12:00:00 AM EST                                         [facility] (No

[facility] 

[facility])

 

                          24 HR venlafaxine 37.5 MG Extended Release Oral Capsul

e [Effexor] 2020 

12:00:00 AM EST                                             [facility] ([facility])

 

                          24 HR venlafaxine 37.5 MG Extended Release Oral Capsul

e [Effexor] 2020 

12:00:00 AM EST                                             [facility] ([facility])

 

                          24 HR Bupropion Hydrochloride 300 MG Extended Release 

Oral Tablet [Wellbutrin] 

2020 12:00:00 AM EST                                         [facility] (No

[facility])

 

                          24 HR venlafaxine 150 MG Extended Release Oral Capsule

 [Effexor] 2020 

12:00:00 AM EST                                             [facility] ([facility])

 

                          24 HR venlafaxine 150 MG Extended Release Oral Capsule

 [Effexor] 2020 

12:00:00 AM EST                                             [facility] ([facility])

 

                          24 HR Bupropion Hydrochloride 300 MG Extended Release 

Oral Tablet [Wellbutrin] 

2020 12:00:00 AM EST                                         [facility] (No

[facility])

 

                          24 HR venlafaxine 37.5 MG Extended Release Oral Capsul

e [Effexor] 2020 

12:00:00 AM EST                                             [facility] ([facility])

 

                          24 HR Bupropion Hydrochloride 300 MG Extended Release 

Oral Tablet [Wellbutrin] 

2020 12:00:00 AM EST                                         [facility] (No

[facility]

 

                          24 HR venlafaxine 37.5 MG Extended Release Oral Capsul

e [Effexor] 2020 

12:00:00 AM EST                                             [facility] ([facility])

 

                          24 HR Bupropion Hydrochloride 300 MG Extended Release 

Oral Tablet [Wellbutrin] 

2020 12:00:00 AM EST                                         [facility]

[facility]

 

                          24 HR venlafaxine 150 MG Extended Release Oral Capsule

 [Effexor] 2020 

12:00:00 AM EST                                             [facility] ([facility])

 

                          24 HR venlafaxine 37.5 MG Extended Release Oral Capsul

e [Effexor] 2020 

12:00:00 AM EST                                             [facility] ([facility])

 

                          24 HR venlafaxine 37.5 MG Extended Release Oral Capsul

e [Effexor] 2020 

12:00:00 AM EST                                             [facility] ([facility])

 

                          24 HR Bupropion Hydrochloride 300 MG Extended Release 

Oral Tablet [Wellbutrin] 

2020 12:00:00 AM EST                                         [facility]

 

                          24 HR venlafaxine 150 MG Extended Release Oral Capsule

 [Effexor] 2020 

12:00:00 AM EST                                             [facility] ([facility])

 

                          24 HR Bupropion Hydrochloride 300 MG Extended Release 

Oral Tablet [Wellbutrin] 

2020 12:00:00 AM EST                                         [facility]

[facility]

 

                          24 HR venlafaxine 150 MG Extended Release Oral Capsule

 [Effexor] 2020 

12:00:00 AM EST                                             [facility] ([facility])

 

                          24 HR Bupropion Hydrochloride 300 MG Extended Release 

Oral Tablet [Wellbutrin] 

2020 12:00:00 AM EST                                         [facility]

[facility]

 

                          24 HR venlafaxine 37.5 MG Extended Release Oral Capsul

e [Effexor] 2020 

12:00:00 AM EST                                             [facility] ([facility])

 

                          24 HR venlafaxine 150 MG Extended Release Oral Capsule

 [Effexor] 2020 

12:00:00 AM EST                                             [facility]

 

                          24 HR venlafaxine 150 MG Extended Release Oral Capsule

 [Effexor] 2020 

12:00:00 AM EST                                             [facility]

 

             Mirtazapine 15 MG Oral Tablet [Remeron] 2020 12:00:00 AM EST 

                          [facility]

 ([facility])

 

                          24 HR venlafaxine 37.5 MG Extended Release Oral Capsul

e [Effexor] 2020 

12:00:00 AM EST                                             [facility]

 

                          24 HR venlafaxine 150 MG Extended Release Oral Capsule

 [Effexor] 2020 

12:00:00 AM EST                                             [facility]

 

             Mirtazapine 15 MG Oral Tablet [Remeron] 2020 12:00:00 AM EST 

                          [facility]

 ([facility])

 

                          24 HR venlafaxine 150 MG Extended Release Oral Capsule

 [Effexor] 2020 

12:00:00 AM EST                                             [facility]

 

                          24 HR venlafaxine 37.5 MG Extended Release Oral Capsul

e [Effexor] 2020 

12:00:00 AM EST                                             [facility]

 

             Mirtazapine 15 MG Oral Tablet [Remeron] 2020 12:00:00 AM EST 

                          [facility]

 ([facility])

 

             Mirtazapine 15 MG Oral Tablet [Remeron] 2020 12:00:00 AM EST 

                          [facility]

 ([facility])

 

                          24 HR venlafaxine 37.5 MG Extended Release Oral Capsul

e [Effexor] 2020 

12:00:00 AM EST                                             [facility]

 

                          24 HR venlafaxine 150 MG Extended Release Oral Capsule

 [Effexor] 2020 

12:00:00 AM EST                                             [facility]

 

                          24 HR venlafaxine 37.5 MG Extended Release Oral Capsul

e [Effexor] 2020 

12:00:00 AM EST                                             [facility] ([facility])

 

             Mirtazapine 15 MG Oral Tablet [Remeron] 2020 12:00:00 AM EST 

                          [facility]

 ([facility])

 

                          24 HR venlafaxine 150 MG Extended Release Oral Capsule

 [Effexor] 2020 

12:00:00 AM EST                                             [facility] ([facility])

 

                          24 HR venlafaxine 37.5 MG Extended Release Oral Capsul

e [Effexor] 2020 

12:00:00 AM EST                                             [facility] ([facility])

 

                          24 HR venlafaxine 37.5 MG Extended Release Oral Capsul

e [Effexor] 2020 

12:00:00 AM EST                                             [facility] ([facility])

 

             Mirtazapine 15 MG Oral Tablet [Remeron] 2020 12:00:00 AM EST 

                          [facility]

 ([facility])

 

                          24 HR venlafaxine 150 MG Extended Release Oral Capsule

 [Effexor] 2020 

12:00:00 AM EST                                             [facility] ([facility])

 

                          24 HR venlafaxine 150 MG Extended Release Oral Capsule

 [Effexor] 2020 

12:00:00 AM EST                                             [facility] ([facility])

 

             Mirtazapine 15 MG Oral Tablet [Remeron] 2020 12:00:00 AM EST 

                          [facility]

 ([facility])

 

                          24 HR venlafaxine 37.5 MG Extended Release Oral Capsul

e [Effexor] 2020 

12:00:00 AM EST                                             [facility] ([facility])

 

                          24 HR venlafaxine 150 MG Extended Release Oral Capsule

 [Effexor] 2020 

12:00:00 AM EST                                             [facility] ([facility])

 

                          24 HR venlafaxine 150 MG Extended Release Oral Capsule

 [Effexor] 2020 

12:00:00 AM EST                                             [facility] ([facility])

 

             Mirtazapine 15 MG Oral Tablet [Remeron] 2020 12:00:00 AM EST 

                          [facility]

 ([facility])

 

                          24 HR venlafaxine 37.5 MG Extended Release Oral Capsul

e [Effexor] 2020 

12:00:00 AM EST                                             [facility] ([facility])

 

             Mirtazapine 15 MG Oral Tablet [Remeron] 2020 12:00:00 AM EST 

                          [facility]

 ([facility])

 

                          24 HR venlafaxine 37.5 MG Extended Release Oral Capsul

e [Effexor] 2020 

12:00:00 AM EST                                             [facility] ([facility])

 

             Mirtazapine 15 MG Oral Tablet [Remeron] 2020 12:00:00 AM EST 

                          [facility]

 ([facility])

 

                          24 HR venlafaxine 150 MG Extended Release Oral Capsule

 [Effexor] 2020 

12:00:00 AM EST                                             [facility] ([facility])

 

                          24 HR venlafaxine 37.5 MG Extended Release Oral Capsul

e [Effexor] 2020 

12:00:00 AM EST                                             [facility]

 

             Hydroxyzine Hydrochloride 25 MG Oral Tablet 2020 12:00:00 AM 

EST                           

[facility]

 

             Hydroxyzine Hydrochloride 25 MG Oral Tablet 2020 12:00:00 AM 

EST                           

[facility]

 

             Hydroxyzine Hydrochloride 25 MG Oral Tablet 2020 12:00:00 AM 

EST                           

[facility] ([facility])

 

             Hydroxyzine Hydrochloride 25 MG Oral Tablet 2020 12:00:00 AM 

EST                           

[facility] ([facility])

 

             Hydroxyzine Hydrochloride 25 MG Oral Tablet 2020 12:00:00 AM 

EST                           

[facility] ([facility])

 

             Hydroxyzine Hydrochloride 25 MG Oral Tablet 2020 12:00:00 AM 

EST                           

[facility] ([facility])

 

             Hydroxyzine Hydrochloride 25 MG Oral Tablet 2020 12:00:00 AM 

EST                           

[facility] ([facility])

 

             Hydroxyzine Hydrochloride 25 MG Oral Tablet 2020 12:00:00 AM 

EST                           

[facility]

 

             Hydroxyzine Hydrochloride 25 MG Oral Tablet 2020 12:00:00 AM 

EST                           

[facility] ([facility])

 

             Hydroxyzine Hydrochloride 25 MG Oral Tablet 2020 12:00:00 AM 

EST                           

[facility] ([facility])

 

                          24 HR venlafaxine 150 MG Extended Release Oral Capsule

 [Effexor] 10/27/2020 

12:00:00 AM EDT                                             [facility] ([facility])

 

                          24 HR venlafaxine 150 MG Extended Release Oral Capsule

 [Effexor] 10/27/2020 

12:00:00 AM EDT                                             [facility] ([facility])

 

                          24 HR venlafaxine 37.5 MG Extended Release Oral Capsul

e [Effexor] 10/27/2020 

12:00:00 AM EDT                                             [facility] ([facility])

 

                          24 HR venlafaxine 37.5 MG Extended Release Oral Capsul

e [Effexor] 10/27/2020 

12:00:00 AM EDT                                             [facility] ([facility])

 

                          24 HR venlafaxine 37.5 MG Extended Release Oral Capsul

e [Effexor] 10/27/2020 

12:00:00 AM EDT                                             [facility] ([facility])

 

                          24 HR venlafaxine 150 MG Extended Release Oral Capsule

 [Effexor] 10/27/2020 

12:00:00 AM EDT                                             [facility] ([facility])

 

                          24 HR venlafaxine 150 MG Extended Release Oral Capsule

 [Effexor] 10/27/2020 

12:00:00 AM EDT                                             [facility] ([facility])

 

                          24 HR venlafaxine 37.5 MG Extended Release Oral Capsul

e [Effexor] 10/27/2020 

12:00:00 AM EDT                                             [facility] ([facility])

 

                          24 HR venlafaxine 150 MG Extended Release Oral Capsule

 [Effexor] 10/27/2020 

12:00:00 AM EDT                                             [facility] ([facility])

 

                          24 HR venlafaxine 37.5 MG Extended Release Oral Capsul

e [Effexor] 10/27/2020 

12:00:00 AM EDT                                             [facility] ([facility])

 

                          24 HR venlafaxine 150 MG Extended Release Oral Capsule

 [Effexor] 10/27/2020 

12:00:00 AM EDT                                             [facility] ([facility])

 

                          24 HR venlafaxine 37.5 MG Extended Release Oral Capsul

e [Effexor] 10/27/2020 

12:00:00 AM EDT                                             [facility] ([facility])

 

                          24 HR venlafaxine 37.5 MG Extended Release Oral Capsul

e [Effexor] 10/27/2020 

12:00:00 AM EDT                                             [facility] ([facility])

 

                          24 HR venlafaxine 150 MG Extended Release Oral Capsule

 [Effexor] 10/27/2020 

12:00:00 AM EDT                                             [facility] ([facility])

 

                          24 HR venlafaxine 150 MG Extended Release Oral Capsule

 [Effexor] 10/27/2020 

12:00:00 AM EDT                                             [facility] ([facility])

 

                          24 HR venlafaxine 150 MG Extended Release Oral Capsule

 [Effexor] 10/27/2020 

12:00:00 AM EDT                                             [facility] ([facility])

 

                          24 HR venlafaxine 37.5 MG Extended Release Oral Capsul

e [Effexor] 10/27/2020 

12:00:00 AM EDT                                             [facility] ([facility])

 

                          24 HR venlafaxine 37.5 MG Extended Release Oral Capsul

e [Effexor] 10/27/2020 

12:00:00 AM EDT                                             [facility] ([facility])

 

                          24 HR venlafaxine 37.5 MG Extended Release Oral Capsul

e [Effexor] 10/27/2020 

12:00:00 AM EDT                                             [facility] ([facility])

 

                          24 HR venlafaxine 150 MG Extended Release Oral Capsule

 [Effexor] 10/27/2020 

12:00:00 AM EDT                                             [facility] [place] ([facility])

 

             Trazodone Hydrochloride 50 MG Oral Tablet 2020 12:00:00 AM ED

T                           

[facility] [place] ([facility])

 

             Trazodone Hydrochloride 50 MG Oral Tablet 2020 12:00:00 AM ED

T                           

[facility] [place] ([facility])

 

             Trazodone Hydrochloride 50 MG Oral Tablet 2020 12:00:00 AM ED

T                           

[facility] [place] ([facility])

 

             Trazodone Hydrochloride 50 MG Oral Tablet 2020 12:00:00 AM ED

T                           

[facility] [place] ([facility])

 

             Trazodone Hydrochloride 50 MG Oral Tablet 2020 12:00:00 AM ED

T                           

[facility] [place] ([facility])

 

             Trazodone Hydrochloride 50 MG Oral Tablet 2020 12:00:00 AM ED

T                           

[facility] [place] ([facility])

 

             Trazodone Hydrochloride 50 MG Oral Tablet 2020 12:00:00 AM ED

T                           

[facility] [place] ([facility])

 

             Trazodone Hydrochloride 50 MG Oral Tablet 2020 12:00:00 AM ED

T                           

[facility] [place] ([facility])

 

             Trazodone Hydrochloride 50 MG Oral Tablet 2020 12:00:00 AM ED

T                           

[facility] [place] ([facility])

 

             Trazodone Hydrochloride 50 MG Oral Tablet 2020 12:00:00 AM ED

T                           

[facility] [place] ([facility])

 

                          24 HR venlafaxine 150 MG Extended Release Oral Capsule

 2020 12:00:00 AM 

EDT                                                         [facility] [place] ([facility])

 

                          24 HR venlafaxine 37.5 MG Extended Release Oral Capsul

e 2020 12:00:00 AM 

EDT                                                         [facility] [place] ([facility])

 

                          24 HR venlafaxine 150 MG Extended Release Oral Capsule

 2020 12:00:00 AM 

EDT                                                         [facility] [place] ([facility])

 

                          24 HR venlafaxine 37.5 MG Extended Release Oral Capsul

e 2020 12:00:00 AM 

EDT                                                         [facility] [place] ([facility])

 

                          24 HR venlafaxine 37.5 MG Extended Release Oral Capsul

e 2020 12:00:00 AM 

EDT                                                         [facility] [place] ([facility])

 

                          24 HR venlafaxine 150 MG Extended Release Oral Capsule

 2020 12:00:00 AM 

EDT                                                         [facility] [place] ([facility])

 

                          24 HR venlafaxine 150 MG Extended Release Oral Capsule

 2020 12:00:00 AM 

EDT                                                         [facility] [place] ([facility])

 

                          24 HR venlafaxine 37.5 MG Extended Release Oral Capsul

e 2020 12:00:00 AM 

EDT                                                         [facility] [place] ([facility])

 

                          24 HR venlafaxine 37.5 MG Extended Release Oral Capsul

e 2020 12:00:00 AM 

EDT                                                         [facility] [place] ([facility])

 

                          24 HR venlafaxine 150 MG Extended Release Oral Capsule

 2020 12:00:00 AM 

EDT                                                         [facility] [place] ([facility])

 

                          24 HR venlafaxine 37.5 MG Extended Release Oral Capsul

e 2020 12:00:00 AM 

EDT                                                         [facility] [place] ([facility])

 

                          24 HR venlafaxine 150 MG Extended Release Oral Capsule

 2020 12:00:00 AM 

EDT                                                         [facility] [place] ([facility])

 

                          24 HR venlafaxine 150 MG Extended Release Oral Capsule

 2020 12:00:00 AM 

EDT                                                         [facility] [place] ([facility])

 

                          24 HR venlafaxine 37.5 MG Extended Release Oral Capsul

e 2020 12:00:00 AM 

EDT                                                         [facility] [place] ([facility])

 

                          24 HR venlafaxine 37.5 MG Extended Release Oral Capsul

e 2020 12:00:00 AM 

EDT                                                         [facility] [place] ([facility])

 

                          24 HR venlafaxine 150 MG Extended Release Oral Capsule

 2020 12:00:00 AM 

EDT                                                         [facility] [place] ([facility])

 

                          24 HR venlafaxine 37.5 MG Extended Release Oral Capsul

e 2020 12:00:00 AM 

EDT                                                         [facility] [place] ([facility])

 

                          24 HR venlafaxine 150 MG Extended Release Oral Capsule

 2020 12:00:00 AM 

EDT                                                         [facility] [place] ([facility])

 

                          24 HR venlafaxine 150 MG Extended Release Oral Capsule

 2020 12:00:00 AM 

EDT                                                         [facility] [place] ([facility])

 

                          24 HR venlafaxine 37.5 MG Extended Release Oral Capsul

e 2020 12:00:00 AM 

EDT                                                         [facility] [place] ([facility])

 

                          24 HR venlafaxine 150 MG Extended Release Oral Capsule

 2020 12:00:00 AM 

EDT                                                         [facility] [place] ([facility])

 

                          24 HR venlafaxine 150 MG Extended Release Oral Capsule

 2020 12:00:00 AM 

EDT                                                         [facility] [place] ([facility])

 

                          24 HR venlafaxine 37.5 MG Extended Release Oral Capsul

e 2020 12:00:00 AM 

EDT                                                         [facility] [place] ([facility])

 

                          24 HR venlafaxine 37.5 MG Extended Release Oral Capsul

e 2020 12:00:00 AM 

EDT                                                         [facility] [place] ([facility])

 

                          24 HR venlafaxine 150 MG Extended Release Oral Capsule

 2020 12:00:00 AM 

EDT                                                         [facility] [place] ([facility])

 

                          24 HR venlafaxine 37.5 MG Extended Release Oral Capsul

e 2020 12:00:00 AM 

EDT                                                         [facility] [place] ([facility])

 

                          24 HR venlafaxine 150 MG Extended Release Oral Capsule

 2020 12:00:00 AM 

EDT                                                         [facility] [place] ([facility])

 

                          24 HR venlafaxine 37.5 MG Extended Release Oral Capsul

e 2020 12:00:00 AM 

EDT                                                         [facility] [place] ([facility])

 

                          24 HR venlafaxine 150 MG Extended Release Oral Capsule

 2020 12:00:00 AM 

EDT                                                         [facility] [place] ([facility])

 

                          24 HR venlafaxine 37.5 MG Extended Release Oral Capsul

e 2020 12:00:00 AM 

EDT                                                         [facility] [place] ([facility])

 

                          24 HR venlafaxine 150 MG Extended Release Oral Capsule

 2020 12:00:00 AM 

EDT                                                         [facility] [place] ([facility])

 

                          24 HR venlafaxine 37.5 MG Extended Release Oral Capsul

e 2020 12:00:00 AM 

EDT                                                         [facility] [place] ([facility])

 

                          24 HR venlafaxine 37.5 MG Extended Release Oral Capsul

e 2020 12:00:00 AM 

EDT                                                         [facility] [place] ([facility])

 

                          24 HR venlafaxine 150 MG Extended Release Oral Capsule

 2020 12:00:00 AM 

EDT                                                         [facility] [place] ([facility])

 

                          24 HR venlafaxine 150 MG Extended Release Oral Capsule

 2020 12:00:00 AM 

EDT                                                         [facility] [place] ([facility])

 

                          24 HR venlafaxine 37.5 MG Extended Release Oral Capsul

e 2020 12:00:00 AM 

EDT                                                         [facility] [place] ([facility])

 

                          24 HR venlafaxine 150 MG Extended Release Oral Capsule

 2020 12:00:00 AM 

EDT                                                         [facility] [place] ([facility])

 

                          24 HR venlafaxine 37.5 MG Extended Release Oral Capsul

e 2020 12:00:00 AM 

EDT                                                         [facility] [place] ([facility])

 

                          24 HR venlafaxine 150 MG Extended Release Oral Capsule

 2020 12:00:00 AM 

EDT                                                         [facility] [place] ([facility])

 

                          24 HR venlafaxine 37.5 MG Extended Release Oral Capsul

e 2020 12:00:00 AM 

EDT                                                         [facility] [place] ([facility])

 

             Hydroxyzine Hydrochloride 25 MG Oral Tablet 2020 12:00:00 AM 

EDT                           

[facility] [place] ([facility])

 

             Hydroxyzine Hydrochloride 25 MG Oral Tablet 2020 12:00:00 AM 

EDT                           

[facility] [place] ([facility])

 

             Hydroxyzine Hydrochloride 25 MG Oral Tablet 2020 12:00:00 AM 

EDT                           

[facility] [place] ([facility])

 

             Hydroxyzine Hydrochloride 25 MG Oral Tablet 2020 12:00:00 AM 

EDT                           

[facility] [place] ([facility])

 

             Hydroxyzine Hydrochloride 25 MG Oral Tablet 2020 12:00:00 AM 

EDT                           

[facility] [place] ([facility])

 

             Hydroxyzine Hydrochloride 25 MG Oral Tablet 2020 12:00:00 AM 

EDT                           

[facility] [place] ([facility])

 

             Hydroxyzine Hydrochloride 25 MG Oral Tablet 2020 12:00:00 AM 

EDT                           

[facility] [place] ([facility])

 

             Hydroxyzine Hydrochloride 25 MG Oral Tablet 2020 12:00:00 AM 

EDT                           

[facility])

 

             Hydroxyzine Hydrochloride 25 MG Oral Tablet 2020 12:00:00 AM 

EDT                           

[facility]

 

             Hydroxyzine Hydrochloride 25 MG Oral Tablet 2020 12:00:00 AM 

EDT                           

[facility] [place] ([facility])

 

                          24 HR Bupropion Hydrochloride 300 MG Extended Release 

Oral Tablet 2020 

12:00:00 AM EDT                                             [facility] [place] ([facility])

 

             Trazodone Hydrochloride 50 MG Oral Tablet 2020 12:00:00 AM ED

T                           

[facility] [place] ([facility])

 

             Trazodone Hydrochloride 50 MG Oral Tablet 2020 12:00:00 AM ED

T                           

[facility] [place] ([facility])

 

                          24 HR Bupropion Hydrochloride 300 MG Extended Release 

Oral Tablet 2020 

12:00:00 AM EDT                                             [facility] [place] ([facility])

 

                          24 HR Bupropion Hydrochloride 300 MG Extended Release 

Oral Tablet 2020 

12:00:00 AM EDT                                             [facility] [place] ([facility])

 

             Trazodone Hydrochloride 50 MG Oral Tablet 2020 12:00:00 AM ED

T                           

[facility] [place] ([facility])

 

                          24 HR Bupropion Hydrochloride 300 MG Extended Release 

Oral Tablet 2020 

12:00:00 AM EDT                                             [facility] [place] ([facility])

 

             Trazodone Hydrochloride 50 MG Oral Tablet 2020 12:00:00 AM ED

T                           

[facility]

 

                          24 HR Bupropion Hydrochloride 300 MG Extended Release 

Oral Tablet 2020 

12:00:00 AM EDT                                             [facility] [place] ([facility])

 

             Trazodone Hydrochloride 50 MG Oral Tablet 2020 12:00:00 AM ED

T                           

[facility] [place] ([facility])

 

                          24 HR Bupropion Hydrochloride 300 MG Extended Release 

Oral Tablet 2020 

12:00:00 AM EDT                                             [facility] [place] ([facility])

 

             Trazodone Hydrochloride 50 MG Oral Tablet 2020 12:00:00 AM ED

T                           

[place] ([facility])

 

             Trazodone Hydrochloride 50 MG Oral Tablet 2020 12:00:00 AM ED

T                           

[place] ([facility])

 

                          24 HR Bupropion Hydrochloride 300 MG Extended Release 

Oral Tablet 2020 

12:00:00 AM EDT                                             [facility] ([facility])

 

             Trazodone Hydrochloride 50 MG Oral Tablet 2020 12:00:00 AM ED

T                           

[place] ([facility])

 

             Trazodone Hydrochloride 50 MG Oral Tablet 2020 12:00:00 AM ED

T                           

[place] ([facility])

 

                          24 HR Bupropion Hydrochloride 300 MG Extended Release 

Oral Tablet 2020 

12:00:00 AM EDT                                             [facility] ([facility])

 

                          24 HR Bupropion Hydrochloride 300 MG Extended Release 

Oral Tablet 2020 

12:00:00 AM EDT                                             [facility] ([facility])

 

             Trazodone Hydrochloride 50 MG Oral Tablet 2020 12:00:00 AM ED

T                           

[facility] ([facility])

 

                          24 HR Bupropion Hydrochloride 300 MG Extended Release 

Oral Tablet 2020 

12:00:00 AM EDT                                             [facility] ([facility])

 

             buspirone hydrochloride 15 MG Oral Tablet 2019 12:00:00 AM ED

T                           

[facility] ([facility])

 

             buspirone hydrochloride 15 MG Oral Tablet 2019 12:00:00 AM ED

T                           

[facility] ([facility])

 

             buspirone hydrochloride 15 MG Oral Tablet 2019 12:00:00 AM ED

T                           

[facility] ([facility])

 

             buspirone hydrochloride 15 MG Oral Tablet 2019 12:00:00 AM ED

T                           

[place] ([facility])

 

             buspirone hydrochloride 15 MG Oral Tablet 2019 12:00:00 AM ED

T                           

[place] ([facility])

 

             buspirone hydrochloride 15 MG Oral Tablet 2019 12:00:00 AM ED

T                           

[facility] ([facility])

 

             buspirone hydrochloride 15 MG Oral Tablet 2019 12:00:00 AM ED

[facility] ([facility])

 

             buspirone hydrochloride 15 MG Oral Tablet 2019 12:00:00 AM ED

T                           

[place] ([facility])

 

             buspirone hydrochloride 15 MG Oral Tablet 2019 12:00:00 AM ED

[facility] ([facility])

 

             buspirone hydrochloride 15 MG Oral Tablet 2019 12:00:00 AM ED

[facility] ([facility])

## 2021-10-16 NOTE — CCD
Transition of Care

                             Created on: 2021



Kath Ken

External Reference #: [id number]

: 1970

Sex: Female



Demographics





                          Address                   [address] [place] [address]

 

                          Home Phone                [phone]

 

                          Preferred Language        English

 

                          Marital Status            Unknown

 

                          Restorationism Affiliation     Unknown

 

                          Race                      Unknown

 

                          Additional Race(s)        Unknown



 

                          Ethnic Group              Unknown





Author





                          Author                    Kath Flaherty

 

                          [facility]

 

                          Address                   Unknown

 

                          Phone                     Unavailable







Support





                Name            Relationship    Address         Phone

 

                    Next Of Kin         Unknown             Unavailable







Care Team Providers





                    Care Team Member Name Role                Phone

 

                    Yoseph Flaherty     PCP                 Unavailable



                                                            



Allergies, Adverse Reactions, Alerts

                      



                    Allergy Substance                   Code                   C

odeSystem           

                    Reaction                   Severity                   Critic

ality        

                          Status                    Start Date                

 

                metformin                                                       

   nausea: high 

blood sugar                   Moderate                                       

Active                                                  



                                                                                
             



Medications

                      



                    Medication                   Medication Code                

   Medication 

CodeSystem                   Start Date                   Stop Date             

                Route                   Dose                   Status           

        

Fill Instructions                

 

                venlafaxine                   967782                   RxNorm   

                

2019                   oral                  

                          37.5 mg  capsule,extended release 24hr                

    completed             

                                              for 30 day(s)                

 

                venlafaxine                   [id number]                   RxNorm   

                

2020                   oral                  

                          37.5 mg  capsule,extended release 24hr                

    completed             

                                              for 30 day(s)                

 

                trazodone                   [id number]                   RxNorm     

              

2020                   oral                  

                    50 mg  tablet                    completed                  

       for 30 day(s)

               

 

                Effexor XR                   133037                   RxNorm    

               

2020-10-27                   2020                   oral                  

                          37.5 mg  capsule,extended release 24hr                

    completed             

                                              for 30 day(s)                

 

                Remeron                   [id number]                   RxNorm       

            

2021                   oral                  

                    15 mg  tablet                    completed                  

       for 30 day(s)

               

 

                Effexor XR                   [id number]                   RxNorm    

               

2019                   oral                  

                          150 mg  capsule,extended release 24hr                 

   completed              

                                              for 30 day(s)                

 

                    Wellbutrin XL                   [id number]                   RxN

orm                 

                    2019                   or

al                 

                          300 mg  tablet extended release 24 hr                 

   completed             

                                              for 30 day(s)                

 

                trazodone                   [id number]                   RxNorm     

              

2020                   oral                  

                    50 mg  tablet                    completed                  

       for 30 day(s)

               

 

                    bupropion HCl                   [id number]                   RxN

orm                 

                    2020                   or

al                 

                          300 mg  tablet extended release 24 hr                 

   completed             

                                              for 30 day(s)                

 

                venlafaxine                   925550                   RxNorm   

                

2020                   oral                  

                          37.5 mg  capsule,extended release 24hr                

    completed             

                                              for 30 day(s)                

 

                Effexor XR                   [id number]                   RxNorm    

               

2020                   oral                  

                          37.5 mg  capsule,extended release 24hr                

    completed             

                                              for 30 day(s)                

 

                    hydroxyzine HCl                   [id number]                   R

xNorm               

                    2019                   or

al               

                    25 mg  tablet                    completed                  

       for 30 

day(s)                

 

                Effexor XR                   533475                   RxNorm    

               

2021                   oral                  

                          75 mg  capsule,extended release 24hr                  

  completed               

                                              for 30 day(s)                

 

                Effexor XR                   509197                   RxNorm    

               

2018                   oral                  

                          37.5 mg  capsule,extended release 24hr                

    completed             

                                                             

 

                Effexor XR                   046492                   RxNorm    

               

2019                   2019-10-24                   oral                  

                          37.5 mg  capsule,extended release 24hr                

    completed             

                                              for 30 day(s)                

 

                Effexor XR                   664112                   RxNorm    

               

2021-02-15                   2021                   oral                  

                          75 mg  capsule,extended release 24hr                  

  completed               

                                              for 30 day(s)                

 

                    hydroxyzine HCl                   [id number]                   R

xNorm               

                    2020                   or

al               

                    25 mg  tablet                    completed                  

       for 30 

day(s)                

 

                Effexor XR                   830859                   RxNorm    

               

2019                   oral                  

                          150 mg  capsule,extended release 24hr                 

   completed              

                                              for 30 day(s)                

 

                    bupropion HCl                   [id number]                   RxN

orm                 

                    2019                   or

al                 

                          300 mg  tablet extended release 24 hr                 

   completed             

                                              for 30 day(s)                

 

                venlafaxine                   [id number]                   RxNorm   

                

2019-10-29                   2019                   oral                  

                          37.5 mg  capsule,extended release 24hr                

    completed             

                                              for 30 day(s)                

 

                    hydroxyzine HCl                   759741                   R

xNorm               

                    2020                   or

al               

                    25 mg  tablet                    completed                  

       for 30 

day(s)                

 

                    hydroxyzine HCl                   376805                   R

xNorm               

                    2020                   or

al               

                    25 mg  tablet                    completed                  

       for 45 

day(s)                

 

                venlafaxine                   836362                   RxNorm   

                

2020                   oral                  

                          37.5 mg  capsule,extended release 24hr                

    completed             

                                              for 30 day(s)                

 

                    hydroxyzine HCl                   783728                   R

xNorm               

                    2021                   or

al               

                    25 mg  tablet                    active                     

    for 30 day(s)

               

 

                Effexor XR                   782437                   RxNo    

               

2021                   2021-02-15                   oral                  

                          75 mg  capsule,extended release 24hr                  

  completed               

                                              for 30 day(s)                

 

                Effexor XR                   [id number]                   RxNo    

               

2020-10-27                   2020                   oral                  

                          150 mg  capsule,extended release 24hr                 

   completed              

                                              for 30 day(s)                

 

                    bupropion HCl                   [id number]                   RxN

orm                 

                    2020                   or

al                 

                          300 mg  tablet extended release 24 hr                 

   completed             

                                              for 30 day(s)                

 

                Effexor XR                   909613                   RxNo    

               

2019                   oral                  

                          37.5 mg  capsule,extended release 24hr                

    completed             

                                              for 30 day(s)                

 

                Effexor XR                   646060                   RxNo    

               

2021                   oral                  

                          75 mg  capsule,extended release 24hr                  

  active                  

                                              for 30 day(s)                

 

                Effexor XR                   [id number]                   RxNo    

               

2018                   oral                  

                          75 mg  capsule,extended release 24hr                  

  completed               

                                                             

 

                    Wellbutrin XL                   [id number]                   RxN

orm                 

                    2020                   or

al                 

                          300 mg  tablet extended release 24 hr                 

   active                

                                              for 30 day(s)                

 

                venlafaxine                   760331                   RxNo   

                

2020                   oral                  

                          150 mg  capsule,extended release 24hr                 

   completed              

                                              for 30 day(s)                

 

                Effexor XR                   000802                   RxNo    

               

2018                   oral                  

                          75 mg  capsule,extended release 24hr                  

  completed               

                                              for 30 day(s)                

 

                Effexor XR                   [id number]                   RxNorm    

               

2018                   oral                  

                          75 mg  capsule,extended release 24hr                  

  completed               

                                                             

 

                Effexor XR                   406775                   RxNorm    

               

2019                   oral                  

                          37.5 mg  capsule,extended release 24hr                

    completed             

                                              for 30 day(s)                

 

                Effexor XR                   650569                   RxNorm    

               

2018                   oral                  

                          75 mg  capsule,extended release 24hr                  

  completed               

                                              for 30 day(s)                

 

                Effexor XR                   616819                   RxNorm    

               

2021                   oral                  

                          150 mg  capsule,extended release 24hr                 

   completed              

                                              for 30 day(s)                

 

                buspirone                   677161                   RxNorm     

              

2019                   oral                  

                    15 mg  tablet                    completed                  

       for 90 day(s)

               

 

                Effexor XR                   636029                   RxNorm    

               

2019                   oral                  

                          37.5 mg  capsule,extended release 24hr                

    completed             

                                              for 30 day(s)                

 

                Effexor XR                   460481                   RxNorm    

               

2021                   oral                  

                          150 mg  capsule,extended release 24hr                 

   active                 

                                              for 30 day(s)                

 

                Effexor XR                   655983                   RxNorm    

               

2018                   oral                  

                          37.5 mg  capsule,extended release 24hr                

    completed             

                                                             

 

                Effexor XR                   717887                   RxNorm    

               

2020                   oral                  

                          150 mg  capsule,extended release 24hr                 

   completed              

                                              for 30 day(s)                

 

                    hydroxyzine HCl                   416198                   R

xNorm               

                    2020                   or

al               

                    25 mg  tablet                    completed                  

       for 30 

day(s)                

 

                Effexor XR                   129242                   RxNorm    

               

2019                   2019-10-24                   oral                  

                          150 mg  capsule,extended release 24hr                 

   completed              

                                              for 30 day(s)                

 

                Effexor XR                   711821                   RxNorm    

               

2019                   oral                  

                          150 mg  capsule,extended release 24hr                 

   completed              

                                              for 30 day(s)                

 

                venlafaxine                   625959                   RxNorm   

                

2020                   oral                  

                          37.5 mg  capsule,extended release 24hr                

    completed             

                                              for 30 day(s)                

 

                Effexor XR                   421878                   RxNorm    

               

2018                   oral                  

                          75 mg  capsule,extended release 24hr                  

  completed               

                                                             

 

                venlafaxine                   252135                   RxNorm   

                

2019-10-29                   2019                   oral                  

                          150 mg  capsule,extended release 24hr                 

   completed              

                                              for 30 day(s)                

 

                venlafaxine                   496242                   RxNorm   

                

2020                   2020-10-18                   oral                  

                          150 mg  capsule,extended release 24hr                 

   completed              

                                              for 30 day(s)                

 

                Effexor XR                   737602                   RxNorm    

               

2018                   oral                  

                          37.5 mg  capsule,extended release 24hr                

    completed             

                                                             

 

                Remeron                   [id number]                   RxNorm       

            

2020                   oral                  

                    15 mg  tablet                    completed                  

       for 30 day(s)

               

 

                Effexor XR                   770886                   RxNorm    

               

2018                   oral                  

                          75 mg  capsule,extended release 24hr                  

  completed               

                                                             

 

                Effexor XR                   936647                   RxNorm    

               

2018                   oral                  

                          37.5 mg  capsule,extended release 24hr                

    completed             

                                                             

 

                venlafaxine                   486421                   RxNorm   

                

2020                   oral                  

                          150 mg  capsule,extended release 24hr                 

   completed              

                                              for 30 day(s)                

 

                    hydroxyzine HCl                   971259                   R

xNorm               

                    2019                   or

al               

                    25 mg  tablet                    completed                  

       for 30 

day(s)                

 

                venlafaxine                   097839                   RxNorm   

                

2020                   oral                  

                          150 mg  capsule,extended release 24hr                 

   completed              

                                              for 30 day(s)                

 

                Effexor XR                   366468                   RxNorm    

               

2018                   oral                  

                          75 mg  capsule,extended release 24hr                  

  completed               

                                              for 30 day(s)                

 

                venlafaxine                   598475                   RxNorm   

                

2020                   oral                  

                          150 mg  capsule,extended release 24hr                 

   completed              

                                              for 30 day(s)                

 

                trazodone                   257145                   RxNorm     

              

2020                   2020-02-15                   oral                  

                    50 mg  tablet                    completed                  

       for 30 day(s)

               

 

                venlafaxine                   008360                   RxNorm   

                

2020                   oral                  

                          150 mg  capsule,extended release 24hr                 

   completed              

                                              for 30 day(s)                

 

                Remeron                   [id number]                   RxNorm       

            

2021                   oral                  

                    15 mg  tablet                    active                     

    for 30 day(s)   

            

 

                Effexor XR                   154197                   RxNorm    

               

2019                   oral                  

                          37.5 mg  capsule,extended release 24hr                

    completed             

                                              for 30 day(s)                

 

                Effexor XR                   375784                   RxNorm    

               

2020                   oral                  

                          37.5 mg  capsule,extended release 24hr                

    completed             

                                              for 30 day(s)                

 

                buspirone                   663703                   RxNorm     

              

2021                   oral                  

                    10 mg  tablet                    active                     

    for 30 day(s)   

            

 

                venlafaxine                   121643                   RxNorm   

                

2020                   oral                  

                          37.5 mg  capsule,extended release 24hr                

    completed             

                                              for 30 day(s)                

 

                Effexor XR                   118069                   RxNorm    

               

2020                   oral                  

                          150 mg  capsule,extended release 24hr                 

   completed              

                                              for 30 day(s)                

 

                venlafaxine                   909530                   RxNorm   

                

2020                   2020-10-18                   oral                  

                          37.5 mg  capsule,extended release 24hr                

    completed             

                                              for 30 day(s)                

 

                trazodone                   787915                   RxNorm     

              

2020                   oral                  

                    50 mg  tablet                    completed                  

       for 30 day(s)

               

 

                Effexor XR                   397675                   RxNorm    

               

2018                   oral                  

                          37.5 mg  capsule,extended release 24hr                

    completed             

                                                             

 

                Effexor XR                   700239                   RxNorm    

               

2019                   oral                  

                          150 mg  capsule,extended release 24hr                 

   completed              

                                              for 30 day(s)                

 

                trazodone                   910764                   RxNorm     

              

2020                   oral                  

                    50 mg  tablet                    completed                  

       for 30 day(s)

               

 

                Effexor XR                   408752                   RxNorm    

               

2019                   oral                  

                          150 mg  capsule,extended release 24hr                 

   completed              

                                              for 30 day(s)                

 

                venlafaxine                   739965                   RxNorm   

                

2019                   oral                  

                          150 mg  capsule,extended release 24hr                 

   completed              

                                              for 30 day(s)                



                                                                                
                                                                                
                                                                                
                                                                                
                                                                                
                                                                                
                                                                                
                                                                                
                                                                                
                                                                                
    



Problems

                      



                    Problem Name                   Code                   CodeSy

stem                

                    Alternate Code                   Alternate CodeSystem       

            Start 

Date                   End Date                   Status                   

Narrative                

 

                     Generalized anxiety disorder                   [id number]    

               

SNOMED-CT                                                           2021  

  

                                        Active                                  

 

 

                           Recurrent depressive disorder, current episode modera

te                   

[id number]                   SNOMED-CT                                           

 

                    2017                                       Active     

        

                                                        



                                                                                
                                           



Relevant diagnostic tests/laboratory data Narrative

          No Information                                                        
                 



Procedures

                      



                    Procedure Name                   Code                   Code

System              

                    Target Site                   Date of Procedure             

      Status    

                          Service Delivery Location                   Device Cod

e           

                          Device Name                   Device UID              

  

 

                          Psychotherapy, 45 minutes with patient                

   [id number]               

                    SNOMED-CT                    ()                   2017

                 

                          completed                   [address], [id number]  [id number]                                            

       

                                                        

 

                          Psychotherapy, 45 minutes with patient                

   [id number]               

                    SNOMED-CT                    ()                   2017

                 

                          completed                   [address], [id number]  [id number]                                            

       

                                                        

 

                          Psychotherapy, 45 minutes with patient                

   [id number]               

                    SNOMED-CT                    ()                   2017

                 

                          completed                   [address], [id number]  [id number]                                            

       

                                                        

 

                          Psychotherapy, 45 minutes with patient                

   [id number]               

                    SNOMED-CT                    ()                   2017-10-16

                 

                          completed                   [facility], [id number]  [id number]                                            

       

                                                        

 

                          Psychotherapy, 45 minutes with patient                

   [id number]               

                    SNOMED-CT                    ()                   2017-10-31

                 

                          completed                   [address], [id number]  [id number]                                            

       

                                                        

 

                          Psychotherapy, 45 minutes with patient                

   [id number]               

                    SNOMED-CT                    ()                   2017

                 

                          completed                   [address], [id number]  [id number]                                            

       

                                                        

 

                          Psychotherapy, 45 minutes with patient                

   [id number]               

                    SNOMED-CT                    ()                   2021

                 

                          completed                   [address], [id number]  [id number]                                            

       

                                                        

 

                          Psychotherapy, 45 minutes with patient                

   [id number]               

                    SNOMED-CT                    ()                   2021

                 

                          completed                   [address], [id number]  [id number]                                            

       

                                                        

 

                          Psychotherapy, 45 minutes with patient                

   [id number]               

                    SNOMED-CT                    ()                   2021

                 

                          completed                   [address], [id number]  [id number]                                            

       

                                                        

 

                          Psychotherapy, 45 minutes with patient                

   [id number]               

                    SNOMED-CT                    ()                   2021

                 

                          completed                   [address], [id number]  [id number]                                            

       

                                                        

 

                          Psychotherapy, 45 minutes with patient                

   [id number]               

                    SNOMED-CT                    ()                   2021

                 

                          completed                   [address], [id number]  [id number]                                            

       

                                                        

 

                          Psychotherapy, 45 minutes with patient                

   [id number]               

                    SNOMED-CT                    ()                   2021

                 

                          completed                   [address], [id number]  [id number]                                            

       

                                                        

 

                          Psychotherapy, 45 minutes with patient                

   [id number]               

                    SNOMED-CT                    ()                   2021

                 

                          completed                   [address], [id number]  [id number]                                            

       

                                                        

 

                          Psychotherapy, 45 minutes with patient                

   [id number]               

                    SNOMED-CT                    ()                   2021

                 

                          completed                   [address], [id number]  [id number]                                            

       

                                                        

 

                          Psychotherapy, 45 minutes with patient                

   [id number]               

                    SNOMED-CT                    ()                   2021

                 

                          completed                   [address], [id number]  [id number]                                            

       

                                                        

 

                          Psychotherapy, 45 minutes with patient                

   [id number]               

                    SNOMED-CT                    ()                   2021

                 

                          completed                   [address], [id number]  [id number]                                            

       

                                                        

 

                          Psychotherapy, 45 minutes with patient                

   [id number]               

                    SNOMED-CT                    ()                   2021

                 

                          completed                   [address], [id number]  [id number]                                            

       

                                                        

 

                          Psychotherapy, 45 minutes with patient                

   [id number]               

                    SNOMED-CT                    ()                   2021

                 

                          completed                   [address], [id number]  [id number]                                            

       

                                                        

 

                          Psychotherapy, 45 minutes with patient                

   [id number]               

                    SNOMED-CT                    ()                   2021

                 

                          completed                   [address], [id number]  [id number]                                            

       

                                                        

 

                          Psychotherapy, 45 minutes with patient                

   [id number]               

                    SNOMED-CT                    ()                   2020-10-08

                 

                          completed                   [address], [id number]  [id number]                                            

       

                                                        

 

                          Psychotherapy, 45 minutes with patient                

   [id number]               

                    SNOMED-CT                    ()                   2020-10-29

                 

                          completed                   [address], [id number]  [id number]                                            

       

                                                        

 

                          Psychotherapy, 45 minutes with patient                

   [id number]               

                    SNOMED-CT                    ()                   2020

                 

                          completed                   [address], [id number]  [id number]                                            

       

                                                        

 

                          Psychotherapy, 45 minutes with patient                

   [id number]               

                    SNOMED-CT                    ()                   2020

                 

                          completed                   [address], [id number]  [id number]                                            

       

                                                        

 

                          Psychotherapy, 45 minutes with patient                

   [id number]               

                    SNOMED-CT                    ()                   2020

                 

                          completed                   [address], [id number]  [id number]                                            

       

                                                        

 

                          Psychotherapy, 45 minutes with patient                

   [id number]               

                    SNOMED-CT                    ()                   2020

                 

                          completed                   [address], [id number]  [id number]                                            

       

                                                        

 

                          Psychotherapy, 45 minutes with patient                

   [id number]               

                    SNOMED-CT                    ()                   2020

                 

                          completed                   [address], [id number]  [id number]                                            

       

                                                        

 

                          Psychotherapy, 45 minutes with patient                

   [id number]               

                    SNOMED-CT                    ()                   2020

                 

                          completed                   [address], [id number]  [id number]                                            

       

                                                        

 

                          Psychotherapy, 45 minutes with patient                

   [id number]               

                    SNOMED-CT                    ()                   2020

                 

                          completed                   [address], [id number]  [id number]                                            

       

                                                        

 

                          Psychotherapy, 45 minutes with patient                

   [id number]               

                    SNOMED-CT                    ()                   2020

                 

                          completed                   [address], [id number]  [id number]                                            

       

                                                        

 

                          Psychotherapy, 45 minutes with patient                

   [id number]               

                    SNOMED-CT                    ()                   2020

                 

                          completed                   [address], [id number]  [id number]                                            

       

                                                        

 

                          Psychotherapy, 45 minutes with patient                

   [id number]               

                    SNOMED-CT                    ()                   2020

                 

                          completed                   [address], [id number]  [id number]                                            

       

                                                        

 

                          Psychotherapy, 45 minutes with patient                

   [id number]               

                    SNOMED-CT                    ()                   2019

                 

                          completed                   [address], [id number]  [id number]                                            

       

                                                        

 

                          Psychotherapy, 45 minutes with patient                

   [id number]               

                    SNOMED-CT                    ()                   2019-10-22

                 

                          completed                   [address], [id number]  [id number]                                            

       

                                                        

 

                          Psychotherapy, 45 minutes with patient                

   [id number]               

                    SNOMED-CT                    ()                   2019

                 

                          completed                   [address], [id number]  [id number]                                            

       

                                                        

 

                          Psychotherapy, 45 minutes with patient                

   [id number]               

                    SNOMED-CT                    ()                   2020

                 

                          completed                   [address], [id number]  [id number]                                            

       

                                                        

 

                          Psychotherapy, 45 minutes with patient                

   [id number]               

                    SNOMED-CT                    ()                   2020

                 

                          completed                   [address], [id number]  [id number]                                            

       

                                                        

 

                          Psychotherapy, 45 minutes with patient                

   [id number]               

                    SNOMED-CT                    ()                   2020

                 

                          completed                   [address], [id number]  [id number]                                            

       

                                                        

 

                          Psychotherapy, 45 minutes with patient                

   [id number]               

                    SNOMED-CT                    ()                   2019

                 

                          completed                   [address], [id number] 

[id number]                                                                      

  

  

 

                          Psychotherapy, 45 minutes with patient                

   [id number]               

                    SNOMED-CT                    ()                   2019-06-10

                 

                          completed                   [address], [id number]  [id number]                                            

       

                                                        

 

                          Psychotherapy, 45 minutes with patient                

   [id number]               

                    SNOMED-CT                    ()                   2019

                 

                          completed                   [address], [id number]  [id number]                                            

       

                                                        

 

                          Psychotherapy, 45 minutes with patient                

   [id number]               

                    SNOMED-CT                    ()                   2019

                 

                          completed                   [address], [id number]  [id number]                                            

       

                                                        

 

                          Psychotherapy, 45 minutes with patient                

   [id number]               

                    SNOMED-CT                    ()                   2019

                 

                          completed                   [address], [id number]  [id number]                                            

       

                                                        

 

                          Psychotherapy, 45 minutes with patient                

   [id number]               

                    SNOMED-CT                    ()                   2019

                 

                          completed                   [address], [id number]  [id number]                                            

       

                                                        

 

                          Psychotherapy, 45 minutes with patient                

   [id number]               

                    SNOMED-CT                    ()                   2019

                 

                          completed                   [address], [id number]  [id number]                                            

       

                                                        

 

                          Psychotherapy, 45 minutes with patient                

   [id number]               

                    SNOMED-CT                    ()                   2019

                 

                          completed                   [address], [id number]  [id number]                                            

       

                                                        

 

                          Psychotherapy, 45 minutes with patient                

   [id number]               

                    SNOMED-CT                    ()                   2019

                 

                          completed                   [address], [id number]  [id number]                                            

       

                                                        

 

                          Psychotherapy, 45 minutes with patient                

   [id number]               

                    SNOMED-CT                    ()                   2019-03-15

                 

                          completed                   [address], [id number]  [id number]                                            

       

                                                        

 

                          Psychotherapy, 45 minutes with patient                

   [id number]               

                    SNOMED-CT                    ()                   2019-04-15

                 

                          completed                   [address], [id number]  [id number]                                            

       

                                                        

 

                          Psychotherapy, 45 minutes with patient                

   [id number]               

                    SNOMED-CT                    ()                   2019

                 

                          completed                   [address], [id number]  [id number]                                            

       

                                                        

 

                          Psychotherapy, 45 minutes with patient                

   [id number]               

                    SNOMED-CT                    ()                   2018

                 

                          completed                   [address], [id number]  [id number]                                            

       

                                                        

 

                          Psychotherapy, 45 minutes with patient                

   [id number]               

                    SNOMED-CT                    ()                   2018

                 

                          completed                   [address], [id number]  [id number]                                            

       

                                                        

 

                          Psychotherapy, 45 minutes with patient                

   [id number]               

                    SNOMED-CT                    ()                   2018-10-16

                 

                          completed                   [address], [id number]  [id number]                                            

       

                                                        

 

                          Psychotherapy, 45 minutes with patient                

   [id number]               

                    SNOMED-CT                    ()                   2018

                 

                          completed                   [address], [id number]  [id number]                                            

       

                                                        

 

                          Psychotherapy, 45 minutes with patient                

   [id number]               

                    SNOMED-CT                    ()                   2018-12-10

                 

                          completed                   [address], [id number]  [id number]                                            

       

                                                        

 

                          Psychotherapy, 45 minutes with patient                

   [id number]               

                    SNOMED-CT                    ()                   2018

                 

                          completed                   [address], [id number]  [id number]                                            

       

                                                        

 

                          Psychotherapy, 45 minutes with patient                

   [id number]               

                    SNOMED-CT                    ()                   2018

                 

                          completed                   [address], [id number]  [id number]                                            

       

                                                        

 

                          Psychotherapy, 45 minutes with patient                

   [id number]               

                    SNOMED-CT                    ()                   2018

                 

                          completed                   [address], [id number]  [id number]                                            

       

                                                        

 

                          Psychotherapy, 45 minutes with patient                

   [id number]               

                    SNOMED-CT                    ()                   2018

                 

                          completed                   [address], [id number]  [id number]                                            

       

                                                        

 

                          Psychotherapy, 45 minutes with patient                

   [id number]               

                    SNOMED-CT                    ()                   2018

                 

                          completed                   [address], [id number]  [id number]                                            

       

                                                        

 

                          Psychotherapy, 45 minutes with patient                

   [id number]               

                    SNOMED-CT                    ()                   2018

                 

                          completed                   [address], [id number]  [id number]                                            

       

                                                        

 

                          Psychotherapy, 45 minutes with patient                

   [id number]               

                    SNOMED-CT                    ()                   2018

                 

                          completed                   [address], [id number]  [id number]                                            

       

                                                        

 

                          Psychotherapy, 45 minutes with patient                

   [id number]               

                    SNOMED-CT                    ()                   2017

                 

                          completed                   [address], [id number]  [id number]                                            

       

                                                        

 

                          Psychotherapy, 45 minutes with patient                

   [id number]               

                    SNOMED-CT                    ()                   2017

                 

                          completed                   [address], [id number]  [id number]                                            

       

                                                        

 

                          Psychotherapy, 45 minutes with patient                

   [id number]               

                    SNOMED-CT                    ()                   2018

                 

                          completed                   [address], [id number]  [id number]                                            

       

                                                        

 

                          Psychotherapy, 45 minutes with patient                

   [id number]               

                    SNOMED-CT                    ()                   2018

                 

                          completed                   [address], [id number]  [id number]                                            

       

                                                        

 

                          Psychotherapy, 45 minutes with patient                

   [id number]               

                    SNOMED-CT                    ()                   2018

                 

                          completed                   [address], [id number]  [id number]                                            

       

                                                        

 

                          Psychotherapy, 45 minutes with patient                

   [id number]               

                    SNOMED-CT                    ()                   2018

                 

                          completed                   [address], [id number]  [id number]                                            

       

                                                        

 

                    Initial Psychiatric Evaluation                   [id number]  

                 

SNOMED-CT                    ()                   2017                   

completed                               [address], [id number]  [id number]                                            

       

                                                        

 

                          Health Monitoring / Risk Reduction Counseling - [facility]

ed                   

[id number]                   SNOMED-CT                    ()                   

2017                   completed                   [address], [id number]  [id number]                   

                                                                    

 

                    Est. Patient - E&M Intermediate                   [id number] 

                  

SNOMED-CT                    ()                   2018                   

completed                               [address], [id number]  [id number]                                            

       

                                                        

 

                    Est. Patient - E&M Intermediate                   [id number] 

                  

SNOMED-CT                    ()                   2018                   

completed                               [address], [id number]  [id number]                                            

       

                                                        

 

                    Est. Patient - E&M Intermediate                   [id number] 

                  

SNOMED-CT                    ()                   2019-01-15                   

completed                               [address], [id number]  [id number]                                            

       

                                                        

 

                    Est. Patient - E&M Intermediate                   [id number] 

                  

SNOMED-CT                    ()                   2019                   

completed                               [address], [id number]  [id number]                                            

       

                                                        

 

                    Est. Patient - E&M Intermediate                   [id number] 

                  

SNOMED-CT                    ()                   2019                   

completed                               [address], [id number]  [id number]                                            

       

                                                        

 

                    Est. Patient - E&M Intermediate                   [id number] 

                  

SNOMED-CT                    ()                   2019                   

completed                               [address], [id number]  [id number]                                            

       

                                                        

 

                    Est. Patient - E&M Intermediate                   [id number] 

                  

SNOMED-CT                    ()                   2020                   

completed                               [address], [id number]  [id number]                                            

       

                                                        

 

                    Est. Patient - E&M Intermediate                   [id number] 

                  

SNOMED-CT                    ()                   2020                   

completed                               [address], [id number]  [id number]                                            

       

                                                        

 

                    Est. Patient - E&M Intermediate                   [id number] 

                  

SNOMED-CT                    ()                   2020                   

completed                               [address], [id number]  [id number]                                            

       

                                                        

 

                    Est. Patient - E&M Intermediate                   [id number] 

                  

SNOMED-CT                    ()                   2019                   

completed                               [address], [id number]  [id number]                                            

       

                                                        

 

                    Est. Patient - E&M Intermediate                   [id number] 

                  

SNOMED-CT                    ()                   2019                   

completed                               [address], [id number]  [id number]                                            

       

                                                        

 

                    Est. Patient - E&M Intermediate                   [id number] 

                  

SNOMED-CT                    ()                   2019                   

completed                               [address], [id number]  [id number]                                            

       

                                                        

 

                    Est. Patient - E&M Intermediate                   [id number] 

                  

SNOMED-CT                    ()                   2019                   

completed                               [address], [id number]  [id number]                                            

       

                                                        

 

                    Est. Patient - E&M Intermediate                   [id number] 

                  

SNOMED-CT                    ()                   2019-10-08                   

completed                               [address], [id number]  [id number]                                            

       

                                                        

 

                    Est. Patient - E&M Intermediate                   [id number] 

                  

SNOMED-CT                    ()                   2019                   

completed                               [address], [id number]  [id number]                                            

       

                                                        

 

                    Est. Patient - E&M Brief                   [id number]        

           SNOMED-CT

                     ()                   2018                   completed

  

                                        [address], 

[id number]  [id number]                                                           

  

             

 

                    Est. Patient - E&M Brief                   [id number]        

           SNOMED-CT

                     ()                   2019                   completed

  

                                        [address], 

[id number]  [id number]                                                           

  

             

 

                    Est. Patient - E&M Brief                   [id number]        

           SNOMED-CT

                     ()                   2020                   completed

  

                                        [address], 

[id number]  [id number]                                                           

  

             

 

                    Est. Patient - E&M Brief                   [id number]        

           SNOMED-CT

                     ()                   2020                   completed

  

                                        [address], 

[id number]  [id number]                                                           

  

             

 

                    Est. Patient - E&M Brief                   [id number]        

           SNOMED-CT

                     ()                   2020                   completed

  

                                        [address], 

[id number]  [id number]                                                           

  

             

 

                    Est. Patient - E&M Brief                   [id number]        

           SNOMED-CT

                     ()                   2020                   completed

  

                                        [address], 

[id number]  [id number]                                                           

  

             

 

                    Est. Patient - E&M Brief                   [id number]        

           SNOMED-CT

                     ()                   2020                   completed

  

                                        [address], 

[id number]  [id number]                                                           

  

             

 

                    Est. Patient - E&M Brief                   [id number]        

           SNOMED-CT

                     ()                   2021                   completed

  

                                        [address], 

760353670  [id number]                                                           

  

             

 

                    Est. Patient - E&M Brief                   [id number]        

           SNOMED-CT

                     ()                   2021-02-15                   completed

  

                                        [address], 

[id number]  [id number]                                                           

  

             

 

                    Est. Patient - E&M Expanded                   [id number]     

              

SNOMED-CT                    ()                   2018                   

completed                               [address], [id number]  [id number]                                            

       

                                                        

 

                    Est. Patient - E&M Expanded                   [id number]     

              

SNOMED-CT                    ()                   2018                   

completed                               [address], [id number]  [id number]                                            

       

                                                        

 

                    Est. Patient - E&M Expanded                   [id number]     

              

SNOMED-CT                    ()                   2018                   

completed                               [address], [id number]  [id number]                                            

       

                                                        

 

                    Est. Patient - E&M Expanded                   [id number]     

              

SNOMED-CT                    ()                   2018                   

completed                               [address], [id number]  [id number]                                            

       

                                                        

 

                    Est. Patient - E&M Expanded                   [id number]     

              

SNOMED-CT                    ()                   2018                   

completed                               [address], [id number]  [id number]                                            

       

                                                        

 

                    Est. Patient - E&M Expanded                   [id number]     

              

SNOMED-CT                    ()                   2018                   

completed                               [address], [id number]  [id number]                                            

       

                                                        

 

                    Est. Patient - E&M Expanded                   [id number]     

              

SNOMED-CT                    ()                   2018                   

completed                               [address], [id number]  [id number]                                            

       

                                                        

 

                    Est. Patient - E&M Expanded                   [id number]     

              

SNOMED-CT                    ()                   2021                   

completed                               [address], [id number]  [id number]                                            

       

                                                        

 

                    Est. Patient - E&M Expanded                   [id number]     

              

SNOMED-CT                    ()                   2021                   

completed                               [address], [id number]  [id number]                                            

       

                                                        

 

                    Est. Patient - E&M Expanded                   [id number]     

              

SNOMED-CT                    ()                   2021                   

completed                               [address], [id number]  [id number]                                            

       

                                                        

 

                    Individual Psychotherapy                   [id number]         

          SNOMED-CT 

                     ()                   2018                   completed

   

                                        [address], 

[id number]  [id number]                                                           

  

             

 

                    Individual Psychotherapy                   [id number]         

          SNOMED-CT 

                     ()                   2020                   completed

   

                                        [address], 

[id number]  [id number]                                                           

  

             

 

                    Individual Psychotherapy                   [id number]         

          SNOMED-CT 

                     ()                   2021                   completed

   

                                        [address], 

[id number]  [id number]                                                           

  

             

 

                    Individual Psychotherapy                   [id number]         

          SNOMED-CT 

                     ()                   2021-02-10                   completed

   

                                        [address], 

[id number]  [id number]                                                           

  

             

 

                    Individual Psychotherapy                   [id number]         

          SNOMED-CT 

                     ()                   2017                   completed

   

                                        [address], 

[id number]  [id number]                                                           

  

             

 

                    Individual Psychotherapy                   [id number]         

          SNOMED-CT 

                     ()                   2017                   completed

   

                                        [address], 

[id number]  [id number]                                                           

  

             

 

                    Individual Psychotherapy                   [id number]         

          SNOMED-CT 

                     ()                   2017                   completed

   

                                        [address], 

[id number]  [id number]                                                           

  

             

 

                    Individual Psychotherapy                   [id number]         

          SNOMED-CT 

                     ()                   2017-10-16                   completed

   

                                        [address], 

[id number]  [id number]                                                           

  

             

 

                    Individual Psychotherapy                   [id number]         

          SNOMED-CT 

                     ()                   2017-10-31                   completed

   

                                        [address], 

[id number]  [id number]                                                           

  

             

 

                    Individual Psychotherapy                   [id number]         

          SNOMED-CT 

                     ()                   2017                   completed

   

                                        [address], 

[id number]  [id number]                                                           

  

             

 

                    Individual Psychotherapy                   [id number]         

          SNOMED-CT 

                     ()                   2021                   completed

   

                                        [address], 

[id number]  [id number]                                                           

  

             

 

                    Individual Psychotherapy                   [id number]         

          SNOMED-CT 

                     ()                   2021                   completed

   

                                        [address], 

[id number]  [id number]                                                           

  

             

 

                    Individual Psychotherapy                   [id number]         

          SNOMED-CT 

                     ()                   2021                   completed

   

                                        [address], 

[id number]  [id number]                                                           

  

             

 

                    Individual Psychotherapy                   [id number]         

          SNOMED-CT 

                     ()                   2021                   completed

   

                                        [address], 

[id number]  [id number]                                                           

  

             

 

                    Individual Psychotherapy                   [id number]         

          SNOMED-CT 

                     ()                   2021                   completed

   

                                        [address], 

[id number]  [id number]                                                           

  

             

 

                    Individual Psychotherapy                   [id number]         

          SNOMED-CT 

                     ()                   2021                   completed

   

                                        [address], 

[id number]  [id number]                                                           

  

             

 

                    Individual Psychotherapy                   [id number]         

          SNOMED-CT 

                     ()                   2021                   completed

   

                                        [address], 

[id number]  [id number]                                                           

  

             

 

                    Individual Psychotherapy                   [id number]         

          SNOMED-CT 

                     ()                   2021                   completed

   

                                        [address], 

[id number]  [id number]                                                           

  

             

 

                    Individual Psychotherapy                   [id number]         

          SNOMED-CT 

                     ()                   2021                   completed

   

                                        [address], 

[id number]  [id number]                                                           

  

             

 

                    Individual Psychotherapy                   [id number]         

          SNOMED-CT 

                     ()                   2021                   completed

   

                                        [address], 

[id number]  [id number]                                                           

  

             

 

                    Individual Psychotherapy                   [id number]         

          SNOMED-CT 

                     ()                   2021                   completed

   

                                        [address], 

[id number]  [id number]                                                           

  

             

 

                    Individual Psychotherapy                   [id number]         

          SNOMED-CT 

                     ()                   2021                   completed

   

                                        [address], 

[id number]  [id number]                                                           

  

             

 

                    Individual Psychotherapy                   [id number]         

          SNOMED-CT 

                     ()                   2021                   completed

   

                                        [address], 

[id number]  [id number]                                                           

  

             

 

                    Individual Psychotherapy                   [id number]         

          SNOMED-CT 

                     ()                   2020-10-08                   completed

   

                                        [address], 

[id number]  [id number]                                                           

  

             

 

                    Individual Psychotherapy                   [id number]         

          SNOMED-CT 

                     ()                   2020-10-29                   completed

   

                                        [address], 

[id number]  [id number]                                                           

  

             

 

                    Individual Psychotherapy                   [id number]         

          SNOMED-CT 

                     ()                   2020                   completed

   

                                        [address], 

[id number]  [id number]                                                           

  

             

 

                    Individual Psychotherapy                   [id number]         

          SNOMED-CT 

                     ()                   2020                   completed

   

                                        [address], 

[id number]  [id number]                                                           

  

             

 

                    Individual Psychotherapy                   [id number]         

          SNOMED-CT 

                     ()                   2020                   completed

   

                                        [address], 

[id number]  [id number]                                                           

  

             

 

                    Individual Psychotherapy                   [id number]         

          SNOMED-CT 

                     ()                   2020                   completed

   

                                        [address], 

[id number]  [id number]                                                           

  

             

 

                    Individual Psychotherapy                   [id number]         

          SNOMED-CT 

                     ()                   2020                   completed

   

                                        [address], 

[id number]  [id number]                                                           

  

             

 

                    Individual Psychotherapy                   [id number]         

          SNOMED-CT 

                     ()                   2020                   completed

   

                                        [address], 

[id number]  [id number]                                                           

  

             

 

                    Individual Psychotherapy                   [id number]         

          SNOMED-CT 

                     ()                   2020                   completed

   

                                        [address], 

[id number]  [id number]                                                           

  

             

 

                    Individual Psychotherapy                   [id number]         

          SNOMED-CT 

                     ()                   2020                   completed

   

                                        [address], 

[id number]  [id number]                                                           

  

             

 

                    Individual Psychotherapy                   [id number]         

          SNOMED-CT 

                     ()                   2020                   completed

   

                                        [address], 

[id number]  [id number]                                                           

  

             

 

                    Individual Psychotherapy                   [id number]         

          SNOMED-CT 

                     ()                   2020                   completed

   

                                        [address], 

[id number]  [id number]                                                           

  

             

 

                    Individual Psychotherapy                   [id number]         

          SNOMED-CT 

                     ()                   2019                   completed

   

                                        [address], 

[id number]  [id number]                                                           

  

             

 

                    Individual Psychotherapy                   [id number]         

          SNOMED-CT 

                     ()                   2019-10-22                   completed

   

                                        [address], 

[id number]  [id number]                                                           

  

             

 

                    Individual Psychotherapy                   [id number]         

          SNOMED-CT 

                     ()                   2019                   completed

   

                                        [address], 

[id number]  [id number]                                                           

  

             

 

                    Individual Psychotherapy                   [id number]         

          SNOMED-CT 

                     ()                   2020                   completed

   

                                        [address], 

[id number]  [id number]                                                           

  

             

 

                    Individual Psychotherapy                   [id number]         

          SNOMED-CT 

                     ()                   2020                   completed

   

                                        [address], 

[id number]  [id number]                                                           

  

             

 

                    Individual Psychotherapy                   [id number]         

          SNOMED-CT 

                     ()                   2020                   completed

   

                                        [address], 

[id number]  [id number]                                                           

  

             

 

                    Individual Psychotherapy                   [id number]         

          SNOMED-CT 

                     ()                   2019                   completed

   

                                        [address],

 [id number]  [id number]   

                                                                            

 

                    Individual Psychotherapy                   [id number]         

          SNOMED-CT 

                     ()                   2019-06-10                   completed

   

                                        [address], 

[id number]  [id number]                                                           

  

             

 

                    Individual Psychotherapy                   [id number]         

          SNOMED-CT 

                     ()                   2019                   completed

   

                                        [address], 

[id number]  [id number]                                                           

  

             

 

                    Individual Psychotherapy                   [id number]         

          SNOMED-CT 

                     ()                   2019                   completed

   

                                        [address], 

[id number]  [id number]                                                           

  

             

 

                    Individual Psychotherapy                   [id number]         

          SNOMED-CT 

                     ()                   2019                   completed

   

                                        [address], 

[id number]  [id number]                                                           

  

             

 

                    Individual Psychotherapy                   [id number]         

          SNOMED-CT 

                     ()                   2019                   completed

   

                                        [address], 

[id number]  [id number]                                                           

  

             

 

                    Individual Psychotherapy                   [id number]         

          SNOMED-CT 

                     ()                   2019                   completed

   

                                        [address], 

[id number]  [id number]                                                           

  

             

 

                    Individual Psychotherapy                   [id number]         

          SNOMED-CT 

                     ()                   2019                   completed

   

                                        [address], 

[id number]  [id number]                                                           

  

             

 

                    Individual Psychotherapy                   [id number]         

          SNOMED-CT 

                     ()                   2019                   completed

   

                                        [address], 

[id number]  [id number]                                                           

  

             

 

                    Individual Psychotherapy                   [id number]         

          SNOMED-CT 

                     ()                   2019-03-15                   completed

   

                                        [address], 

[id number]  [id number]                                                           

  

             

 

                    Individual Psychotherapy                   [id number]         

          SNOMED-CT 

                     ()                   2019-04-15                   completed

   

                                        [address], 

[id number]  [id number]                                                           

  

             

 

                    Individual Psychotherapy                   [id number]         

          SNOMED-CT 

                     ()                   2019                   completed

   

                                        [address], 

[id number]  [id number]                                                           

  

             

 

                    Individual Psychotherapy                   [id number]         

          SNOMED-CT 

                     ()                   2018                   completed

   

                                        [address], 

[id number]  [id number]                                                           

  

             

 

                    Individual Psychotherapy                   [id number]         

          SNOMED-CT 

                     ()                   2018                   completed

   

                                        [address], 

[id number]  [id number]                                                           

  

             

 

                    Individual Psychotherapy                   [id number]         

          SNOMED-CT 

                     ()                   2018-10-16                   completed

   

                                        [address], 

[id number]  [id number]                                                           

  

             

 

                    Individual Psychotherapy                   [id number]         

          SNOMED-CT 

                     ()                   2018                   completed

   

                                        [address], 

[id number]  [id number]                                                           

  

             

 

                    Individual Psychotherapy                   [id number]         

          SNOMED-CT 

                     ()                   2018-12-10                   completed

   

                                        [address], 

[id number]  [id number]                                                           

  

             

 

                    Individual Psychotherapy                   [id number]         

          SNOMED-CT 

                     ()                   2018                   completed

   

                                        [address], 

[id number]  [id number]                                                           

  

             

 

                    Individual Psychotherapy                   [id number]         

          SNOMED-CT 

                     ()                   2018                   completed

   

                                        [address], 

[id number]  [id number]                                                           

  

             

 

                    Individual Psychotherapy                   [id number]         

          SNOMED-CT 

                     ()                   2018                   completed

   

                                        [address], 

[id number]  [id number]                                                           

  

             

 

                    Individual Psychotherapy                   [id number]         

          SNOMED-CT 

                     ()                   2018                   completed

   

                                        [address], 

[id number]  [id number]                                                           

  

             

 

                    Individual Psychotherapy                   [id number]         

          SNOMED-CT 

                     ()                   2018                   completed

   

                                        [address], 

[id number]  [id number]                                                           

  

             

 

                    Individual Psychotherapy                   [id number]         

          SNOMED-CT 

                     ()                   2018                   completed

   

                                        [address], 

[id number]  [id number]                                                           

  

             

 

                    Individual Psychotherapy                   [id number]         

          SNOMED-CT 

                     ()                   2018                   completed

   

                                        [address], 

[id number]  [id number]                                                           

  

             

 

                    Individual Psychotherapy                   [id number]         

          SNOMED-CT 

                     ()                   2017                   completed

   

                                        [address], 

[id number]  [id number]                                                           

  

             

 

                    Individual Psychotherapy                   [id number]         

          SNOMED-CT 

                     ()                   2017                   completed

   

                                        [address], 

[id number]  [id number]                                                           

  

             

 

                    Individual Psychotherapy                   [id number]         

          SNOMED-CT 

                     ()                   2018                   completed

   

                                        [address], 

[id number]  [id number]                                                           

  

             

 

                    Individual Psychotherapy                   [id number]         

          SNOMED-CT 

                     ()                   2018                   completed

   

                                        [address], 

[id number]  [id number]                                                           

  

             

 

                    Individual Psychotherapy                   [id number]         

          SNOMED-CT 

                     ()                   2018                   completed

   

                                        [address], 

[id number]  [id number]                                                           

  

             

 

                    Individual Psychotherapy                   [id number]         

          SNOMED-CT 

                     ()                   2018                   completed

   

                                        [address], 

[id number]  [id number]                                                           

  

             

 

                          Psychiatric Diagnostic Evaluation without medical serv

ices                   

[id number]                   SNOMED-CT                    ()                   

2017                   completed                   [address], [id number]  [id number]                   

                                                                    

 

                                                      SNOMED-CT                 

   ()           

                    2021                   completed                   [address], [id number]  [id number]           
                                                                            

 

                                                      SNOMED-CT                 

   ()           

                    2017                   completed                   [address], [id number]  [id number]           
                                                                            

 

                                                      SNOMED-CT                 

   ()           

                    2017                   completed                   [address], [id number]  [id number]           
                                                                            

 

                                                      SNOMED-CT                 

   ()           

                    2017                   completed                   [address], [id number]  [id number]           
                                                                            

 

                                                      SNOMED-CT                 

   ()           

                    2017                   completed                   [address], [id number]  [id number]           
                                                                            

 

                                                      SNOMED-CT                 

   ()           

                    2017                   completed                   [address], [id number]  [id number]           
                                                                            

 

                                                      SNOMED-CT                 

   ()           

                    2017-10-16                   completed                   [address], [id number]  [id number]           
                                                                            

 

                                                      SNOMED-CT                 

   ()           

                    2017                   completed                   [address], [id number]  [id number]           
                                                                            

 

                                                      SNOMED-CT                 

   ()           

                    2017                   completed                   [address], [id number]  [id number]           
                                                                            

 

                                                      SNOMED-CT                 

   ()           

                    2017                   completed                   [address], [id number]  [id number]           
                                                                            

 

                                                      SNOMED-CT                 

   ()           

                    2017                   completed                   [address], [id number]  [id number]           
                                                                            

 

                                                      SNOMED-CT                 

   ()           

                    2017-10-31                   completed                   [address], [id number]  [id number]           
                                                                            

 

                                                      SNOMED-CT                 

   ()           

                    2021                   completed                   [address], [id number]  [id number]           
                                                                            

 

                                                      SNOMED-CT                 

   ()           

                    2021                   completed                   [address], [id number]  [id number]           
                                                                            

 

                                                      SNOMED-CT                 

   ()           

                    2021                   completed                   [address], [id number]  [id number]           
                                                                            

 

                                                      SNOMED-CT                 

   ()           

                    2021                   completed                   [address], [id number]  [id number]           
                                                                            

 

                                                      SNOMED-CT                 

   ()           

                    2021-02-10                   completed                   [address], [id number]  [id number]           
                                                                            

 

                                                      SNOMED-CT                 

   ()           

                    2021                   completed                   [address], [id number]  [id number]           
                                                                            

 

                                                      SNOMED-CT                 

   ()           

                    2021                   completed                   [address], [id number]  [id number]           
                                                                            

 

                                                      SNOMED-CT                 

   ()           

                    2021                   completed                   [address], [id number]  [id number]           
                                                                            

 

                                                      SNOMED-CT                 

   ()           

                    2018                   completed                   [address], [id number]  [id number]           
                                                                            

 

                                                      SNOMED-CT                 

   ()           

                    2018                   completed                   [address], [id number]  [id number]           
                                                                            

 

                                                      SNOMED-CT                 

   ()           

                    2018                   completed                   [address], [id number]  [phone]           
                                                                            



                                                                                
                                                                                
                                                                                
                                                                                
                                                                                
                                                                                
                                                                                
                                                                                
                                                                                
                                                                                
                                                                                
                                                                                
                                                                                
                                                                                
                                                                                
                                                                                
                                                                                
                                                                                
                                                                                
                                                                                
                                                                                
                                                                                
                                                                                
                                                                                
                                                                                
                                       



Encounters/Encounter Diagnoses

                      



                    Encounter Name                   Encounter Code             

      Diagnosis Code

                          Diagnosis Name                   Diagnosis CodeSystem 

        

                          Date of Diagnosis                   Service Delivery L

ocation          

     

 

                          Telehealth Physchotherapy 30 Minutes with Patient     

              [id number]       

                          [id number]                   Recurrent depressive disor

dennise, current 

episode moderate                   SNOMED-CT                   2021       

                                        [facility]  [address] [place] [address], 

[phone]                  



                                                                                
                                 



Vital Signs

                      



                Code                   CodeSystem                   Vitals      

             

Date                                    Value                

 

                    8480-6                   [facility]                   Blood Press

ure-Systolic        

                          2020                   90 mm[HG]                

 

                    8462-4                   [facility]                   Blood Press

ure-Diastolic       

                          2020                   130 mm[HG]               

 

 

                8867-4                   [facility]                   Heart Rate     

              

2020                              101 /min                

 

                97463-9                   [facility]                   Weight        

           

2020                              234 [lb_av]                

 

                31097-8                   LOINC                   BMI           

        

2020                              41.45 (lb/in2)                

 

                8302-2                   LOINC                   Height         

          

2020                              63 [in_i]                



                                                                                
              



Social History

                      



                    Element Description                   Description           

        Start Date  

                    End Date                   Code                   CodeSystem

   

                                        AdditionalInfo                

 

                    SexAssignedAtBirth                   Female                 

  1970        

                                        F                   AdministrativeGender

          

                                                        



                                                                                
    



Hospital Discharge Instructions

                                    * 



                                                                                
                                    



Reason For Referral

                      





                                                                                
    



Medical Equipment

                                    *     FDA



                                                                                
                



Assessments

                                    * 



                                                                                
      



Goals Section

                      



                          Goals                     Planned DateTime            

    

 

                                        Kath will report that her level of dep

ression(irritability) is a 3 or less on 

a scale of 1 low and 10 high most days during the treatment period(90 days).    
                                        2017

## 2021-10-16 NOTE — REPVR
PROCEDURE INFORMATION: 

Exam: XR Left Tibia and Fibula 

Exam date and time: 10/16/2021 6:58 AM 

Age: 51 years old 

Clinical indication: Other: Post reduction; Additional info: Post reduction 

images 



TECHNIQUE: 

Imaging protocol: XR Left tibia and fibula. 

Views: 2 views. 



COMPARISON: 

CR Tibia, Fibula lower leg LEFT 10/16/2021 1:33 AM 



FINDINGS: 

Bones/joints: Interval reduction of the left ankle trimalleolar 

fracture/dislocation. There is residual lateral subluxation of the talus in 

respect to the tibial plafond with tilting of the talus and widening of the 

medial ankle mortise. Fracture involving the distal fibular metadiaphysis 

demonstrates mild residual displacement and angulation. Medial malleolar 

fracture of the distal tibia demonstrates mild residual displacement and 

angulation. Posterior malleolar fracture of the distal tibia demonstrates a 

small cortical step-off of the posterior articular surface. Unchanged 

appearance of the left knee, as previously described. 

Soft tissues: Generalized soft tissue swelling. 



IMPRESSION: 

Status post reduction of the left ankle trimalleolar fracture/dislocation, as 

described in detail above. 



Electronically signed by: Isrrael Stevenson On 10/16/2021  07:24:21 AM

## 2021-10-16 NOTE — CCD
Continuity of Care Document (CCD)

                             Created on: 2021



Kath Ken

External Reference #: [id number]

: 1970

Sex: Female



Demographics





                          Address                   [address] [place] [address]  [address]

 

                          Home Phone                [phone]

 

                          Preferred Language        Unknown

 

                          Marital Status            Unknown

 

                          Confucianist Affiliation     Unknown

 

                          Race                      White

 

                          Ethnic Group              Not  or 





Author





                          Author                    [facility]

 

                          Organization              Unknown

 

                          Address                   [address] [place] [address]  [id number]



 

                          Phone                     [phone]







Care Team Providers





                    Care Team Member Name Role                Phone

 

                    Yamil Vences M.D. AUTM                [phone]







Problems





                    Active Problems     Provider            Date

 

                    Ischemic stroke     Colleen Noyola M.D. Onset: 2017







Social History





                Type            Date            Description     Comments

 

                Birth Sex                       Unknown          

 

                Tobacco Use     Start: Unknown End: Unknown Patient is a former 

smoker  







Allergies, Adverse Reactions, Alerts





             Active Allergies Criticality  Reaction | Severity Comments     Date

 

             Pioglitazone Unable to assess criticality              pedal edema 

 2019

 

                                        Inactive Allergies

 

             NKDA         Unable to assess criticality                          

 2017







Medications





           Active Medications SIG        Qnty       Indications Ordering Provide

r Date

 

                          Diazepam                     5mg Tablets              

     take 1 tab 30-60 

minutes prior to mri. 1uli Noyola M.D. 2019

 

                          Baclofen                     10mg Tablets             

      Take One Tablet By 

Mouth @7Am and Take One Tablet By Mouth @1PM and Take One Tablet By Mouth @7PM 

90uli Noyola M.D. 2017

 

                          Nortriptyline HCL                     25mg Capsules   

                Take One 

Capsule By Mouth @11PM 30sanjiv Noyola M.D. 

 

                          Clopidogrel Bisulfate                     75mg Tablets

                   Take 

One Tablet By Mouth Once Daily 30uli Noyola M.D. 2017

 

                          Atorvastatin Calcium                     80mg Tablets 

                  Take One

Tablet By Mouth AT Bedtime 30uli Noyola M.D.

 2017







Immunizations





                                        Description

 

                                        No Information Available







Vital Signs





                Date            Vital           Result          Comment

 

                2021  6:16am BP Systolic     130 mmHg         

 

                    BP Diastolic        80 mmHg              

 

                    Heart Rate          80 /min              

 

                    Respiratory Rate    20 /min              

 

                2021  8:50am BP Systolic     110 mmHg         

 

                    BP Diastolic        80 mmHg              

 

                    Heart Rate          76 /min              

 

                    Respiratory Rate    16 /min              







Results





                                        Description

 

                                        No Information Available







Procedures





                Date            Code            Description     Status

 

                2021      86135           Office/Outpatient Established Mo

d MDM 30-39 Min Completed

 

                2021      66623           Injection For Nerve Block, Other

 Peripheral Nerve Or Branch 

Completed

 

                2021      32061           Inj, Anesth Agent, Trigeminal Co

mpleted

 

                    2021          19167               Injection Single Or 

Multiple Trigger Points Three Or More 

Muscles                                 Completed

 

                2021      49809           Office/Outpatient Established Mo

d MDM 30-39 Min Completed

 

                2021      47994           Injection For Nerve Block, Other

 Peripheral Nerve Or Branch 

Completed

 

                2021      69748           Inj, Anesth Agent, Trigeminal Co

mpleted

 

                    2021          87586               Injection Single Or 

Multiple Trigger Points Three Or More 

Muscles                                 Completed

 

                2021      13771           Injection For Nerve Block, Other

 Peripheral Nerve Or Branch 

Completed

 

                2021      53333           Inj, Anesth Agent, Trigeminal Co

mpleted

 

                    2021          30105               Injection Single Or 

Multiple Trigger Points Three Or More 

Muscles                                 Completed







Medical Devices





                                        Description

 

                                        No Information Available







Encounters





           Type       Date       Location   Provider   Dx         Diagnosis

 

             Office Visit 2021 11:15a Main office - [place] [facility] 

G43.719                                 Chronic migraine w/o aura, intractable, 

w/o stat migr

 

                          M54.81                    Occipital neuralgia

 

                          G44.82                    Headache associated with sex

ual activity

 

                          I63.09                    Cerebral infarction due to t

hrombosis of precerebral artery

 

                          M54.2                     Cervicalgia

 

                          G56.02                    Carpal tunnel syndrome, left

 upper limb

 

                          F45.8                     Other somatoform disorders

 

             Office Visit 2021  1:45p Main office - [place] 

M54.81                                  Occipital neuralgia

 

                          G43.719                   Chronic migraine w/o aura, i

ntractable, w/o stat migr

 

                          G44.82                    Headache associated with sex

ual activity

 

                          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

ilateral temporomandibular joint

 

                          I63.09                    Cerebral infarction due to t

hrombosis of precerebral artery

 

                          M54.2                     Cervicalgia

 

                          G56.02                    Carpal tunnel syndrome, left

 upper limb







Assessments





                Date            Code            Description     Provider

 

                2021      G43.719         Chronic migraine without aura, i

ntractable, without status m 

JHONNY [facility].A[facility]

 

                2021      M54.81          Occipital neuralgia JHONNY [facility].A[facility].

 

                2021      G44.82          Headache associated with sexual 

activity JHONNY [facility].A[facility].

 

                    2021          I63.09              Cerebral infarction 

due to thrombosis of other precerebral 

artery                                  [facility]

 

                2021      M54.2           Cervicalgia     [facility]

 [facility]

 

                2021      G56.02          Carpal tunnel syndrome, left upp

er limb JHONNY Ferrara.A.-CChelle

 

                2021      F45.8           Other somatoform disorders JHONNY [facility].A[facility].

 

                2021      G50.0           Trigeminal neuralgia Colleen arriaza M.D.

 

                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

 

                2021      M54.81          Occipital neuralgia Colleen francois M.D.

 

                2021      M54.81          Occipital neuralgia JHONNY [facility].A.-[facility].

 

                2021      G43.719         Chronic migraine without aura, i

ntractable, without status m 

JHONNY [facility]ara.A.-C.

 

                2021      G44.82          Headache associated with sexual 

activity [facility] 

[facility]

 

                2021      M26.633         Articular disc disorder of bilat

eral temporomandibular joint 

JHONNY Ferrara.A.-C.

 

                    2021          I63.09              Cerebral infarction 

due to thrombosis of other precerebral 

artery                                  [facility]

 

                2021      M54.2           Cervicalgia     JHONNY [facility].A[facility]C.

 

                2021      G56.02          Carpal tunnel syndrome, left upp

er limb [facility]

 

                2021      M54.81          Occipital neuralgia Colleen francois M.D.

 

                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

 

                2021      G50.0           Trigeminal neuralgia Colleen arriaza M.D.

 

                2021      M54.81          Occipital neuralgia Colleen francois M.D.

 

                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

 

                2021      G50.0           Trigeminal neuralgia Colleen arriaza M.D.







Plan of Treatment

Future Appointment(s):* 2021 10:30 am - [facility] at Main 
  office [facility]

2021 - [facility] G43.719 Chronic migraine without aura, 
  intractable, without status m* Comments:* Improved.





* M54.81 Occipital neuralgia* Comments:* Continue nerve blocks.





* G44.82 Headache associated with sexual activity* Comments:* Not recurrent.





* I63.09 Cerebral infarction due to thrombosis of other precerebral artery* 
  Comments:* She continues Plavix and statin.





* M54.2 Cervicalgia* Comments:* Controlled with ROM exercises and stretching.





* G56.02 Carpal tunnel syndrome, left upper limb* Comments:* Follow up with Dr Daily.





* F45.8 Other somatoform disorders* Comments:* She will monitor for teeth 
  clenching.



* Follow up:* 3 months









Functional Status





                                        Description

 

                                        No Information Available







Mental Status





                                        Description

 

                                        No Information Available







Referrals





                                        Description

 

                                        No Information Available

## 2021-10-16 NOTE — REPVR
PROCEDURE INFORMATION: 

Exam: XR Left Knee 

Exam date and time: 10/16/2021 2:23 AM 

Age: 51 years old 

Clinical indication: Other: Fall 



TECHNIQUE: 

Imaging protocol: XR Left knee. 

Views: 4 or more views. 



COMPARISON: 

No relevant prior studies available. 



FINDINGS: 

Bones/joints: Bones are intact with preservation of the tibiofemoral joint 

space. No acute fracture or dislocation. Small calcific densities along the 

medial aspect of the medial femoral condyle and medial proximal tibia 

consistent with old MCL injury. Mild degenerative change involving the 

patellofemoral joint with a high-riding patella. 

Soft tissues: Unremarkable. No significant joint effusion. 



IMPRESSION: 

1. No acute fracture or dislocation. 

2. Findings consistent with old MCL injury. 

3. Mild degenerative change involving the patellofemoral joint with a 

high-riding patella. This may be chronic, however, recommend correlation with 

integrity of the patellar tendon. 



Electronically signed by: Isrrael Stevenson On 10/16/2021  05:04:49 AM

## 2021-10-16 NOTE — CCD
Transition of Care

                             Created on: 2021



Kath Ken

External Reference #: [id number]

: 1970

Sex: Female



Demographics





                          Address                   [address] [place] [address]

 

                          Home Phone                [phone]

 

                          Preferred Language        English

 

                          Marital Status            Unknown

 

                          Sikhism Affiliation     Unknown

 

                          Race                      Unknown

 

                          Additional Race(s)        Unknown



 

                          Ethnic Group              Unknown





Author





                          Author                    Kath Flaherty

 

                          [facility]

 

                          Address                   Unknown

 

                          Phone                     Unavailable







Support





                Name            Relationship    Address         Phone

 

                    Next Of Kin         Unknown             Unavailable







Care Team Providers





                    Care Team Member Name Role                Phone

 

                    Yoseph Flaherty     PCP                 Unavailable



                                                            



Allergies, Adverse Reactions, Alerts

                      



                    Allergy Substance                   Code                   C

odeSystem           

                    Reaction                   Severity                   Critic

ality        

                          Status                    Start Date                

 

                metformin                                                       

   nausea: high 

blood sugar                   Moderate                                       

Active                                                  



                                                                                
             



Medications

                      



                    Medication                   Medication Code                

   Medication 

CodeSystem                   Start Date                   Stop Date             

                Route                   Dose                   Status           

        

Fill Instructions                

 

                Effexor XR                   690973                   RxNorm    

               

2019                   oral                  

                          150 mg  capsule,extended release 24hr                 

   completed              

                                              for 30 day(s)                

 

                venlafaxine                   [id number]                   RxNorm   

                

2019                   oral                  

                          37.5 mg  capsule,extended release 24hr                

    completed             

                                              for 30 day(s)                

 

                venlafaxine                   233084                   RxNorm   

                

2020                   oral                  

                          150 mg  capsule,extended release 24hr                 

   completed              

                                              for 30 day(s)                

 

                    hydroxyzine HCl                   [id number]                   R

xNorm               

                    2020                   or

al               

                    25 mg  tablet                    completed                  

       for 30 

day(s)                

 

                Effexor XR                   683371                   RxNorm    

               

2019                   oral                  

                          150 mg  capsule,extended release 24hr                 

   completed              

                                              for 30 day(s)                

 

                Effexor XR                   925721                   RxNorm    

               

2018                   oral                  

                          75 mg  capsule,extended release 24hr                  

  completed               

                                              for 30 day(s)                

 

                buspirone                   225304                   RxNorm     

              

2019                   oral                  

                    15 mg  tablet                    completed                  

       for 90 day(s)

               

 

                Effexor XR                   104381                   RxNorm    

               

2019                   oral                  

                          37.5 mg  capsule,extended release 24hr                

    completed             

                                              for 30 day(s)                

 

                Effexor XR                   238860                   RxNorm    

               

2020                   oral                  

                          150 mg  capsule,extended release 24hr                 

   completed              

                                              for 30 day(s)                

 

                    hydroxyzine HCl                   009553                   R

xNorm               

                    2019                   or

al               

                    25 mg  tablet                    completed                  

       for 30 

day(s)                

 

                trazodone                   876203                   RxNorm     

              

2020                   2020-02-15                   oral                  

                    50 mg  tablet                    completed                  

       for 30 day(s)

               

 

                Effexor XR                   413973                   RxNorm    

               

2019                   oral                  

                          150 mg  capsule,extended release 24hr                 

   completed              

                                              for 30 day(s)                

 

                Effexor XR                   389922                   RxNorm    

               

2021                   oral                  

                          75 mg  capsule,extended release 24hr                  

  active                  

                                              for 30 day(s)                

 

                Effexor XR                   [id number]                   RxNorm    

               

2020                   oral                  

                          150 mg  capsule,extended release 24hr                 

   completed              

                                              for 30 day(s)                

 

                venlafaxine                   647120                   RxNorm   

                

2020                   oral                  

                          150 mg  capsule,extended release 24hr                 

   completed              

                                              for 30 day(s)                

 

                venlafaxine                   810836                   RxNorm   

                

2019-10-29                   2019                   oral                  

                          150 mg  capsule,extended release 24hr                 

   completed              

                                              for 30 day(s)                

 

                venlafaxine                   350115                   RxNorm   

                

2020                   oral                  

                          37.5 mg  capsule,extended release 24hr                

    completed             

                                              for 30 day(s)                

 

                    bupropion HCl                   [id number]                   RxN

orm                 

                    2020                   or

al                 

                          300 mg  tablet extended release 24 hr                 

   completed             

                                              for 30 day(s)                

 

                Effexor XR                   094570                   RxNorm    

               

2020-10-27                   2020                   oral                  

                          150 mg  capsule,extended release 24hr                 

   completed              

                                              for 30 day(s)                

 

                Effexor XR                   635095                   RxNorm    

               

2021                   oral                  

                          150 mg  capsule,extended release 24hr                 

   active                 

                                              for 30 day(s)                

 

                Effexor XR                   133389                   RxNorm    

               

2019                   oral                  

                          37.5 mg  capsule,extended release 24hr                

    completed             

                                              for 30 day(s)                

 

                Effexor XR                   200562                   RxNorm    

               

2021                   oral                  

                          75 mg  capsule,extended release 24hr                  

  completed               

                                              for 30 day(s)                

 

                buspirone                   218834                   RxNorm     

              

2021                   oral                  

                    10 mg  tablet                    active                     

    for 30 day(s)   

            

 

                venlafaxine                   112285                   RxNorm   

                

2020                   oral                  

                          37.5 mg  capsule,extended release 24hr                

    completed             

                                              for 30 day(s)                

 

                Effexor XR                   182279                   RxNorm    

               

2020-10-27                   2020                   oral                  

                          37.5 mg  capsule,extended release 24hr                

    completed             

                                              for 30 day(s)                

 

                trazodone                   401022                   RxNorm     

              

2020                   oral                  

                    50 mg  tablet                    completed                  

       for 30 day(s)

               

 

                venlafaxine                   [id number]                   RxNorm   

                

2020                   oral                  

                          37.5 mg  capsule,extended release 24hr                

    completed             

                                              for 30 day(s)                

 

                    Wellbutrin XL                   [id number]                   RxN

orm                 

                    2019                   or

al                 

                          300 mg  tablet extended release 24 hr                 

   completed             

                                              for 30 day(s)                

 

                venlafaxine                   493800                   RxNo   

                

2019-10-29                   2019                   oral                  

                          37.5 mg  capsule,extended release 24hr                

    completed             

                                              for 30 day(s)                

 

                Effexor XR                   [id number]                   RxNo    

               

2020                   oral                  

                          37.5 mg  capsule,extended release 24hr                

    completed             

                                              for 30 day(s)                

 

                Effexor XR                   826895                   RxNo    

               

2019                   oral                  

                          150 mg  capsule,extended release 24hr                 

   completed              

                                              for 30 day(s)                

 

                venlafaxine                   476976                   RxNo   

                

2020                   oral                  

                          150 mg  capsule,extended release 24hr                 

   completed              

                                              for 30 day(s)                

 

                Effexor XR                   640782                   RxNorm    

               

2021                   oral                  

                          150 mg  capsule,extended release 24hr                 

   completed              

                                              for 30 day(s)                

 

                Effexor XR                   633445                   RxNorm    

               

2018                   oral                  

                          75 mg  capsule,extended release 24hr                  

  completed               

                                              for 30 day(s)                

 

                Effexor XR                   034344                   RxNorm    

               

2019                   oral                  

                          37.5 mg  capsule,extended release 24hr                

    completed             

                                              for 30 day(s)                

 

                Effexor XR                   236587                   RxNorm    

               

2018                   oral                  

                          37.5 mg  capsule,extended release 24hr                

    completed             

                                                             

 

                    bupropion HCl                   [id number]                   RxN

orm                 

                    2020                   or

al                 

                          300 mg  tablet extended release 24 hr                 

   completed             

                                              for 30 day(s)                

 

                    hydroxyzine HCl                   [id number]                   R

xNorm               

                    2020                   or

al               

                    25 mg  tablet                    completed                  

       for 45 

day(s)                

 

                Effexor XR                   257599                   RxNorm    

               

2018                   oral                  

                          37.5 mg  capsule,extended release 24hr                

    completed             

                                                             

 

                Remeron                   [id number]                   RxNorm       

            

2020                   oral                  

                    15 mg  tablet                    completed                  

       for 30 day(s)

               

 

                Effexor XR                   134095                   RxNorm    

               

2021                   2021-02-15                   oral                  

                          75 mg  capsule,extended release 24hr                  

  completed               

                                              for 30 day(s)                

 

                venlafaxine                   632759                   RxNorm   

                

2020                   oral                  

                          150 mg  capsule,extended release 24hr                 

   completed              

                                              for 30 day(s)                

 

                Effexor XR                   670269                   RxNorm    

               

2018                   oral                  

                          75 mg  capsule,extended release 24hr                  

  completed               

                                                             

 

                venlafaxine                   241129                   RxNorm   

                

2020                   2020-10-18                   oral                  

                          150 mg  capsule,extended release 24hr                 

   completed              

                                              for 30 day(s)                

 

                Effexor XR                   401923                   RxNorm    

               

2019                   oral                  

                          37.5 mg  capsule,extended release 24hr                

    completed             

                                              for 30 day(s)                

 

                Effexor XR                   017446                   RxNorm    

               

2019                   2019-10-24                   oral                  

                          37.5 mg  capsule,extended release 24hr                

    completed             

                                              for 30 day(s)                

 

                    bupropion HCl                   [id number]                   RxN

orm                 

                    2019                   or

al                 

                          300 mg  tablet extended release 24 hr                 

   completed             

                                              for 30 day(s)                

 

                Effexor XR                   720213                   RxNorm    

               

2018                   oral                  

                          75 mg  capsule,extended release 24hr                  

  completed               

                                              for 30 day(s)                

 

                Effexor XR                   971358                   RxNorm    

               

2020                   oral                  

                          37.5 mg  capsule,extended release 24hr                

    completed             

                                              for 30 day(s)                

 

                    hydroxyzine HCl                   859409                   R

xNorm               

                    2021                   or

al               

                    25 mg  tablet                    active                     

    for 30 day(s)

               

 

                trazodone                   999464                   RxNorm     

              

2020                   oral                  

                    50 mg  tablet                    completed                  

       for 30 day(s)

               

 

                venlafaxine                   673577                   RxNorm   

                

2020                   oral                  

                          37.5 mg  capsule,extended release 24hr                

    completed             

                                              for 30 day(s)                

 

                Remeron                   148587                   RxNorm       

            

2021                   oral                  

                    15 mg  tablet                    active                     

    for 30 day(s)   

            

 

                    hydroxyzine HCl                   [id number]                   R

xNorm               

                    2020                   or

al               

                    25 mg  tablet                    completed                  

       for 30 

day(s)                

 

                Effexor XR                   372813                   RxNorm    

               

2018                   oral                  

                          75 mg  capsule,extended release 24hr                  

  completed               

                                                             

 

                Effexor XR                   138827                   RxNorm    

               

2018                   oral                  

                          37.5 mg  capsule,extended release 24hr                

    completed             

                                                             

 

                venlafaxine                   841035                   RxNorm   

                

2020                   oral                  

                          150 mg  capsule,extended release 24hr                 

   completed              

                                              for 30 day(s)                

 

                Effexor XR                   130219                   RxNorm    

               

2018                   oral                  

                          75 mg  capsule,extended release 24hr                  

  completed               

                                                             

 

                venlafaxine                   989129                   RxNorm   

                

2020                   2020-10-18                   oral                  

                          37.5 mg  capsule,extended release 24hr                

    completed             

                                              for 30 day(s)                

 

                    hydroxyzine HCl                   [id number]                   R

xNorm               

                    2020                   or

al               

                    25 mg  tablet                    completed                  

       for 30 

day(s)                

 

                trazodone                   060610                   RxNorm     

              

2020                   oral                  

                    50 mg  tablet                    completed                  

       for 30 day(s)

               

 

                venlafaxine                   439208                   RxNorm   

                

2019                   oral                  

                          150 mg  capsule,extended release 24hr                 

   completed              

                                              for 30 day(s)                

 

                venlafaxine                   572913                   RxNorm   

                

2020                   oral                  

                          37.5 mg  capsule,extended release 24hr                

    completed             

                                              for 30 day(s)                

 

                trazodone                   208360                   RxNorm     

              

2020                   oral                  

                    50 mg  tablet                    completed                  

       for 30 day(s)

               

 

                Effexor XR                   359314                   RxNorm    

               

2018                   oral                  

                          37.5 mg  capsule,extended release 24hr                

    completed             

                                                             

 

                Effexor XR                   961925                   RxNorm    

               

2018                   oral                  

                          37.5 mg  capsule,extended release 24hr                

    completed             

                                                             

 

                Effexor XR                   174821                   RxNorm    

               

2019                   2019-10-24                   oral                  

                          150 mg  capsule,extended release 24hr                 

   completed              

                                              for 30 day(s)                

 

                Effexor XR                   016432                   RxNorm    

               

2019                   oral                  

                          150 mg  capsule,extended release 24hr                 

   completed              

                                              for 30 day(s)                

 

                Remeron                   482713                   RxNorm       

            

2021                   oral                  

                    15 mg  tablet                    completed                  

       for 30 day(s)

               

 

                Effexor XR                   620428                   RxNorm    

               

2018                   oral                  

                          75 mg  capsule,extended release 24hr                  

  completed               

                                                             

 

                    hydroxyzine HCl                   303002                   R

xNorm               

                    2019                   or

al               

                    25 mg  tablet                    completed                  

       for 30 

day(s)                

 

                    Wellbutrin XL                   960416                   RxN

orm                 

                    2020                   or

al                 

                          300 mg  tablet extended release 24 hr                 

   active                

                                              for 30 day(s)                

 

                Effexor XR                   018591                   RxNorm    

               

2021-02-15                   2021                   oral                  

                          75 mg  capsule,extended release 24hr                  

  completed               

                                              for 30 day(s)                



                                                                                
                                                                                
                                                                                
                                                                                
                                                                                
                                                                                
                                                                                
                                                                                
                                                                                
                                                                                
    



Problems

                      



                    Problem Name                   Code                   CodeSy

stem                

                    Alternate Code                   Alternate CodeSystem       

            Start 

Date                   End Date                   Status                   

Narrative                

 

                           Recurrent depressive disorder, current episode modera

te                   

[id number]                   SNOMED-CT                                           

 

                    2017                                       Active     

        

                                                        

 

                     Generalized anxiety disorder                   [id number]    

               

SNOMED-CT                                                           2021  

  

                                        Active                                  

 



                                                                                
                                           



Relevant diagnostic tests/laboratory data Narrative

          No Information                                                        
                 



Procedures

                      



                    Procedure Name                   Code                   Code

System              

                    Target Site                   Date of Procedure             

      Status    

                          Service Delivery Location                   Device Cod

e           

                          Device Name                   Device UID              

  

 

                          Psychotherapy, 45 minutes with patient                

   [id number]               

                    SNOMED-CT                    ()                   2017

                 

                          completed                   [address], [id number]  [id number]                                            

       

                                                        

 

                          Psychotherapy, 45 minutes with patient                

   [id number]               

                    SNOMED-CT                    ()                   2017

                 

                          completed                   [address], [id number]  [id number]                                            

       

                                                        

 

                          Psychotherapy, 45 minutes with patient                

   [id number]               

                    SNOMED-CT                    ()                   2017

                 

                          completed                   [address], [id number]  [id number]                                            

       

                                                        

 

                          Psychotherapy, 45 minutes with patient                

   [id number]               

                    SNOMED-CT                    ()                   2017-10-16

                 

                          completed                   [facility], [id number]  [id number]                                            

       

                                                        

 

                          Psychotherapy, 45 minutes with patient                

   [id number]               

                    SNOMED-CT                    ()                   2017-10-31

                 

                          completed                   [address], [id number]  [id number]                                            

       

                                                        

 

                          Psychotherapy, 45 minutes with patient                

   [id number]               

                    SNOMED-CT                    ()                   2017

                 

                          completed                   [address], [id number]  [id number]                                            

       

                                                        

 

                          Psychotherapy, 45 minutes with patient                

   [id number]               

                    SNOMED-CT                    ()                   2021

                 

                          completed                   [address], [id number]  [id number]                                            

       

                                                        

 

                          Psychotherapy, 45 minutes with patient                

   [id number]               

                    SNOMED-CT                    ()                   2021

                 

                          completed                   [address], [id number]  [id number]                                            

       

                                                        

 

                          Psychotherapy, 45 minutes with patient                

   [id number]               

                    SNOMED-CT                    ()                   2021

                 

                          completed                   [address], [id number]  [phone]                                            

       

                                                        

 

                          Psychotherapy, 45 minutes with patient                

   [id number]               

                    SNOMED-CT                    ()                   2021

                 

                          completed                   [address], [id number]  [id number]                                            

       

                                                        

 

                          Psychotherapy, 45 minutes with patient                

   [id number]               

                    SNOMED-CT                    ()                   2021

                 

                          completed                   [address], [id number]  [id number]                                            

       

                                                        

 

                          Psychotherapy, 45 minutes with patient                

   [id number]               

                    SNOMED-CT                    ()                   2021

                 

                          completed                   [address], [id number]  [id number]                                            

       

                                                        

 

                          Psychotherapy, 45 minutes with patient                

   [id number]               

                    SNOMED-CT                    ()                   2021

                 

                          completed                   [address], [id number]  [id number]                                            

       

                                                        

 

                          Psychotherapy, 45 minutes with patient                

   [id number]               

                    SNOMED-CT                    ()                   2021

                 

                          completed                   [address], [id number]  [id number]                                            

       

                                                        

 

                          Psychotherapy, 45 minutes with patient                

   [id number]               

                    SNOMED-CT                    ()                   2021

                 

                          completed                   [address], [id number]  [id number]                                            

       

                                                        

 

                          Psychotherapy, 45 minutes with patient                

   [id number]               

                    SNOMED-CT                    ()                   2021

                 

                          completed                   [address], [id number]  [id number]                                            

       

                                                        

 

                          Psychotherapy, 45 minutes with patient                

   [id number]               

                    SNOMED-CT                    ()                   2021

                 

                          completed                   [address], [id number]  [id number]                                            

       

                                                        

 

                          Psychotherapy, 45 minutes with patient                

   [id number]               

                    SNOMED-CT                    ()                   2021

                 

                          completed                   [address], [id number]  [id number]                                            

       

                                                        

 

                          Psychotherapy, 45 minutes with patient                

   [id number]               

                    SNOMED-CT                    ()                   2020-10-08

                 

                          completed                   [address], [id number]  [id number]                                            

       

                                                        

 

                          Psychotherapy, 45 minutes with patient                

   [id number]               

                    SNOMED-CT                    ()                   2020-10-29

                 

                          completed                   [address], [id number]  [id number]                                            

       

                                                        

 

                          Psychotherapy, 45 minutes with patient                

   [id number]               

                    SNOMED-CT                    ()                   2020

                 

                          completed                   [address], [id number]  [id number]                                            

       

                                                        

 

                          Psychotherapy, 45 minutes with patient                

   [id number]               

                    SNOMED-CT                    ()                   2020

                 

                          completed                   [address], [id number]  [id number]                                            

       

                                                        

 

                          Psychotherapy, 45 minutes with patient                

   [id number]               

                    SNOMED-CT                    ()                   2020

                 

                          completed                   [address], [id number]  [id number]                                            

       

                                                        

 

                          Psychotherapy, 45 minutes with patient                

   [id number]               

                    SNOMED-CT                    ()                   2020

                 

                          completed                   [address], [id number]  [id number]                                            

       

                                                        

 

                          Psychotherapy, 45 minutes with patient                

   [id number]               

                    SNOMED-CT                    ()                   2020

                 

                          completed                   [address], [id number]  [id number]                                            

       

                                                        

 

                          Psychotherapy, 45 minutes with patient                

   [id number]               

                    SNOMED-CT                    ()                   2020

                 

                          completed                   [address], [id number]  [id number]                                            

       

                                                        

 

                          Psychotherapy, 45 minutes with patient                

   [id number]               

                    SNOMED-CT                    ()                   2020

                 

                          completed                   [address], [id number]  [id number]                                            

       

                                                        

 

                          Psychotherapy, 45 minutes with patient                

   [id number]               

                    SNOMED-CT                    ()                   2020

                 

                          completed                   [address], [id number]  [id number]                                            

       

                                                        

 

                          Psychotherapy, 45 minutes with patient                

   [id number]               

                    SNOMED-CT                    ()                   2020

                 

                          completed                   [address], [id number]  [id number]                                            

       

                                                        

 

                          Psychotherapy, 45 minutes with patient                

   [id number]               

                    SNOMED-CT                    ()                   2020

                 

                          completed                   [address], [id number]  [id number]                                            

       

                                                        

 

                          Psychotherapy, 45 minutes with patient                

   [id number]               

                    SNOMED-CT                    ()                   2019

                 

                          completed                   [address], [id number]  [id number]                                            

       

                                                        

 

                          Psychotherapy, 45 minutes with patient                

   [id number]               

                    SNOMED-CT                    ()                   2019-10-22

                 

                          completed                   [address], [id number]  [id number]                                            

       

                                                        

 

                          Psychotherapy, 45 minutes with patient                

   [id number]               

                    SNOMED-CT                    ()                   2019

                 

                          completed                   [address], [id number]  [id number]                                            

       

                                                        

 

                          Psychotherapy, 45 minutes with patient                

   [id number]               

                    SNOMED-CT                    ()                   2020

                 

                          completed                   [address], [id number]  [id number]                                            

       

                                                        

 

                          Psychotherapy, 45 minutes with patient                

   [id number]               

                    SNOMED-CT                    ()                   2020

                 

                          completed                   [address], [id number]  [id number]                                            

       

                                                        

 

                          Psychotherapy, 45 minutes with patient                

   [id number]               

                    SNOMED-CT                    ()                   2020

                 

                          completed                   [address], [id number]  [id number]                                            

       

                                                        

 

                          Psychotherapy, 45 minutes with patient                

   [id number]               

                    SNOMED-CT                    ()                   2019

                 

                          completed                   [address], [id number] 

[id number]                                                                      

  

  

 

                          Psychotherapy, 45 minutes with patient                

   [id number]               

                    SNOMED-CT                    ()                   2019-06-10

                 

                          completed                   [address], [id number]  [id number]                                            

       

                                                        

 

                          Psychotherapy, 45 minutes with patient                

   [id number]               

                    SNOMED-CT                    ()                   2019

                 

                          completed                   [address], [id number]  [id number]                                            

       

                                                        

 

                          Psychotherapy, 45 minutes with patient                

   [id number]               

                    SNOMED-CT                    ()                   2019

                 

                          completed                   [address], [id number]  [id number]                                            

       

                                                        

 

                          Psychotherapy, 45 minutes with patient                

   [id number]               

                    SNOMED-CT                    ()                   2019

                 

                          completed                   [address], [id number]  [id number]                                            

       

                                                        

 

                          Psychotherapy, 45 minutes with patient                

   [id number]               

                    SNOMED-CT                    ()                   2019

                 

                          completed                   [address], [id number]  [id number]                                            

       

                                                        

 

                          Psychotherapy, 45 minutes with patient                

   [id number]               

                    SNOMED-CT                    ()                   2019

                 

                          completed                   [address], [id number]  [id number]                                            

       

                                                        

 

                          Psychotherapy, 45 minutes with patient                

   [id number]               

                    SNOMED-CT                    ()                   2019

                 

                          completed                   [address], [id number]  [id number]                                            

       

                                                        

 

                          Psychotherapy, 45 minutes with patient                

   [id number]               

                    SNOMED-CT                    ()                   2019

                 

                          completed                   [address], [id number]  [id number]                                            

       

                                                        

 

                          Psychotherapy, 45 minutes with patient                

   [id number]               

                    SNOMED-CT                    ()                   2019-03-15

                 

                          completed                   [address], [id number]  [id number]                                            

       

                                                        

 

                          Psychotherapy, 45 minutes with patient                

   [id number]               

                    SNOMED-CT                    ()                   2019-04-15

                 

                          completed                   [address], [id number]  [id number]                                            

       

                                                        

 

                          Psychotherapy, 45 minutes with patient                

   [id number]               

                    SNOMED-CT                    ()                   2019

                 

                          completed                   [address], [id number]  [id number]                                            

       

                                                        

 

                          Psychotherapy, 45 minutes with patient                

   [id number]               

                    SNOMED-CT                    ()                   2018

                 

                          completed                   [address], [id number]  [id number]                                            

       

                                                        

 

                          Psychotherapy, 45 minutes with patient                

   [id number]               

                    SNOMED-CT                    ()                   2018

                 

                          completed                   [address], [id number]  [id number]                                            

       

                                                        

 

                          Psychotherapy, 45 minutes with patient                

   [id number]               

                    SNOMED-CT                    ()                   2018-10-16

                 

                          completed                   [address], [id number]  [id number]                                            

       

                                                        

 

                          Psychotherapy, 45 minutes with patient                

   [id number]               

                    SNOMED-CT                    ()                   2018

                 

                          completed                   [address], [id number]  [id number]                                            

       

                                                        

 

                          Psychotherapy, 45 minutes with patient                

   [id number]               

                    SNOMED-CT                    ()                   2018-12-10

                 

                          completed                   [address], [id number]  [id number]                                            

       

                                                        

 

                          Psychotherapy, 45 minutes with patient                

   [id number]               

                    SNOMED-CT                    ()                   2018

                 

                          completed                   [address], [id number]  [id number]                                            

       

                                                        

 

                          Psychotherapy, 45 minutes with patient                

   [id number]               

                    SNOMED-CT                    ()                   2018

                 

                          completed                   [address], [id number]  [id number]                                            

       

                                                        

 

                          Psychotherapy, 45 minutes with patient                

   [id number]               

                    SNOMED-CT                    ()                   2018

                 

                          completed                   [address], [id number]  [id number]                                            

       

                                                        

 

                          Psychotherapy, 45 minutes with patient                

   [id number]               

                    SNOMED-CT                    ()                   2018

                 

                          completed                   [address], [id number]  [id number]                                            

       

                                                        

 

                          Psychotherapy, 45 minutes with patient                

   [id number]               

                    SNOMED-CT                    ()                   2018

                 

                          completed                   [address], [id number]  [id number]                                            

       

                                                        

 

                          Psychotherapy, 45 minutes with patient                

   [id number]               

                    SNOMED-CT                    ()                   2018

                 

                          completed                   [address], [id number]  [id number]                                            

       

                                                        

 

                          Psychotherapy, 45 minutes with patient                

   [id number]               

                    SNOMED-CT                    ()                   2018

                 

                          completed                   [address], [id number]  [id number]                                            

       

                                                        

 

                          Psychotherapy, 45 minutes with patient                

   [id number]               

                    SNOMED-CT                    ()                   2017

                 

                          completed                   [address], [id number]  [id number]                                            

       

                                                        

 

                          Psychotherapy, 45 minutes with patient                

   [id number]               

                    SNOMED-CT                    ()                   2017

                 

                          completed                   [address], [id number]  [phone]                                            

       

                                                        

 

                          Psychotherapy, 45 minutes with patient                

   [id number]               

                    SNOMED-CT                    ()                   2018

                 

                          completed                   [address], [id number]  [id number]                                            

       

                                                        

 

                          Psychotherapy, 45 minutes with patient                

   [id number]               

                    SNOMED-CT                    ()                   2018

                 

                          completed                   [address], [id number]  [id number]                                            

       

                                                        

 

                          Psychotherapy, 45 minutes with patient                

   [id number]               

                    SNOMED-CT                    ()                   2018

                 

                          completed                   [address], 505233463  [id number]                                            

       

                                                        

 

                          Psychotherapy, 45 minutes with patient                

   [id number]               

                    SNOMED-CT                    ()                   2018

                 

                          completed                   [address], [id number]  [id number]                                            

       

                                                        

 

                    Initial Psychiatric Evaluation                   [id number]  

                 

SNOMED-CT                    ()                   2017                   

completed                               [address], [id number]  [id number]                                            

       

                                                        

 

                          Health Monitoring / Risk Reduction Counseling - [facility]

ed                   

[id number]                   SNOMED-CT                    ()                   

2017                   completed                   [address], [id number]  [id number]                   

                                                                    

 

                    Est. Patient - E&M Intermediate                   [id number] 

                  

SNOMED-CT                    ()                   2018                   

completed                               [address], [id number]  [id number]                                            

       

                                                        

 

                    Est. Patient - E&M Intermediate                   [id number] 

                  

SNOMED-CT                    ()                   2018                   

completed                               [address], [id number]  [id number]                                            

       

                                                        

 

                    Est. Patient - E&M Intermediate                   [id number] 

                  

SNOMED-CT                    ()                   2019-01-15                   

completed                               [address], [id number]  [id number]                                            

       

                                                        

 

                    Est. Patient - E&M Intermediate                   [id number] 

                  

SNOMED-CT                    ()                   2019                   

completed                               [address], [id number]  [id number]                                            

       

                                                        

 

                    Est. Patient - E&M Intermediate                   [id number] 

                  

SNOMED-CT                    ()                   2019                   

completed                               [address], [id number]  [id number]                                            

       

                                                        

 

                    Est. Patient - E&M Intermediate                   [id number] 

                  

SNOMED-CT                    ()                   2019                   

completed                               [address], [id number]  [id number]                                            

       

                                                        

 

                    Est. Patient - E&M Intermediate                   [id number] 

                  

SNOMED-CT                    ()                   2020                   

completed                               [address], [id number]  [id number]                                            

       

                                                        

 

                    Est. Patient - E&M Intermediate                   [id number] 

                  

SNOMED-CT                    ()                   2020                   

completed                               [address], [id number]  [id number]                                            

       

                                                        

 

                    Est. Patient - E&M Intermediate                   [id number] 

                  

SNOMED-CT                    ()                   2020                   

completed                               [address], [id number]  [id number]                                            

       

                                                        

 

                    Est. Patient - E&M Intermediate                   [id number] 

                  

SNOMED-CT                    ()                   2019                   

completed                               [address], [id number]  [id number]                                            

       

                                                        

 

                    Est. Patient - E&M Intermediate                   [id number] 

                  

SNOMED-CT                    ()                   2019                   

completed                               [address], [id number]  [id number]                                            

       

                                                        

 

                    Est. Patient - E&M Intermediate                   [id number] 

                  

SNOMED-CT                    ()                   2019                   

completed                               [address], [id number]  [id number]                                            

       

                                                        

 

                    Est. Patient - E&M Intermediate                   [id number] 

                  

SNOMED-CT                    ()                   2019                   

completed                               [address], [id number]  [id number]                                            

       

                                                        

 

                    Est. Patient - E&M Intermediate                   [id number] 

                  

SNOMED-CT                    ()                   2019-10-08                   

completed                               [address], [id number]  [id number]                                            

       

                                                        

 

                    Est. Patient - E&M Intermediate                   [id number] 

                  

SNOMED-CT                    ()                   2019                   

completed                               [address], [id number]  [id number]                                            

       

                                                        

 

                    Est. Patient - E&M Brief                   [id number]        

           SNOMED-CT

                     ()                   2018                   completed

  

                                        [address], 

[id number]  [id number]                                                           

  

             

 

                    Est. Patient - E&M Brief                   [id number]        

           SNOMED-CT

                     ()                   2019                   completed

  

                                        [address], 

[id number]  [id number]                                                           

  

             

 

                    Est. Patient - E&M Brief                   [id number]        

           SNOMED-CT

                     ()                   2020                   completed

  

                                        [address], 

[id number]  [id number]                                                           

  

             

 

                    Est. Patient - E&M Brief                   [id number]        

           SNOMED-CT

                     ()                   2020                   completed

  

                                        [address], 

[id number]  [id number]                                                           

  

             

 

                    Est. Patient - E&M Brief                   [id number]        

           SNOMED-CT

                     ()                   2020                   completed

  

                                        [address], 

473144073  [id number]                                                           

  

             

 

                    Est. Patient - E&M Brief                   [id number]        

           SNOMED-CT

                     ()                   2020                   completed

  

                                        [address], 

[id number]  [id number]                                                           

  

             

 

                    Est. Patient - E&M Brief                   [id number]        

           SNOMED-CT

                     ()                   2020                   completed

  

                                        [address], 

[id number]  [id number]                                                           

  

             

 

                    Est. Patient - E&M Brief                   [id number]        

           SNOMED-CT

                     ()                   2021                   completed

  

                                        [address], 

[id number]  [id number]                                                           

  

             

 

                    Est. Patient - E&M Brief                   [id number]        

           SNOMED-CT

                     ()                   2021-02-15                   completed

  

                                        [address], 

852593870  [id number]                                                           

  

             

 

                    Est. Patient - E&M Expanded                   [id number]     

              

SNOMED-CT                    ()                   2018                   

completed                               [address], [id number]  [id number]                                            

       

                                                        

 

                    Est. Patient - E&M Expanded                   [id number]     

              

SNOMED-CT                    ()                   2018                   

completed                               [address], [id number]  [id number]                                            

       

                                                        

 

                    Est. Patient - E&M Expanded                   [id number]     

              

SNOMED-CT                    ()                   2018                   

completed                               [address], [id number]  [id number]                                            

       

                                                        

 

                    Est. Patient - E&M Expanded                   [id number]     

              

SNOMED-CT                    ()                   2018                   

completed                               [address], [id number]  [id number]                                            

       

                                                        

 

                    Est. Patient - E&M Expanded                   [id number]     

              

SNOMED-CT                    ()                   2018                   

completed                               [address], [id number]  [id number]                                            

       

                                                        

 

                    Est. Patient - E&M Expanded                   [id number]     

              

SNOMED-CT                    ()                   2018                   

completed                               [address], [id number]  [id number]                                            

       

                                                        

 

                    Est. Patient - E&M Expanded                   [id number]     

              

SNOMED-CT                    ()                   2018                   

completed                               [address], [id number]  [id number]                                            

       

                                                        

 

                    Est. Patient - E&M Expanded                   [id number]     

              

SNOMED-CT                    ()                   2021                   

completed                               [address], [id number]  [id number]                                            

       

                                                        

 

                    Est. Patient - E&M Expanded                   [id number]     

              

SNOMED-CT                    ()                   2021                   

completed                               [address] [place], 534983827  [id number]                                            

       

                                                        

 

                    Est. Patient - E&M Expanded                   [id number]     

              

SNOMED-CT                    ()                   2021                   

completed                               [address], [id number]  [id number]                                            

       

                                                        

 

                    Individual Psychotherapy                   [id number]         

          SNOMED-CT 

                     ()                   2018                   completed

   

                                        [address], 

[id number]  [id number]                                                           

  

             

 

                    Individual Psychotherapy                   [id number]         

          SNOMED-CT 

                     ()                   2020                   completed

   

                                        [address], 

[id number]  [id number]                                                           

  

             

 

                    Individual Psychotherapy                   [id number]         

          SNOMED-CT 

                     ()                   2021                   completed

   

                                        [address], 

[id number]  [id number]                                                           

  

             

 

                    Individual Psychotherapy                   [id number]         

          SNOMED-CT 

                     ()                   2021-02-10                   completed

   

                                        [address], 

[id number]  [id number]                                                           

  

             

 

                    Individual Psychotherapy                   [id number]         

          SNOMED-CT 

                     ()                   2017                   completed

   

                                        [address], 

[id number]  [id number]                                                           

  

             

 

                    Individual Psychotherapy                   [id number]         

          SNOMED-CT 

                     ()                   2017                   completed

   

                                        [address], 

[id number]  [id number]                                                           

  

             

 

                    Individual Psychotherapy                   [id number]         

          SNOMED-CT 

                     ()                   2017                   completed

   

                                        [address], 

[id number]  [id number]                                                           

  

             

 

                    Individual Psychotherapy                   [id number]         

          SNOMED-CT 

                     ()                   2017-10-16                   completed

   

                                        [address], 

593630174  [id number]                                                           

  

             

 

                    Individual Psychotherapy                   [id number]         

          SNOMED-CT 

                     ()                   2017-10-31                   completed

   

                                        [address], 

[id number]  [id number]                                                           

  

             

 

                    Individual Psychotherapy                   [id number]         

          SNOMED-CT 

                     ()                   2017                   completed

   

                                        [address], 

[id number]  [id number]                                                           

  

             

 

                    Individual Psychotherapy                   [id number]         

          SNOMED-CT 

                     ()                   2021                   completed

   

                                        [address], 

[id number]  [id number]                                                           

  

             

 

                    Individual Psychotherapy                   [id number]         

          SNOMED-CT 

                     ()                   2021                   completed

   

                                        [address], 

[id number]  [id number]                                                           

  

             

 

                    Individual Psychotherapy                   [id number]         

          SNOMED-CT 

                     ()                   2021                   completed

   

                                        [address], 

[id number]  [id number]                                                           

  

             

 

                    Individual Psychotherapy                   [id number]         

          SNOMED-CT 

                     ()                   2021                   completed

   

                                        [address], 

[id number]  [id number]                                                           

  

             

 

                    Individual Psychotherapy                   [id number]         

          SNOMED-CT 

                     ()                   2021                   completed

   

                                        [address], 

[id number]  [id number]                                                           

  

             

 

                    Individual Psychotherapy                   [id number]         

          SNOMED-CT 

                     ()                   2021                   completed

   

                                        [address], 

[id number]  [id number]                                                           

  

             

 

                    Individual Psychotherapy                   [id number]         

          SNOMED-CT 

                     ()                   2021                   completed

   

                                        [address], 

[id number]  [id number]                                                           

  

             

 

                    Individual Psychotherapy                   [id number]         

          SNOMED-CT 

                     ()                   2021                   completed

   

                                        [address], 

[id number]  [id number]                                                           

  

             

 

                    Individual Psychotherapy                   [id number]         

          SNOMED-CT 

                     ()                   2021                   completed

   

                                        [address], 

[id number]  [id number]                                                           

  

             

 

                    Individual Psychotherapy                   [id number]         

          SNOMED-CT 

                     ()                   2021                   completed

   

                                        [address], 

[id number]  [id number]                                                           

  

             

 

                    Individual Psychotherapy                   [id number]         

          SNOMED-CT 

                     ()                   2021                   completed

   

                                        [address], 

[id number]  [id number]                                                           

  

             

 

                    Individual Psychotherapy                   [id number]         

          SNOMED-CT 

                     ()                   2021                   completed

   

                                        [address], 

[id number]  [id number]                                                           

  

             

 

                    Individual Psychotherapy                   [id number]         

          SNOMED-CT 

                     ()                   2020-10-08                   completed

   

                                        [address], 

[id number]  [id number]                                                           

  

             

 

                    Individual Psychotherapy                   [id number]         

          SNOMED-CT 

                     ()                   2020-10-29                   completed

   

                                        [address], 

[id number]  [id number]                                                           

  

             

 

                    Individual Psychotherapy                   [id number]         

          SNOMED-CT 

                     ()                   2020                   completed

   

                                        [address], 

[id number]  [id number]                                                           

  

             

 

                    Individual Psychotherapy                   [id number]         

          SNOMED-CT 

                     ()                   2020                   completed

   

                                        [address], 

[id number]  [id number]                                                           

  

             

 

                    Individual Psychotherapy                   [id number]         

          SNOMED-CT 

                     ()                   2020                   completed

   

                                        [address], 

[id number]  [id number]                                                           

  

             

 

                    Individual Psychotherapy                   [id number]         

          SNOMED-CT 

                     ()                   2020                   completed

   

                                        [address], 

[id number]  [id number]                                                           

  

             

 

                    Individual Psychotherapy                   [id number]         

          SNOMED-CT 

                     ()                   2020                   completed

   

                                        [address], 

[id number]  [id number]                                                           

  

             

 

                    Individual Psychotherapy                   [id number]         

          SNOMED-CT 

                     ()                   2020                   completed

   

                                        [address], 

[id number]  [id number]                                                           

  

             

 

                    Individual Psychotherapy                   [id number]         

          SNOMED-CT 

                     ()                   2020                   completed

   

                                        [address], 

[id number]  [id number]                                                           

  

             

 

                    Individual Psychotherapy                   [id number]         

          SNOMED-CT 

                     ()                   2020                   completed

   

                                        [address], 

[id number]  [id number]                                                           

  

             

 

                    Individual Psychotherapy                   [id number]         

          SNOMED-CT 

                     ()                   2020                   completed

   

                                        [address], 

[id number]  [id number]                                                           

  

             

 

                    Individual Psychotherapy                   [id number]         

          SNOMED-CT 

                     ()                   2020                   completed

   

                                        [address], 

[id number]  [id number]                                                           

  

             

 

                    Individual Psychotherapy                   [id number]         

          SNOMED-CT 

                     ()                   2019                   completed

   

                                        [address], 

[id number]  [id number]                                                           

  

             

 

                    Individual Psychotherapy                   [id number]         

          SNOMED-CT 

                     ()                   2019-10-22                   completed

   

                                        [address], 

[id number]  [id number]                                                           

  

             

 

                    Individual Psychotherapy                   [id number]         

          SNOMED-CT 

                     ()                   2019                   completed

   

                                        [address], 

[id number]  [id number]                                                           

  

             

 

                    Individual Psychotherapy                   [id number]         

          SNOMED-CT 

                     ()                   2020                   completed

   

                                        [address], 

[id number]  [id number]                                                           

  

             

 

                    Individual Psychotherapy                   [id number]         

          SNOMED-CT 

                     ()                   2020                   completed

   

                                        [address], 

[id number]  [id number]                                                           

  

             

 

                    Individual Psychotherapy                   [id number]         

          SNOMED-CT 

                     ()                   2020                   completed

   

                                        [address], 

[id number]  [id number]                                                           

  

             

 

                    Individual Psychotherapy                   [id number]         

          SNOMED-CT 

                     ()                   2019                   completed

   

                                        [address],

 [id number]  [id number]   

                                                                            

 

                    Individual Psychotherapy                   [id number]         

          SNOMED-CT 

                     ()                   2019-06-10                   completed

   

                                        [address], 

[id number]  [id number]                                                           

  

             

 

                    Individual Psychotherapy                   [id number]         

          SNOMED-CT 

                     ()                   2019                   completed

   

                                        [address], 

[id number]  [id number]                                                           

  

             

 

                    Individual Psychotherapy                   [id number]         

          SNOMED-CT 

                     ()                   2019                   completed

   

                                        [address], 

[id number]  [id number]                                                           

  

             

 

                    Individual Psychotherapy                   [id number]         

          SNOMED-CT 

                     ()                   2019                   completed

   

                                        [address], 

[id number]  [id number]                                                           

  

             

 

                    Individual Psychotherapy                   [id number]         

          SNOMED-CT 

                     ()                   2019                   completed

   

                                        [address], 

[id number]  [id number]                                                           

  

             

 

                    Individual Psychotherapy                   [id number]         

          SNOMED-CT 

                     ()                   2019                   completed

   

                                        [address], 

[id number]  [id number]                                                           

  

             

 

                    Individual Psychotherapy                   [id number]         

          SNOMED-CT 

                     ()                   2019                   completed

   

                                        [address], 

[id number]  [id number]                                                           

  

             

 

                    Individual Psychotherapy                   [id number]         

          SNOMED-CT 

                     ()                   2019                   completed

   

                                        [address], 

[id number]  [id number]                                                           

  

             

 

                    Individual Psychotherapy                   [id number]         

          SNOMED-CT 

                     ()                   2019-03-15                   completed

   

                                        [address], 

[id number]  [id number]                                                           

  

             

 

                    Individual Psychotherapy                   [id number]         

          SNOMED-CT 

                     ()                   2019-04-15                   completed

   

                                        [facility], 

[id number]  [id number]                                                           

  

             

 

                    Individual Psychotherapy                   [id number]         

          SNOMED-CT 

                     ()                   2019                   completed

   

                                        [address], 

[id number]  [id number]                                                           

  

             

 

                    Individual Psychotherapy                   [id number]         

          SNOMED-CT 

                     ()                   2018                   completed

   

                                        [address], 

[id number]  [id number]                                                           

  

             

 

                    Individual Psychotherapy                   [id number]         

          SNOMED-CT 

                     ()                   2018                   completed

   

                                        [address], 

[id number]  [id number]                                                           

  

             

 

                    Individual Psychotherapy                   [id number]         

          SNOMED-CT 

                     ()                   2018-10-16                   completed

   

                                        [address], 

[id number]  [id number]                                                           

  

             

 

                    Individual Psychotherapy                   [id number]         

          SNOMED-CT 

                     ()                   2018                   completed

   

                                        [address], 

[id number]  [id number]                                                           

  

             

 

                    Individual Psychotherapy                   [id number]         

          SNOMED-CT 

                     ()                   2018-12-10                   completed

   

                                        [address], 

[id number]  [id number]                                                           

  

             

 

                    Individual Psychotherapy                   [id number]         

          SNOMED-CT 

                     ()                   2018                   completed

   

                                        [address], 

[id number]  [id number]                                                           

  

             

 

                    Individual Psychotherapy                   [id number]         

          SNOMED-CT 

                     ()                   2018                   completed

   

                                        [address], 

[id number]  [id number]                                                           

  

             

 

                    Individual Psychotherapy                   [id number]         

          SNOMED-CT 

                     ()                   2018                   completed

   

                                        [address], 

[id number]  [id number]                                                           

  

             

 

                    Individual Psychotherapy                   [id number]         

          SNOMED-CT 

                     ()                   2018                   completed

   

                                        [address], 

[id number]  [id number]                                                           

  

             

 

                    Individual Psychotherapy                   [id number]         

          SNOMED-CT 

                     ()                   2018                   completed

   

                                        [address], 

[id number]  [id number]                                                           

  

             

 

                    Individual Psychotherapy                   [id number]         

          SNOMED-CT 

                     ()                   2018                   completed

   

                                        [address], 

[id number]  [id number]                                                           

  

             

 

                    Individual Psychotherapy                   [id number]         

          SNOMED-CT 

                     ()                   2018                   completed

   

                                        [address], 

[id number]  [id number]                                                           

  

             

 

                    Individual Psychotherapy                   [id number]         

          SNOMED-CT 

                     ()                   2017                   completed

   

                                        [address], 

[id number]  [id number]                                                           

  

             

 

                    Individual Psychotherapy                   [id number]         

          SNOMED-CT 

                     ()                   2017                   completed

   

                                        [address], 

[id number]  [id number]                                                           

  

             

 

                    Individual Psychotherapy                   [id number]         

          SNOMED-CT 

                     ()                   2018                   completed

   

                                        [address], 

[id number]  [id number]                                                           

  

             

 

                    Individual Psychotherapy                   [id number]         

          SNOMED-CT 

                     ()                   2018                   completed

   

                                        [address], 

[id number]  [id number]                                                           

  

             

 

                    Individual Psychotherapy                   [id number]         

          SNOMED-CT 

                     ()                   2018                   completed

   

                                        [address], 

[id number]  [id number]                                                           

  

             

 

                    Individual Psychotherapy                   [id number]         

          SNOMED-CT 

                     ()                   2018                   completed

   

                                        [address], 

[id number]  [id number]                                                           

  

             

 

                          Psychiatric Diagnostic Evaluation without medical serv

ices                   

[id number]                   SNOMED-CT                    ()                   

2017                   completed                   [address], [id number]  [id number]                   

                                                                    

 

                                                      SNOMED-CT                 

   ()           

                    2017                   completed                   [facility]

[address], [id number]  [id number]           
                                                                            

 

                                                      SNOMED-CT                 

   ()           

                    2017                   completed                   [facility]

[address], [id number]  [id number]           
                                                                            

 

                                                      SNOMED-CT                 

   ()           

                    2017                   completed                   [facility]

C [address], [id number]  [id number]           
                                                                            

 

                                                      SNOMED-CT                 

   ()           

                    2017                   completed                   [facility]

[address], [id number]  [id number]           
                                                                            

 

                                                      SNOMED-CT                 

   ()           

                    2017                   completed                   [facility]

C [address], [id number]  [id number]           
                                                                            

 

                                                      SNOMED-CT                 

   ()           

                    2017-10-16                   completed                   [facility]

[address], [id number]  [id number]           
                                                                            

 

                                                      SNOMED-CT                 

   ()           

                    2017                   completed                   [address], [id number]  [phone]           
                                                                            

 

                                                      SNOMED-CT                 

   ()           

                    2017                   completed                   [facility]

C [address], [id number]  [phone]           
                                                                            

 

                                                      SNOMED-CT                 

   ()           

                    2017                   completed                   [facility]

C [address], [id number]  [phone]           
                                                                            

 

                                                      SNOMED-CT                 

   ()           

                    2017                   completed                   [address], [id number]  [phone]           
                                                                            

 

                                                      SNOMED-CT                 

   ()           

                    2017-10-31                   completed                   [facility]

C [address], [id number]  [phone]           
                                                                            

 

                                                      SNOMED-CT                 

   ()           

                    2018                   completed                   [facility]

C [address], [id number]  [phone]           
                                                                            

 

                                                      SNOMED-CT                 

   ()           

                    2018                   completed                   [facility]

C [address], [id number]  [phone]           
                                                                            

 

                                                      SNOMED-CT                 

   ()           

                    2018                   completed                   [facility]

[address], [id number]  [phone]           
                                                                            



                                                                                
                                                                                
                                                                                
                                                                                
                                                                                
                                                                                
                                                                                
                                                                                
                                                                                
                                                                                
                                                                                
                                                                                
                                                                                
                                                                                
                                                                                
                                                                                
                                                                                
                                                                                
                                                                                
                                                                                
                                                                                
                                                                                
                                                                                
                                                                                
          



Encounters/Encounter Diagnoses

                      



                    Encounter Name                   Encounter Code             

      Diagnosis Code

                          Diagnosis Name                   Diagnosis CodeSystem 

        

                          Date of Diagnosis                   Service Delivery L

ocation          

     

 

                          Telehealth Physchotherapy 30 Minutes with Patient     

              [address]       

                          [id number]                   Recurrent depressive disor

dennise, current 

episode moderate                   SNOMED-CT                   2021       

                                        [facility]  [address] [place] [address], 

[phone]                  



                                                                                
                                 



Vital Signs

                      



                Code                   CodeSystem                   Vitals      

             

Date                                    Value                

 

                    8462-4                   [facility]                   Blood Press

ure-Diastolic       

                          2020                   130 mm[HG]               

 

 

                52214-7                   [facility]                   Weight        

           

2020                              234 [lb_av]                

 

                27300-6                   [facility]                   BMI           

        

2020                              41.45 (lb/in2)                

 

                    8480-6                   [facility]                   Blood Press

ure-Systolic        

                          2020                   90 mm[HG]                

 

                8867-4                   [facility]                   Heart Rate     

              

2020                              101 /min                

 

                8302-2                   LOINC                   Height         

          

2020                              63 [in_i]                



                                                                                
              



Social History

                      



                    Element Description                   Description           

        Start Date  

                    End Date                   Code                   CodeSystem

   

                                        AdditionalInfo                

 

                    SexAssignedAtBirth                   Female                 

  1970        

                                        F                   AdministrativeGender

          

                                                        



                                                                                
    



Hospital Discharge Instructions

                                    * 



                                                                                
                                    



Reason For Referral

                      





                                                                                
    



Medical Equipment

                                    *     FDA



                                                                                
                



Assessments

                                    * 



                                                                                
      



Goals Section

                      



                          Goals                     Planned DateTime            

    

 

                                        Kath will report that her level of dep

ression(irritability) is a 3 or less on 

a scale of 1 low and 10 high most days during the treatment period(90 days).    
                                        2017

## 2021-10-16 NOTE — CCD
Continuity of Care Document (CCD)

                             Created on: 10/14/2021



Kath Ken

External Reference #: [id number]

: 1970

Sex: Female



Demographics





                          Address                   [address] [place] [address]  [address]

 

                          Home Phone                [phone]

 

                          Preferred Language        Unknown

 

                          Marital Status            Unknown

 

                          Sabianist Affiliation     Unknown

 

                          Race                      White

 

                          Ethnic Group              Not  or 





Author





                          Author                    Kath JAMISON M.D.

 

                          Organization              Unknown

 

                          Address                   [address] [place] [address]  [address]



 

                          Phone                     [phone]







Care Team Providers





                    Care Team Member Name Role                Phone

 

                    Yamil Vences M.D. AUTM                [phone]







Problems





                    Active Problems     Provider            Date

 

                    Ischemic stroke     Colleen Jamison M.D. Onset: 2017







Social History





                Type            Date            Description     Comments

 

                Birth Sex                       Unknown          

 

                Tobacco Use     Start: Unknown End: Unknown Patient is a former 

smoker  







Allergies and adverse reactions





             Active Allergies Criticality  Reaction | Severity Comments     Date

 

             Pioglitazone Unable to assess criticality              pedal edema 

 2019

 

                                        Inactive Allergies

 

             NKDA         Unable to assess criticality                          

 2017







Medications





           Active Medications SIG        Qnty       Indications Ordering Provide

r Date

 

                          Diazepam                     5mg Tablets              

     take 1 tab 30-60 

minutes prior to mri. 1uli Jamison M.D. 2019

 

                          Baclofen                     10mg Tablets             

      Take One Tablet By 

Mouth @7Am and Take One Tablet By Mouth @1PM and Take One Tablet By Mouth @7PM 

90uli Jamison M.D. 2017

 

                          Nortriptyline HCL                     25mg Capsules   

                Take One 

Capsule By Mouth @11PM 30sanjiv Jamison M.D. 

 

                          Clopidogrel Bisulfate                     75mg Tablets

                   Take 

One Tablet By Mouth Once Daily 30uli Jamison M.D. 2017

 

                          Atorvastatin Calcium                     80mg Tablets 

                  Take One

Tablet By Mouth AT Bedtime 30uli Jamison M.D.

 2017







Immunizations





                                        Description

 

                                        No Information Available







Vital Signs





                Date            Vital           Result          Comment

 

                2021  6:16am BP Systolic     130 mmHg         

 

                    BP Diastolic        80 mmHg              

 

                    Heart Rate          80 /min              

 

                    Respiratory Rate    20 /min              

 

                2021  8:50am BP Systolic     110 mmHg         

 

                    BP Diastolic        80 mmHg              

 

                    Heart Rate          76 /min              

 

                    Respiratory Rate    16 /min              







Results





                                        Description

 

                                        No Information Available







Procedures





                Date            Code            Description     Status

 

                10/11/2021      91163           Injection For Nerve Block, Other

 Peripheral Nerve Or Branch 

Completed

 

                10/11/2021      03932           Inj, Anesth Agent, Trigeminal Co

mpleted

 

                    10/11/2021          06121               Injection Single Or 

Multiple Trigger Points Three Or More 

Muscles                                 Completed

 

                2021      58985           Office/Outpatient Established Mo

d MDM 30-39 Min Completed

 

                2021      06978           Injection For Nerve Block, Other

 Peripheral Nerve Or Branch 

Completed

 

                2021      76740           Inj, Anesth Agent, Trigeminal Co

mpleted

 

                    2021          94088               Injection Single Or 

Multiple Trigger Points Three Or More 

Muscles                                 Completed

 

                2021      27886           Office/Outpatient Established Mo

d MDM 30-39 Min Completed

 

                2021      24373           Injection For Nerve Block, Other

 Peripheral Nerve Or Branch 

Completed

 

                2021      28955           Inj, Anesth Agent, Trigeminal Co

mpleted

 

                    2021          93542               Injection Single Or 

Multiple Trigger Points Three Or More 

Muscles                                 Completed







Medical Devices





                                        Description

 

                                        No Information Available







Encounters





           Type       Date       Location   Provider   Dx         Diagnosis

 

             Office Visit 2021 11:15a Main office - [facility] [employer] JHONNY Hernandez.A.-C. 

G43.719                                 Chronic migraine w/o aura, intractable, 

w/o stat migr

 

                          M54.81                    Occipital neuralgia

 

                          G44.82                    Headache associated with sex

ual activity

 

                          I63.09                    Cerebral infarction due to t

hrombosis of precerebral artery

 

                          M54.2                     Cervicalgia

 

                          G56.02                    Carpal tunnel syndrome, left

 upper limb

 

                          F45.8                     Other somatoform disorders

 

             Office Visit 2021  1:45p Main office - [employer] [facility] [employer] JHONNY Hernandez.A.-C. 

M54.81                                  Occipital neuralgia

 

                          G43.719                   Chronic migraine w/o aura, i

ntractable, w/o stat migr

 

                          G44.82                    Headache associated with sex

ual activity

 

                          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

ilateral temporomandibular joint

 

                          I63.09                    Cerebral infarction due to t

hrombosis of precerebral artery

 

                          M54.2                     Cervicalgia

 

                          G56.02                    Carpal tunnel syndrome, left

 upper limb







Assessments





                Date            Code            Description     Provider

 

                10/11/2021      M54.81          Occipital neuralgia Colleen francois M.D.

 

                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

 

                10/11/2021      G50.0           Trigeminal neuralgia Peñapenelope Suraj arriaza M.D.

 

                2021      G43.719         Chronic migraine without aura, i

ntractable, without status m 

JHONNY Ferrara.A.-C.

 

                2021      M54.81          Occipital neuralgia JHONNY Hernandez.A.-C.

 

                2021      G44.82          Headache associated with sexual 

activity JHONNY Ferrara.A.-C.

 

                    2021          I63.09              Cerebral infarction 

due to thrombosis of other precerebral 

artery                                  RAYMOND FerraraA.-CChelle

 

                2021      M54.2           Cervicalgia     JHONNY Ferrara.A.-C.

 

                2021      G56.02          Carpal tunnel syndrome, left upp

er limb JHONNY Ferrara.A.-CChelle

 

                2021      F45.8           Other somatoform disorders RAYMOND FerraraA.-C.

 

                2021      G50.0           Trigeminal neuralgia Colleen arriaza M.D.

 

                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

 

                2021      M54.81          Occipital neuralgia Colleen francois M.D.

 

                2021      M54.81          Occipital neuralgia JHONNY Hernandez.A.-CChelle

 

                2021      G43.719         Chronic migraine without aura, i

ntractable, without status m 

RAYMOND FerraraA.-C.

 

                2021      G44.82          Headache associated with sexual 

activity RAYMOND FerraraA.-CChelle

 

                2021      M26.633         Articular disc disorder of bilat

eral temporomandibular joint 

JHONNY Ferrara.A.-C.

 

                    2021          I63.09              Cerebral infarction 

due to thrombosis of other precerebral 

artery                                  JHONNY Ferrara.A.-C.

 

                2021      M54.2           Cervicalgia     RAYMOND FerraraA.-C.

 

                2021      G56.02          Carpal tunnel syndrome, left upp

er limb JHONNY Ferrara.A.-C.

 

                2021      M54.81          Occipital neuralgia Colleen francois M.D.

 

                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

 

                2021      G50.0           Trigeminal neuralgia Colleen arriaza M.D.







Plan of Treatment

Future Appointment(s):* 11/15/2021  3:35 pm - Colleen Jamison M.D. at Main 
  office [facility]

* 2021 10:30 am - RAYMOND FerraraA.-C. at [facility] office [facility]

2021 - JESSICA Ferrara-C.* G43.719 Chronic migraine without aura, 
  intractable, without status m* Comments:* Improved.





* M54.81 Occipital neuralgia* Comments:* Continue nerve blocks.





* G44.82 Headache associated with sexual activity* Comments:* Not recurrent.





* I63.09 Cerebral infarction due to thrombosis of other precerebral artery* 
  Comments:* She continues Plavix and statin.





* M54.2 Cervicalgia* Comments:* Controlled with ROM exercises and stretching.





* G56.02 Carpal tunnel syndrome, left upper limb* Comments:* Follow up with Dr Daily.





* F45.8 Other somatoform disorders* Comments:* She will monitor for teeth 
  clenching.



* Follow up:* 3 months









Functional Status





                                        Description

 

                                        No Information Available







Mental Status





                                        Description

 

                                        No Information Available







Referrals





                                        Description

 

                                        No Information Available

## 2021-10-16 NOTE — CCD
Continuity of Care Document (CCD)

                             Created on: 2021



Kath Ken

External Reference #: [id number]

: 1970

Sex: Female



Demographics





                          Address                   [address] [place] [address]  [address]

 

                          Home Phone                [phone]

 

                          Preferred Language        Unknown

 

                          Marital Status            Unknown

 

                          Anglican Affiliation     Unknown

 

                          Race                      White

 

                          Ethnic Group              Not  or 





Author





                          Author                    Kath JAMISON M.D.

 

                          Organization              Unknown

 

                          Address                   [address] [place] [address]  [address]



 

                          Phone                     [phone]







Care Team Providers





                    Care Team Member Name Role                Phone

 

                    Yamil Vences M.D. AUTM                [phone]







Problems





                    Active Problems     Provider            Date

 

                    Ischemic stroke     Colleen Jamison M.D. Onset: 2017







Social History





                Type            Date            Description     Comments

 

                Birth Sex                       Unknown          

 

                Tobacco Use     Start: Unknown End: Unknown Patient is a former 

smoker  







Allergies, Adverse Reactions, Alerts





             Active Allergies Reaction     Severity     Comments     Date

 

             Pioglitazone                           pedal edema  2019

 

                                        Inactive Allergies

 

             NKDA                                                2017







Medications





           Active Medications SIG        Qnty       Indications Ordering Provide

r Date

 

                          Diazepam                     5mg Tablets              

     take 1 tab 30-60 

minutes prior to mri. 1taantony Jamison M.D. 2019

 

                          Baclofen                     10mg Tablets             

      Take One Tablet By 

Mouth Three Times Daily For muscle Spasm Max Daily Dose Three Tablets 90taantony Jamison M.D.                   2017

 

                          Nortriptyline HCL                     25mg Capsules   

                Take One 

Capsule By Mouth AT Bedtime Once Daily Max Daily Dose One Capsule 30caps        

                          Colleen Jamison M.D.                         2017

 

                          Clopidogrel Bisulfate                     75mg Tablets

                   Take 

One Tablet By Mouth Once Daily 30uli Jamison M.D. 2017

 

                          Atorvastatin Calcium                     80mg Tablets 

                  Take One

Tablet By Mouth AT Bedtime 30taantony Jamison M.D.

 2017







Immunizations





                                        Description

 

                                        No Information Available







Vital Signs





                Date            Vital           Result          Comment

 

                2021  8:50am BP Systolic     110 mmHg         

 

                    BP Diastolic        80 mmHg              

 

                    Heart Rate          76 /min              

 

                    Respiratory Rate    16 /min              

 

                2019  7:16am BP Systolic     118 mmHg         

 

                    BP Diastolic        80 mmHg              

 

                    Heart Rate          76 /min              

 

                    Respiratory Rate    16 /min              







Results





                                        Description

 

                                        No Information Available







Procedures





                Date            Code            Description     Status

 

                2021      70381           Office/Outpatient Established Mo

d MDM 30-39 Min Completed

 

                2021      36653           Injection For Nerve Block, Other

 Peripheral Nerve Or Branch 

Completed

 

                2021      09045           Inj, Anesth Agent, Trigeminal Co

mpleted

 

                    2021          11680               Injection Single Or 

Multiple Trigger Points Three Or More 

Muscles                                 Completed

 

                2021      92540           Injection For Nerve Block, Other

 Peripheral Nerve Or Branch 

Completed

 

                2021      14937           Inj, Anesth Agent, Trigeminal Co

mpleted

 

                    2021          17940               Injection Single Or 

Multiple Trigger Points Three Or More 

Muscles                                 Completed

 

                2021      97131           Office/Outpatient Established Lo

w MDM 20-29 Min Completed

 

                2021      87441           Injection For Nerve Block, Other

 Peripheral Nerve Or Branch 

Completed

 

                2021      40943           Inj, Anesth Agent, Trigeminal Co

mpleted

 

                    2021          54839               Injection Single Or 

Multiple Trigger Points Three Or More 

Muscles                                 Completed

 

                2021      03002           Injection For Nerve Block, Other

 Peripheral Nerve Or Branch 

Completed

 

                2021      03550           Inj, Anesth Agent, Trigeminal Co

mpleted

 

                    2021          23510               Injection Single Or 

Multiple Trigger Points Three Or More 

Muscles                                 Completed







Medical Devices





                                        Description

 

                                        No Information Available







Encounters





           Type       Date       Location   Provider   Dx         Diagnosis

 

             Office Visit 2021  1:45p Main office - [facility], P.A.-[facility] 

M54.81                                  Occipital neuralgia

 

                          G43.719                   Chronic migraine w/o aura, i

ntractable, w/o stat migr

 

                          G44.82                    Headache associated with sex

ual activity

 

                          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

ilateral temporomandibular joint

 

                          I63.09                    Cerebral infarction due to t

hrombosis of precerebral artery

 

                          M54.2                     Cervicalgia

 

                          G56.02                    Carpal tunnel syndrome, left

 upper limb

 

             Office Visit 2021 11:15a Main office - [facility], P[facility] 

G43.719                                 Chronic migraine w/o aura, intractable, 

w/o stat migr

 

                          M54.81                    Occipital neuralgia

 

                          G44.82                    Headache associated with sex

ual activity

 

                          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

ilateral temporomandibular joint

 

                          I63.09                    Cerebral infarction due to t

hrombosis of precerebral artery

 

                          M54.2                     Cervicalgia

 

                          G56.02                    Carpal tunnel syndrome, left

 upper limb







Assessments





                Date            Code            Description     Provider

 

                2021      M54.81          Occipital neuralgia JHONNY Hernandez.A.-C.

 

                2021      G43.719         Chronic migraine without aura, i

ntractable, without status m 

[facility] P.A.-[facility]

 

                2021      G44.82          Headache associated with sexual 

activity [facility] 

P.A.-C.

 

                2021      M26.633         Articular disc disorder of bilat

eral temporomandibular joint 

Chel[facility] Archer P.A.-C.

 

                    2021          I63.09              Cerebral infarction 

due to thrombosis of other precerebral 

artery                                  Chel[facility] Archer P.A.-C.

 

                2021      M54.2           Cervicalgia     Chelsea Archer

 P.A.-C.

 

                2021      G56.02          Carpal tunnel syndrome, left upp

er limb JHONNY Ferrara.A.-C.

 

                2021      M54.81          Occipital neuralgia Colleen francois M.D.

 

                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

 

                2021      G50.0           Trigeminal neuralgia Colleen arriaza M.D.

 

                2021      M54.81          Occipital neuralgia Colleen francois M.D.

 

                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

 

                2021      G50.0           Trigeminal neuralgia Colleen arriaza M.D.

 

                2021      G43.719         Chronic migraine without aura, i

ntractable, without status m 

JHONNY Ferrara.A.-C.

 

                2021      M54.81          Occipital neuralgia JHONNY Hernandez.A.-C.

 

                2021      G44.82          Headache associated with sexual 

activity RAYMOND FerraraAChelle-C.

 

                2021      M26.633         Articular disc disorder of bilat

eral temporomandibular joint 

RAYMOND FerraraA.-C.

 

                    2021          I63.09              Cerebral infarction 

due to thrombosis of other precerebral 

artery                                  RAYMOND FerraraAChelle-C.

 

                2021      M54.2           Cervicalgia     RAYMOND FerraraA.-CChelle

 

                2021      G56.02          Carpal tunnel syndrome, left upp

er limb RAYMOND FerraraAChelle-CChelle

 

                2021      M54.81          Occipital neuralgia Colleen francois M.D.

 

                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

 

                2021      G50.0           Trigeminal neuralgia Colleen arriaza M.D.

 

                2021      M54.81          Occipital neuralgia Colleen francois M.D.

 

                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

 

                2021      G50.0           Trigeminal neuralgia Colleen arriaza M.D.







Plan of Treatment

Future Appointment(s):* 2021 11:15 am - Chelsea Archer P.A.-C. at Main 
  office [facility]

2021 - [facility] P.A.-C.* M54.81 Occipital neuralgia* Comments:* 
  Controlled with occipital nerve blocks.





* G43.719 Chronic migraine without aura, intractable, without status m* 
  Comments:* Controlled. Continue current medications.





* G44.82 Headache associated with sexual activity* Comments:* Not recurrent.





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 
  Comments:* Stable.





* I63.09 Cerebral infarction due to thrombosis of other precerebral artery* 
  Comments:* Not recurrent.  She continues Plavix and statin.





* M54.2 Cervicalgia* Comments:* Controlled.





* G56.02 Carpal tunnel syndrome, left upper limb* Comments:* Follow up with Dr Daily.



* Follow up:* 3 months









Functional Status





                                        Description

 

                                        No Information Available







Mental Status





                                        Description

 

                                        No Information Available







Referrals





                Refer to      Reason for Referral Status          Appt Date

 

                Colleen Jamison M.D.                 Created         

0

 

                                        [address] [place] [address] [phone]

 

                                          

 

                Colleen Jamison M.D.                 Created         

0

 

                                        [address] [place] [address] [phone]

## 2021-10-16 NOTE — REPVR
PROCEDURE INFORMATION: 

Exam: XR Left Ankle 

Exam date and time: 10/16/2021 2:23 AM 

Age: 51 years old 

Clinical indication: Other: Fall 



TECHNIQUE: 

Imaging protocol: XR Left ankle. 

Views: 1 or 2 views. 



COMPARISON: 

No relevant prior studies available. 



FINDINGS: 

Bones/joints: Single view of the left ankle demonstrates a trimalleolar 

fracture/dislocation with lateral dislocation of the talus in respect to the 

distal tibia and fibula. Displaced and angulated fracture involving the distal 

fibular metadiaphysis. Displaced fractures involving the medial and posterior 

malleoli of the distal tibia. 

Soft tissues: Soft tissue swelling. 



IMPRESSION: 

Trimalleolar fracture/dislocation of the left ankle. 



Electronically signed by: Isrrael Stevenson On 10/16/2021  04:56:01 AM

## 2021-10-18 NOTE — ED PDOC
Post-Departure Follow-Up


knee, ankle, tib fib xrays x 2 faxed to dr bladimir gavin for fu mlg Lundborg-Gray,Maja MD          Oct 18, 2021 14:19

## 2021-10-22 ENCOUNTER — HOSPITAL ENCOUNTER (OUTPATIENT)
Dept: HOSPITAL 53 - M SOG | Age: 51
End: 2021-10-22
Attending: ORTHOPAEDIC SURGERY
Payer: MEDICARE
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
## 2021-10-22 DIAGNOSIS — M79.89: ICD-10-CM
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
## 2021-10-22 DIAGNOSIS — M77.32: ICD-10-CM
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
## 2021-10-22 DIAGNOSIS — S82.851A: Primary | ICD-10-CM
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
## 2021-10-22 NOTE — REP
INDICATION:

POST CAST XRAY.



COMPARISON:

Preprocedural exam 10/16/2021



TECHNIQUE:

Three views with cast on



FINDINGS:

Previously described fracture dislocation has been reduced.  Overlying casting

material obscures the bony detail.  There is persistent widening of the medial

mortise.  The alignment of the fractures has significantly improved.  There are

plantar and retrocalcaneal heel spurs.



IMPRESSION:

Significant improvement.  Continued surveillance is suggested.





<Electronically signed by Brock Dewitt > 10/22/21 0240

## 2021-10-24 NOTE — REP
INDICATION:

LT ANKLE FX.



COMPARISON:

10/16/2021



TECHNIQUE:

AP, lateral, oblique views of the left ankle



FINDINGS:

Trimalleolar ankle fractures with overlying soft tissue swelling are appreciated.

There is improved alignment to the tibiotalar joint/ankle mortise as compared to prior

exam.



IMPRESSION:

Trimalleolar fracture dislocation with improved alignment at the ankle mortise as

compared to 10/16/2021.





<Electronically signed by Isrrael Smith > 10/24/21 [id number]